# Patient Record
Sex: MALE | Race: BLACK OR AFRICAN AMERICAN | NOT HISPANIC OR LATINO | Employment: OTHER | URBAN - METROPOLITAN AREA
[De-identification: names, ages, dates, MRNs, and addresses within clinical notes are randomized per-mention and may not be internally consistent; named-entity substitution may affect disease eponyms.]

---

## 2018-07-19 ENCOUNTER — OFFICE VISIT (OUTPATIENT)
Dept: PAIN MEDICINE | Facility: CLINIC | Age: 48
End: 2018-07-19
Payer: OTHER GOVERNMENT

## 2018-07-19 VITALS
BODY MASS INDEX: 26.66 KG/M2 | DIASTOLIC BLOOD PRESSURE: 83 MMHG | SYSTOLIC BLOOD PRESSURE: 120 MMHG | HEART RATE: 66 BPM | WEIGHT: 180 LBS | HEIGHT: 69 IN

## 2018-07-19 DIAGNOSIS — M50.30 DDD (DEGENERATIVE DISC DISEASE), CERVICAL: ICD-10-CM

## 2018-07-19 DIAGNOSIS — M54.16 LUMBAR RADICULITIS: ICD-10-CM

## 2018-07-19 DIAGNOSIS — M96.1 POSTLAMINECTOMY SYNDROME OF LUMBAR REGION: ICD-10-CM

## 2018-07-19 DIAGNOSIS — M51.36 DDD (DEGENERATIVE DISC DISEASE), LUMBAR: Primary | ICD-10-CM

## 2018-07-19 PROCEDURE — 99203 OFFICE O/P NEW LOW 30 MIN: CPT | Mod: PBBFAC,PN | Performed by: ANESTHESIOLOGY

## 2018-07-19 PROCEDURE — 99999 PR PBB SHADOW E&M-NEW PATIENT-LVL III: CPT | Mod: PBBFAC,,, | Performed by: ANESTHESIOLOGY

## 2018-07-19 PROCEDURE — 99204 OFFICE O/P NEW MOD 45 MIN: CPT | Mod: S$PBB,,, | Performed by: ANESTHESIOLOGY

## 2018-07-19 RX ORDER — MELOXICAM 15 MG/1
TABLET ORAL
COMMUNITY
Start: 2018-06-15

## 2018-07-19 RX ORDER — METHOCARBAMOL 500 MG/1
TABLET, FILM COATED ORAL
COMMUNITY
Start: 2018-05-17

## 2018-07-19 RX ORDER — TRAMADOL HYDROCHLORIDE 50 MG/1
50 TABLET ORAL EVERY 12 HOURS PRN
Qty: 30 TABLET | Refills: 2 | Status: SHIPPED | OUTPATIENT
Start: 2018-07-19 | End: 2019-01-08 | Stop reason: SDUPTHER

## 2018-07-19 RX ORDER — ACYCLOVIR 800 MG/1
TABLET ORAL
COMMUNITY
Start: 2018-06-15

## 2018-07-19 RX ORDER — METHOCARBAMOL 750 MG/1
TABLET, FILM COATED ORAL
COMMUNITY
Start: 2018-06-15 | End: 2018-07-19 | Stop reason: SDUPTHER

## 2018-07-19 RX ORDER — TOPIRAMATE 50 MG/1
TABLET, FILM COATED ORAL
COMMUNITY
Start: 2018-06-15

## 2018-07-19 RX ORDER — TRAMADOL HYDROCHLORIDE 50 MG/1
TABLET ORAL
COMMUNITY
Start: 2018-04-13 | End: 2018-07-19

## 2018-07-19 RX ORDER — AMLODIPINE BESYLATE 10 MG/1
TABLET ORAL
COMMUNITY
Start: 2018-06-15

## 2018-07-19 NOTE — PROGRESS NOTES
This note was completed with dictation software and grammatical errors may exist.    Referring Physician:     PCP: Sara Quan NP      CC:  Neck and lower back pain    HPI:   Marshall Barboza is a 48 y.o. male referred to us for neck and lower back pain.  Patient is a former active  personnel.  He has significant history of cervical and lumbar spine surgery.  He underwent L4-5 laminotomy and diskectomy in February 2015.  Radicular pain improved but he continues to have lower back pain.  He also was found to have cervical DDD.  He underwent a C4-C7 ACDF.  Lower back and right leg pain is more bothersome currently.  He has constant aching, throbbing pain in his lower back.  Pain radiates to down his right buttock into his right calf.  Pain occasionally travels down the left calf.  He has had lumbar MB RFA procedures in the past at the [facility] with short-lived relief.  He has undergone lumbar ESIwith moderate benefit for 3 months.  He currently takes tramadol very sparingly with mild-to-moderate benefit.  He denies any worsening weakness.  No bowel bladder changes.    ROS:  CONSTITUTIONAL: No fevers, chills, night sweats, wt. loss, appetite changes  SKIN: no rashes or itching  ENT: No headaches, head trauma, vision changes, or eye pain  LYMPH NODES: None noticed   CV: No chest pain, palpitations.   RESP: No shortness of breath, dyspnea on exertion, cough, wheezing, or hemoptysis  GI: No nausea, emesis, diarrhea, constipation, melena, hematochezia, pain.    : No dysuria, hematuria, urgency, or frequency   HEME: No easy bruising, bleeding problems  PSYCHIATRIC: No depression, anxiety, psychosis, hallucinations.  NEURO: No seizures, memory loss, dizziness or difficulty sleeping  MSK:  Positive per HPI    Past Medical History:   Diagnosis Date    Allergy     Arthritis     Depression     Hypertension      Past Surgical History:   Procedure Laterality Date    BREAST SURGERY      FRACTURE SURGERY       "HERNIA REPAIR      SPINE SURGERY       No family history on file.  Social History     Social History    Marital status:      Spouse name: N/A    Number of children: N/A    Years of education: N/A     Social History Main Topics    Smoking status: None    Smokeless tobacco: Never Used    Alcohol use 0.6 oz/week     1 Cans of beer per week    Drug use: No    Sexual activity: Yes     Other Topics Concern    None     Social History Narrative    None         Medications/Allergies: See med card    Vitals:    07/19/18 0925   BP: 120/83   Pulse: 66   Weight: 81.6 kg (180 lb)   Height: 5' 9" (1.753 m)   PainSc:   6   PainLoc: Back         Physical exam:    GENERAL: A and O x3, the patient appears well groomed and is in no acute distress.  Skin: No rashes or obvious lesions  HEENT: normocephalic, atraumatic  CARDIOVASCULAR:  Palpable peripheral pulses  LUNGS: easy work of breathing  ABDOMEN: soft, nontender   UPPER EXTREMITIES: Normal alignment, normal range of motion, no atrophy, no skin changes,  hair growth and nail growth normal and equal bilaterally. No swelling, no tenderness.    LOWER EXTREMITIES:  Normal alignment, normal range of motion, no atrophy, no skin changes,  hair growth and nail growth normal and equal bilaterally. No swelling, no tenderness.  CERVICAL SPINE:  Cervical spine: ROM is limited in flexion, extension and lateral rotation with moderate increased pain.  Spurling's maneuver causes no neck pain to either side.  Myofascial exam: No Tenderness to palpation across cervical paraspinous region bilaterally.    LUMBAR SPINE  Lumbar spine: ROM is full with flexion extension and oblique extension with moderate increased pain.    Darrel's test causes no increased pain on either side.    Supine straight leg raise is positive on the right at 45°  Internal and external rotation of the hip causes no increased pain on either side.  Myofascial exam: No tenderness to palpation across lumbar " paraspinous muscles.      MENTAL STATUS: normal orientation, speech, language, and fund of knowledge for social situation.  Emotional state appropriate.    CRANIAL NERVES:  II:  PERRL bilaterally,   III,IV,VI: EOMI.    V:  Facial sensation equal bilaterally  VII:  Facial motor function normal.  VIII:  Hearing equal to finger rub bilaterally  IX/X: Gag normal, palate symmetric  XI:  Shoulder shrug equal, head turn equal  XII:  Tongue midline without fasciculations      MOTOR: Tone and bulk: normal bilateral upper and lower Strength: normal   Delt Bi Tri WE WF     R 5 5 5 5 5 5   L 5 5 5 5 5 5     IP ADD ABD Quad TA Gas HAM  R 5 5 5 5 5 5 5  L 5 5 5 5 5 5 5    SENSATION: Light touch and pinprick intact bilaterally  REFLEXES: normal, symmetric, nonbrisk.  Toes down, no clonus. No hoffmans.  GAIT:  Uses cane for assistance       Imaging:  MRI lumbar spine 01/2016 (outside records)  Lumbar spondylosis with small posterior disc protrusion L4-5 and L5-S1 with moderate bilateral neuroforaminal narrowing at L4-5.  Has postoperative changes of a laminotomy at L4-5 noted.    MRI cervical spine 05/2015  Postoperative changes of total disc arthroplasty at C4-5 anterior fusion with interbody spaces at C5-6 and C6-7.    Assessment:  Patient referred for neck and lower back pain  1. DDD (degenerative disc disease), lumbar    2. Postlaminectomy syndrome of lumbar region    3. DDD (degenerative disc disease), cervical    4. Lumbar radiculitis          Plan:  1. I have stressed the importance of physical activity and exercise to improve overall health  2. I think that the patient's back pain and radicular leg symptoms are due to degenerative disc disease and have recommended a lumbar epidural steroid injection to the Bilateral L4-5 level(s).  3.  May consider updated lumbar imaging if above is not helpful  4.  May consider cervical DEMETRIA if neck pain does not improve as well  5.  He continue tramadol 50 mg q.day as needed.  He takes  this very sparingly.  6.  Follow-up at the procedure      Thank you for referring this interesting patient, and I look forward to continuing to collaborate in his care.

## 2018-07-20 DIAGNOSIS — M51.36 DDD (DEGENERATIVE DISC DISEASE), LUMBAR: Primary | ICD-10-CM

## 2018-08-02 ENCOUNTER — SURGERY (OUTPATIENT)
Age: 48
End: 2018-08-02

## 2018-08-02 ENCOUNTER — HOSPITAL ENCOUNTER (OUTPATIENT)
Facility: AMBULARY SURGERY CENTER | Age: 48
Discharge: HOME | End: 2018-08-02
Attending: ANESTHESIOLOGY | Admitting: ANESTHESIOLOGY
Payer: OTHER GOVERNMENT

## 2018-08-02 DIAGNOSIS — M54.16 LUMBAR RADICULITIS: Primary | ICD-10-CM

## 2018-08-02 PROCEDURE — 64483 NJX AA&/STRD TFRM EPI L/S 1: CPT | Mod: 50,,, | Performed by: ANESTHESIOLOGY

## 2018-08-02 PROCEDURE — 64483 NJX AA&/STRD TFRM EPI L/S 1: CPT | Mod: LT | Performed by: ANESTHESIOLOGY

## 2018-08-02 RX ORDER — DEXAMETHASONE SODIUM PHOSPHATE 10 MG/ML
INJECTION INTRAMUSCULAR; INTRAVENOUS
Status: DISCONTINUED
Start: 2018-08-02 | End: 2018-08-02 | Stop reason: HOSPADM

## 2018-08-02 RX ORDER — BUPIVACAINE HYDROCHLORIDE 2.5 MG/ML
INJECTION, SOLUTION EPIDURAL; INFILTRATION; INTRACAUDAL
Status: DISCONTINUED | OUTPATIENT
Start: 2018-08-02 | End: 2018-08-02 | Stop reason: HOSPADM

## 2018-08-02 RX ORDER — DEXAMETHASONE SODIUM PHOSPHATE 10 MG/ML
INJECTION INTRAMUSCULAR; INTRAVENOUS
Status: DISCONTINUED | OUTPATIENT
Start: 2018-08-02 | End: 2018-08-02 | Stop reason: HOSPADM

## 2018-08-02 RX ORDER — HYDROCHLOROTHIAZIDE 25 MG/1
25 TABLET ORAL DAILY
COMMUNITY

## 2018-08-02 RX ORDER — SODIUM CHLORIDE, SODIUM LACTATE, POTASSIUM CHLORIDE, CALCIUM CHLORIDE 600; 310; 30; 20 MG/100ML; MG/100ML; MG/100ML; MG/100ML
INJECTION, SOLUTION INTRAVENOUS ONCE AS NEEDED
Status: DISCONTINUED | OUTPATIENT
Start: 2018-08-02 | End: 2018-08-02 | Stop reason: HOSPADM

## 2018-08-02 RX ORDER — LIDOCAINE HYDROCHLORIDE 10 MG/ML
INJECTION, SOLUTION EPIDURAL; INFILTRATION; INTRACAUDAL; PERINEURAL
Status: DISCONTINUED | OUTPATIENT
Start: 2018-08-02 | End: 2018-08-02 | Stop reason: HOSPADM

## 2018-08-02 RX ADMIN — BUPIVACAINE HYDROCHLORIDE 3 ML: 2.5 INJECTION, SOLUTION EPIDURAL; INFILTRATION; INTRACAUDAL at 12:08

## 2018-08-02 RX ADMIN — LIDOCAINE HYDROCHLORIDE 5 ML: 10 INJECTION, SOLUTION EPIDURAL; INFILTRATION; INTRACAUDAL; PERINEURAL at 12:08

## 2018-08-02 RX ADMIN — DEXAMETHASONE SODIUM PHOSPHATE 10 MG: 10 INJECTION INTRAMUSCULAR; INTRAVENOUS at 12:08

## 2018-08-02 NOTE — H&P (VIEW-ONLY)
This note was completed with dictation software and grammatical errors may exist.    Referring Physician:     PCP: Sara Quan NP      CC:  Neck and lower back pain    HPI:   Marshall Barboza is a 48 y.o. male referred to us for neck and lower back pain.  Patient is a former active  personnel.  He has significant history of cervical and lumbar spine surgery.  He underwent L4-5 laminotomy and diskectomy in February 2015.  Radicular pain improved but he continues to have lower back pain.  He also was found to have cervical DDD.  He underwent a C4-C7 ACDF.  Lower back and right leg pain is more bothersome currently.  He has constant aching, throbbing pain in his lower back.  Pain radiates to down his right buttock into his right calf.  Pain occasionally travels down the left calf.  He has had lumbar MB RFA procedures in the past at the [facility] with short-lived relief.  He has undergone lumbar ESIwith moderate benefit for 3 months.  He currently takes tramadol very sparingly with mild-to-moderate benefit.  He denies any worsening weakness.  No bowel bladder changes.    ROS:  CONSTITUTIONAL: No fevers, chills, night sweats, wt. loss, appetite changes  SKIN: no rashes or itching  ENT: No headaches, head trauma, vision changes, or eye pain  LYMPH NODES: None noticed   CV: No chest pain, palpitations.   RESP: No shortness of breath, dyspnea on exertion, cough, wheezing, or hemoptysis  GI: No nausea, emesis, diarrhea, constipation, melena, hematochezia, pain.    : No dysuria, hematuria, urgency, or frequency   HEME: No easy bruising, bleeding problems  PSYCHIATRIC: No depression, anxiety, psychosis, hallucinations.  NEURO: No seizures, memory loss, dizziness or difficulty sleeping  MSK:  Positive per HPI    Past Medical History:   Diagnosis Date    Allergy     Arthritis     Depression     Hypertension      Past Surgical History:   Procedure Laterality Date    BREAST SURGERY      FRACTURE SURGERY       "HERNIA REPAIR      SPINE SURGERY       No family history on file.  Social History     Social History    Marital status:      Spouse name: N/A    Number of children: N/A    Years of education: N/A     Social History Main Topics    Smoking status: None    Smokeless tobacco: Never Used    Alcohol use 0.6 oz/week     1 Cans of beer per week    Drug use: No    Sexual activity: Yes     Other Topics Concern    None     Social History Narrative    None         Medications/Allergies: See med card    Vitals:    07/19/18 0925   BP: 120/83   Pulse: 66   Weight: 81.6 kg (180 lb)   Height: 5' 9" (1.753 m)   PainSc:   6   PainLoc: Back         Physical exam:    GENERAL: A and O x3, the patient appears well groomed and is in no acute distress.  Skin: No rashes or obvious lesions  HEENT: normocephalic, atraumatic  CARDIOVASCULAR:  Palpable peripheral pulses  LUNGS: easy work of breathing  ABDOMEN: soft, nontender   UPPER EXTREMITIES: Normal alignment, normal range of motion, no atrophy, no skin changes,  hair growth and nail growth normal and equal bilaterally. No swelling, no tenderness.    LOWER EXTREMITIES:  Normal alignment, normal range of motion, no atrophy, no skin changes,  hair growth and nail growth normal and equal bilaterally. No swelling, no tenderness.  CERVICAL SPINE:  Cervical spine: ROM is limited in flexion, extension and lateral rotation with moderate increased pain.  Spurling's maneuver causes no neck pain to either side.  Myofascial exam: No Tenderness to palpation across cervical paraspinous region bilaterally.    LUMBAR SPINE  Lumbar spine: ROM is full with flexion extension and oblique extension with moderate increased pain.    Darrel's test causes no increased pain on either side.    Supine straight leg raise is positive on the right at 45°  Internal and external rotation of the hip causes no increased pain on either side.  Myofascial exam: No tenderness to palpation across lumbar " paraspinous muscles.      MENTAL STATUS: normal orientation, speech, language, and fund of knowledge for social situation.  Emotional state appropriate.    CRANIAL NERVES:  II:  PERRL bilaterally,   III,IV,VI: EOMI.    V:  Facial sensation equal bilaterally  VII:  Facial motor function normal.  VIII:  Hearing equal to finger rub bilaterally  IX/X: Gag normal, palate symmetric  XI:  Shoulder shrug equal, head turn equal  XII:  Tongue midline without fasciculations      MOTOR: Tone and bulk: normal bilateral upper and lower Strength: normal   Delt Bi Tri WE WF     R 5 5 5 5 5 5   L 5 5 5 5 5 5     IP ADD ABD Quad TA Gas HAM  R 5 5 5 5 5 5 5  L 5 5 5 5 5 5 5    SENSATION: Light touch and pinprick intact bilaterally  REFLEXES: normal, symmetric, nonbrisk.  Toes down, no clonus. No hoffmans.  GAIT:  Uses cane for assistance       Imaging:  MRI lumbar spine 01/2016 (outside records)  Lumbar spondylosis with small posterior disc protrusion L4-5 and L5-S1 with moderate bilateral neuroforaminal narrowing at L4-5.  Has postoperative changes of a laminotomy at L4-5 noted.    MRI cervical spine 05/2015  Postoperative changes of total disc arthroplasty at C4-5 anterior fusion with interbody spaces at C5-6 and C6-7.    Assessment:  Patient referred for neck and lower back pain  1. DDD (degenerative disc disease), lumbar    2. Postlaminectomy syndrome of lumbar region    3. DDD (degenerative disc disease), cervical    4. Lumbar radiculitis          Plan:  1. I have stressed the importance of physical activity and exercise to improve overall health  2. I think that the patient's back pain and radicular leg symptoms are due to degenerative disc disease and have recommended a lumbar epidural steroid injection to the Bilateral L4-5 level(s).  3.  May consider updated lumbar imaging if above is not helpful  4.  May consider cervical DEMETRIA if neck pain does not improve as well  5.  He continue tramadol 50 mg q.day as needed.  He takes  this very sparingly.  6.  Follow-up at the procedure      Thank you for referring this interesting patient, and I look forward to continuing to collaborate in his care.

## 2018-08-02 NOTE — DISCHARGE INSTRUCTIONS
Recovery After Procedural Sedation (Adult)  You have been given medicine by vein to make you sleep during your surgery. This may have included both a pain medicine and sleeping medicine. Most of the effects have worn off. But you may still have some drowsiness for the next 6 to 8 hours.  Home care  Follow these guidelines when you get home:  · For the next 8 hours, you should be watched by a responsible adult. This person should make sure your condition is not getting worse.  · Don't drink any alcohol for the next 24 hours.  · Don't drive, operate dangerous machinery, or make important business or personal decisions during the next 24 hours.  Note: Your healthcare provider may tell you not to take any medicine by mouth for pain or sleep in the next 4 hours. These medicines may react with the medicines you were given in the hospital. This could cause a much stronger response than usual.  Follow-up care  Follow up with your healthcare provider if you are not alert and back to your usual level of activity within 12 hours.  When to seek medical advice  Call your healthcare provider right away if any of these occur:  · Drowsiness gets worse  · Weakness or dizziness gets worse  · Repeated vomiting  · You can't be awakened   Date Last Reviewed: 10/18/2016  © 9902-3344 [employer]. [address] [place] [address], [place] [address]. All rights reserved. This information is not intended as a substitute for professional medical care. Always follow your healthcare professional's instructions.      Pain injection instructions:     Steroids take about a 2 weeks to relieve pain.  Initially you may get pain relief from the local anesthetic but this may wear off before the steroid works.    No driving for 24 hrs.   Activity as tolerated- gradually increase activities.  Dont lift over 10 lbs for 24 hrs   No heat at injection sites x 2 days. No heating pads, hot tubs, saunas, or swimming in any body of water or pool for 2  days.  Use ice pack for mild swelling and for comfort , apply for 20 minutes, remove for 20 minute intervals. No direct contact of ice itself  to skin.  May shower today. No tub baths for two days.      Resume Aspirin, Plavix, or Coumadin the day after the procedure unless otherwise instructed.   If diabetic,monitor your glucose carefully as steroids can increase your glucose level    Seek immediate medical help for:   Severe increase in your usual pain or appearance of new pain.  Prolonged (mor than 8 hours) or increasing weakness or numbness in the legs or arms.    - Numbing medicine was injected that affects nerves that carry information from       muscles to the  brain and the brain to the muscles.  This numbness can last 6-8 hrs so be very careful and get assistance when standing or walking.    Fever above 101 ,Drainage,redness,active bleeding, or increased swelling at the injection site.  Headache, shortness of breath, chest pain, or breathing problems.

## 2018-08-02 NOTE — OP NOTE
PROCEDURE DATE: 8/2/2018    PROCEDURE: Bilateral L4-5 transforaminal epidural steroid injection under fluoroscopy    DIAGNOSIS: Lumbar disc displacement without myelopathy  Post op diagnosis: Same    PHYSICIAN: Fidencio Vazquez MD    MEDICATIONS INJECTED:  Dexamethasone 5mg (0.5ml) and 1.5ml 0.25% bupivicaine at each nerve root.     LOCAL ANESTHETIC INJECTED:  Lidocaine 1%. 2 ml per site.    SEDATION MEDICATIONS: None    ESTIMATED BLOOD LOSS:  None    COMPLICATIONS:  None    TECHNIQUE:   A time-out was taken to identify patient and procedure side prior to starting the procedure. The patient was placed in a prone position, prepped and draped in the usual sterile fashion using ChloraPrep and sterile towels.  The area to be injected was determined under fluoroscopic guidance in AP and oblique view.  Local anesthetic was given by raising a wheal and going down to the hub of a 25-gauge 1.5 inch needle.  In oblique view, a 3.5 inch 22-gauge bent-tip spinal needle was introduced towards 6 oclock position of the pedicle of each above named nerve root level.  The needle was walked medially then hinged into the neural foramen and position was confirmed in AP and lateral views.  1ml contrast dye was injected to confirm appropriate placement and that there was no vascular uptake.  After negative aspiration for blood or CSF, the medication was then injected. This was performed at the bilateral L4-5 level(s). The patient tolerated the procedure well.    The patient was monitored after the procedure.  Patient was given post procedure and discharge instructions to follow at home. The patient was discharged in a stable condition.

## 2018-08-02 NOTE — DISCHARGE SUMMARY
[facility]  Discharge Note  Short Stay    Admit Date: 8/2/2018    Discharge Date and Time: 8/2/2018    Attending Physician: Fidencio Vazquez MD     Discharge Provider: Fidencio Vazquez    Diagnoses:  Active Hospital Problems    Diagnosis  POA    *Lumbar radiculitis [M54.16]  Yes      Resolved Hospital Problems    Diagnosis Date Resolved POA   No resolved problems to display.       Hospital Course: Lumbar DEMETRIA  Discharged Condition: Good    Final Diagnoses:   Active Hospital Problems    Diagnosis  POA    *Lumbar radiculitis [M54.16]  Yes      Resolved Hospital Problems    Diagnosis Date Resolved POA   No resolved problems to display.       Disposition: Home or Self Care    Follow up/Patient Instructions:    Medications:  Reconciled Home Medications:      Medication List      CONTINUE taking these medications    acyclovir 800 MG Tab  Commonly known as:  ZOVIRAX     amLODIPine 10 MG tablet  Commonly known as:  NORVASC     hydroCHLOROthiazide 25 MG tablet  Commonly known as:  HYDRODIURIL  Take 25 mg by mouth once daily.     meloxicam 15 MG tablet  Commonly known as:  MOBIC     methocarbamol 500 MG Tab  Commonly known as:  ROBAXIN     topiramate 50 MG tablet  Commonly known as:  TOPAMAX     traMADol 50 mg tablet  Commonly known as:  ULTRAM  Take 1 tablet (50 mg total) by mouth every 12 (twelve) hours as needed for Pain.            Discharge Procedure Orders  Call MD for:  temperature >100.4     Call MD for:  persistent nausea and vomiting or diarrhea     Call MD for:  severe uncontrolled pain     Call MD for:  redness, tenderness, or signs of infection (pain, swelling, redness, odor or green/yellow discharge around incision site)     Call MD for:  difficulty breathing or increased cough     Call MD for:  severe persistent headache          Follow up with MD in 2-3 weeks    Discharge Procedure Orders (must include Diet, Follow-up, Activity):    Discharge Procedure Orders (must include Diet, Follow-up, Activity)  Call MD  for:  temperature >100.4     Call MD for:  persistent nausea and vomiting or diarrhea     Call MD for:  severe uncontrolled pain     Call MD for:  redness, tenderness, or signs of infection (pain, swelling, redness, odor or green/yellow discharge around incision site)     Call MD for:  difficulty breathing or increased cough     Call MD for:  severe persistent headache

## 2018-08-02 NOTE — PLAN OF CARE
Pt states ready to go home , stable, tolerating fluids. Denies pain.Able to lift legs off bed wo/ difficulty. Injection site HEMANTH no drainage noted. Ambulated to car w/ cane accompanied by nurse. Home w/ spouse.

## 2018-08-08 VITALS
HEIGHT: 69 IN | OXYGEN SATURATION: 97 % | RESPIRATION RATE: 18 BRPM | HEART RATE: 65 BPM | BODY MASS INDEX: 26.66 KG/M2 | TEMPERATURE: 98 F | WEIGHT: 180 LBS | DIASTOLIC BLOOD PRESSURE: 102 MMHG | SYSTOLIC BLOOD PRESSURE: 140 MMHG

## 2018-08-30 ENCOUNTER — OFFICE VISIT (OUTPATIENT)
Dept: PAIN MEDICINE | Facility: CLINIC | Age: 48
End: 2018-08-30
Payer: OTHER GOVERNMENT

## 2018-08-30 VITALS
DIASTOLIC BLOOD PRESSURE: 93 MMHG | HEIGHT: 69 IN | HEART RATE: 68 BPM | WEIGHT: 180 LBS | BODY MASS INDEX: 26.66 KG/M2 | SYSTOLIC BLOOD PRESSURE: 141 MMHG

## 2018-08-30 DIAGNOSIS — M96.1 POSTLAMINECTOMY SYNDROME OF LUMBAR REGION: ICD-10-CM

## 2018-08-30 DIAGNOSIS — M51.36 DDD (DEGENERATIVE DISC DISEASE), LUMBAR: Primary | ICD-10-CM

## 2018-08-30 DIAGNOSIS — M50.30 DDD (DEGENERATIVE DISC DISEASE), CERVICAL: ICD-10-CM

## 2018-08-30 PROCEDURE — 99999 PR PBB SHADOW E&M-EST. PATIENT-LVL III: CPT | Mod: PBBFAC,,, | Performed by: PHYSICIAN ASSISTANT

## 2018-08-30 PROCEDURE — 99214 OFFICE O/P EST MOD 30 MIN: CPT | Mod: S$PBB,,, | Performed by: PHYSICIAN ASSISTANT

## 2018-08-30 PROCEDURE — 99213 OFFICE O/P EST LOW 20 MIN: CPT | Mod: PBBFAC,PN | Performed by: PHYSICIAN ASSISTANT

## 2018-08-30 NOTE — PROGRESS NOTES
Referring Physician: No ref. provider found    PCP: Sara Quan NP      CC:  Neck and lower back pain    Interval History:  Marshall Barboza is a 48 y.o. male with chronic neck and low back pain who presents today for f/u s/p lumbar TFESI bilaterally at L4-5. Reports 60% relief for 1 week. Pain returned to baseline. Pain is unchanged in quality or location. He is interested in further pain relief. Denies worsening LE weakness. Denies b/b changes. He take Tramadol sparingly. He has 1 refill remaining. Pain today is rated 6/10.  Pt has been seen in the clinic before, however pt is new to me.     History below per Dr. Vazquez    HPI:   Marshall Barboza is a 48 y.o. male referred to us for neck and lower back pain.  Patient is a former active  personnel.  He has significant history of cervical and lumbar spine surgery.  He underwent L4-5 laminotomy and diskectomy in February 2015.  Radicular pain improved but he continues to have lower back pain.  He also was found to have cervical DDD.  He underwent a C4-C7 ACDF.  Lower back and right leg pain is more bothersome currently.  He has constant aching, throbbing pain in his lower back.  Pain radiates to down his right buttock into his right calf.  Pain occasionally travels down the left calf.  He has had lumbar MB RFA procedures in the past at the  with short-lived relief.  He has undergone lumbar ESIwith moderate benefit for 3 months.  He currently takes tramadol very sparingly with mild-to-moderate benefit.  He denies any worsening weakness.  No bowel bladder changes.    ROS:  CONSTITUTIONAL: No fevers, chills, night sweats, wt. loss, appetite changes  SKIN: no rashes or itching  ENT: No headaches, head trauma, vision changes, or eye pain  LYMPH NODES: None noticed   CV: No chest pain, palpitations.   RESP: No shortness of breath, dyspnea on exertion, cough, wheezing, or hemoptysis  GI: No nausea, emesis, diarrhea, constipation, melena, hematochezia, pain.    :  "No dysuria, hematuria, urgency, or frequency   HEME: No easy bruising, bleeding problems  PSYCHIATRIC: No depression, anxiety, psychosis, hallucinations.  NEURO: No seizures, memory loss, dizziness or difficulty sleeping  MSK:  Positive per HPI    Past Medical History:   Diagnosis Date    Allergy     Arthritis     Depression     Hypertension     Lumbar radiculitis 8/2/2018     Past Surgical History:   Procedure Laterality Date    BREAST SURGERY      FRACTURE SURGERY      HERNIA REPAIR      SPINE SURGERY       History reviewed. No pertinent family history.  Social History     Socioeconomic History    Marital status:      Spouse name: None    Number of children: None    Years of education: None    Highest education level: None   Social Needs    Financial resource strain: None    Food insecurity - worry: None    Food insecurity - inability: None    Transportation needs - medical: None    Transportation needs - non-medical: None   Occupational History    None   Tobacco Use    Smoking status: None    Smokeless tobacco: Never Used   Substance and Sexual Activity    Alcohol use: Yes     Alcohol/week: 0.6 oz     Types: 1 Cans of beer per week    Drug use: No    Sexual activity: Yes   Other Topics Concern    None   Social History Narrative    None         Medications/Allergies: See med card    Vitals:    08/30/18 0904   BP: (!) 141/93   Pulse: 68   Weight: 81.6 kg (180 lb)   Height: 5' 9" (1.753 m)   PainSc:   6   PainLoc: Back         Physical exam:    GENERAL: A and O x3, the patient appears well groomed and is in no acute distress.  Skin: No rashes or obvious lesions  HEENT: normocephalic, atraumatic  CARDIOVASCULAR:  RRR  LUNGS: non labored breathing  ABDOMEN: soft, nontender   UPPER EXTREMITIES: Normal alignment, normal range of motion, no atrophy, no skin changes,  hair growth and nail growth normal and equal bilaterally. No swelling, no tenderness.    LOWER EXTREMITIES:  Normal " alignment, normal range of motion, no atrophy, no skin changes,  hair growth and nail growth normal and equal bilaterally. No swelling, no tenderness.  CERVICAL SPINE:  Cervical spine: ROM is limited in flexion, extension and lateral rotation with moderate increased pain.  Spurling's maneuver causes no neck pain to either side.  Myofascial exam: No Tenderness to palpation across cervical paraspinous region bilaterally.    LUMBAR SPINE  Lumbar spine: ROM is full with flexion extension and oblique extension with moderate increased pain.    Darrel's test causes no increased pain on either side.    Supine straight leg raise is positive on the right at 45°  Internal and external rotation of the hip causes no increased pain on either side.  Myofascial exam: No tenderness to palpation across lumbar paraspinous muscles.      MENTAL STATUS: normal orientation, speech, language, and fund of knowledge for social situation.  Emotional state appropriate.    CRANIAL NERVES:  II:  PERRL bilaterally,   III,IV,VI: EOMI.    V:  Facial sensation equal bilaterally  VII:  Facial motor function normal.  VIII:  Hearing equal to finger rub bilaterally  IX/X: Gag normal, palate symmetric  XI:  Shoulder shrug equal, head turn equal  XII:  Tongue midline without fasciculations      MOTOR: Tone and bulk: normal bilateral upper and lower Strength: normal   Delt Bi Tri WE WF     R 5 5 5 5 5 5   L 5 5 5 5 5 5     IP ADD ABD Quad TA Gas HAM  R 5 5 5 5 5 5 5  L 5 5 5 5 5 5 5    SENSATION: Light touch and pinprick intact bilaterally  REFLEXES: normal, symmetric, nonbrisk.  Toes down, no clonus. No hoffmans.  GAIT:  Uses cane for assistance       Imaging:  MRI lumbar spine 01/2016 (outside records)  Lumbar spondylosis with small posterior disc protrusion L4-5 and L5-S1 with moderate bilateral neuroforaminal narrowing at L4-5.  Has postoperative changes of a laminotomy at L4-5 noted.    MRI cervical spine 05/2015  Postoperative changes of total  disc arthroplasty at C4-5 anterior fusion with interbody spaces at C5-6 and C6-7.    Assessment:  Marshall Barboza is a 48 y.o. male with neck and lower back pain  1. DDD (degenerative disc disease), lumbar    2. Postlaminectomy syndrome of lumbar region    3. DDD (degenerative disc disease), cervical        Plan:  1. I have stressed the importance of physical activity and exercise to improve overall health  2. Schedule repeat lumbar epidural steroid injection to the Bilateral L4-5 level(s). I have explained the risks, benefits, and alternatives of the procedure in detail. The patient voices understanding and all questions have been answered. The patient agrees to proceed as planned. Written Consent obtained.   3.  May consider updated lumbar imaging if above is not helpful  4.  May consider cervical DEMETRIA if neck pain does not improve as well  5.  He continue tramadol 50 mg q.day as needed.  He takes this very sparingly.  6.  Follow-up at the procedure      Thank you for referring this interesting patient, and I look forward to continuing to collaborate in his care.

## 2018-08-31 DIAGNOSIS — M54.16 LUMBAR RADICULOPATHY: Primary | ICD-10-CM

## 2018-09-14 ENCOUNTER — HOSPITAL ENCOUNTER (OUTPATIENT)
Facility: AMBULARY SURGERY CENTER | Age: 48
Discharge: HOME | End: 2018-09-14
Attending: ANESTHESIOLOGY | Admitting: ANESTHESIOLOGY
Payer: OTHER GOVERNMENT

## 2018-09-14 DIAGNOSIS — M54.16 LUMBAR RADICULITIS: Primary | ICD-10-CM

## 2018-09-14 PROCEDURE — 99152 MOD SED SAME PHYS/QHP 5/>YRS: CPT | Mod: ,,, | Performed by: ANESTHESIOLOGY

## 2018-09-14 PROCEDURE — 64483 NJX AA&/STRD TFRM EPI L/S 1: CPT | Mod: 50,,, | Performed by: ANESTHESIOLOGY

## 2018-09-14 PROCEDURE — 64483 NJX AA&/STRD TFRM EPI L/S 1: CPT | Mod: LT | Performed by: ANESTHESIOLOGY

## 2018-09-14 RX ORDER — LIDOCAINE HYDROCHLORIDE 10 MG/ML
INJECTION, SOLUTION EPIDURAL; INFILTRATION; INTRACAUDAL; PERINEURAL
Status: DISCONTINUED | OUTPATIENT
Start: 2018-09-14 | End: 2018-09-14 | Stop reason: HOSPADM

## 2018-09-14 RX ORDER — DEXAMETHASONE SODIUM PHOSPHATE 10 MG/ML
INJECTION INTRAMUSCULAR; INTRAVENOUS
Status: DISCONTINUED | OUTPATIENT
Start: 2018-09-14 | End: 2018-09-14 | Stop reason: HOSPADM

## 2018-09-14 RX ORDER — DEXAMETHASONE SODIUM PHOSPHATE 10 MG/ML
INJECTION INTRAMUSCULAR; INTRAVENOUS
Status: DISCONTINUED
Start: 2018-09-14 | End: 2018-09-14 | Stop reason: HOSPADM

## 2018-09-14 RX ORDER — SODIUM CHLORIDE, SODIUM LACTATE, POTASSIUM CHLORIDE, CALCIUM CHLORIDE 600; 310; 30; 20 MG/100ML; MG/100ML; MG/100ML; MG/100ML
INJECTION, SOLUTION INTRAVENOUS ONCE AS NEEDED
Status: DISCONTINUED | OUTPATIENT
Start: 2018-09-14 | End: 2018-09-14 | Stop reason: HOSPADM

## 2018-09-14 RX ORDER — LIDOCAINE HYDROCHLORIDE 10 MG/ML
INJECTION, SOLUTION EPIDURAL; INFILTRATION; INTRACAUDAL; PERINEURAL
Status: DISCONTINUED
Start: 2018-09-14 | End: 2018-09-14 | Stop reason: HOSPADM

## 2018-09-14 NOTE — OP NOTE
PROCEDURE DATE: 9/14/2018    PROCEDURE: Bilateral L4-5 transforaminal epidural steroid injection under fluoroscopy    DIAGNOSIS: Lumbar disc displacement without myelopathy  Post op diagnosis: Same    PHYSICIAN: Fidencio Vazquez MD    MEDICATIONS INJECTED:  Dexamethasone 5mg (0.5ml) and 1.5ml 0.25% bupivicaine at each nerve root.     LOCAL ANESTHETIC INJECTED:  Lidocaine 1%. 2 ml per site.    SEDATION MEDICATIONS: RN IV sedation    ESTIMATED BLOOD LOSS:  None    COMPLICATIONS:  None    TECHNIQUE:   A time-out was taken to identify patient and procedure side prior to starting the procedure. The patient was placed in a prone position, prepped and draped in the usual sterile fashion using ChloraPrep and sterile towels.  The area to be injected was determined under fluoroscopic guidance in AP and oblique view.  Local anesthetic was given by raising a wheal and going down to the hub of a 25-gauge 1.5 inch needle.  In oblique view, a 3.5 inch 22-gauge bent-tip spinal needle was introduced towards 6 oclock position of the pedicle of each above named nerve root level.  The needle was walked medially then hinged into the neural foramen and position was confirmed in AP and lateral views.  1ml contrast dye was injected to confirm appropriate placement and that there was no vascular uptake.  After negative aspiration for blood or CSF, the medication was then injected. This was performed at the bilateral L4-5 level(s). The patient tolerated the procedure well.    The patient was monitored after the procedure.  Patient was given post procedure and discharge instructions to follow at home. The patient was discharged in a stable condition.

## 2018-09-14 NOTE — DISCHARGE SUMMARY
[facility]  Discharge Note  Short Stay    Admit Date: 9/14/2018    Discharge Date and Time: 9/14/2018    Attending Physician: Fidencio Vazquez MD     Discharge Provider: Fidencio Vazquez    Diagnoses:  Active Hospital Problems    Diagnosis  POA    *Lumbar radiculitis [M54.16]  Yes      Resolved Hospital Problems   No resolved problems to display.       Hospital Course: Lumbar DEMETRIA  Discharged Condition: Good    Final Diagnoses:   Active Hospital Problems    Diagnosis  POA    *Lumbar radiculitis [M54.16]  Yes      Resolved Hospital Problems   No resolved problems to display.       Disposition: Home or Self Care    Follow up/Patient Instructions:    Medications:  Reconciled Home Medications:      Medication List      CONTINUE taking these medications    acyclovir 800 MG Tab  Commonly known as:  ZOVIRAX     amLODIPine 10 MG tablet  Commonly known as:  NORVASC     hydroCHLOROthiazide 25 MG tablet  Commonly known as:  HYDRODIURIL  Take 25 mg by mouth once daily.     meloxicam 15 MG tablet  Commonly known as:  MOBIC     methocarbamol 500 MG Tab  Commonly known as:  ROBAXIN     topiramate 50 MG tablet  Commonly known as:  TOPAMAX     traMADol 50 mg tablet  Commonly known as:  ULTRAM  Take 1 tablet (50 mg total) by mouth every 12 (twelve) hours as needed for Pain.          Discharge Procedure Orders   Call MD for:  temperature >100.4     Call MD for:  persistent nausea and vomiting or diarrhea     Call MD for:  severe uncontrolled pain     Call MD for:  redness, tenderness, or signs of infection (pain, swelling, redness, odor or green/yellow discharge around incision site)     Call MD for:  difficulty breathing or increased cough     Call MD for:  severe persistent headache        Follow up with MD in 2-3 weeks    Discharge Procedure Orders (must include Diet, Follow-up, Activity):   Discharge Procedure Orders (must include Diet, Follow-up, Activity)   Call MD for:  temperature >100.4     Call MD for:  persistent nausea and  vomiting or diarrhea     Call MD for:  severe uncontrolled pain     Call MD for:  redness, tenderness, or signs of infection (pain, swelling, redness, odor or green/yellow discharge around incision site)     Call MD for:  difficulty breathing or increased cough     Call MD for:  severe persistent headache

## 2018-09-14 NOTE — PLAN OF CARE
Patient sitting in chair and states ready to go home; denies pain nausea or any weakness. Spouse present at chairside and statess he is ready to take patient home and that she is driving the patient home.

## 2018-09-18 VITALS
DIASTOLIC BLOOD PRESSURE: 95 MMHG | OXYGEN SATURATION: 95 % | BODY MASS INDEX: 26.66 KG/M2 | HEART RATE: 59 BPM | SYSTOLIC BLOOD PRESSURE: 147 MMHG | RESPIRATION RATE: 18 BRPM | TEMPERATURE: 98 F | HEIGHT: 69 IN | WEIGHT: 180 LBS

## 2018-10-10 ENCOUNTER — OFFICE VISIT (OUTPATIENT)
Dept: PAIN MEDICINE | Facility: CLINIC | Age: 48
End: 2018-10-10
Payer: OTHER GOVERNMENT

## 2018-10-10 VITALS
DIASTOLIC BLOOD PRESSURE: 93 MMHG | WEIGHT: 180 LBS | BODY MASS INDEX: 26.66 KG/M2 | SYSTOLIC BLOOD PRESSURE: 144 MMHG | HEIGHT: 69 IN | HEART RATE: 64 BPM

## 2018-10-10 DIAGNOSIS — M51.36 DDD (DEGENERATIVE DISC DISEASE), LUMBAR: ICD-10-CM

## 2018-10-10 DIAGNOSIS — M96.1 POSTLAMINECTOMY SYNDROME OF LUMBAR REGION: ICD-10-CM

## 2018-10-10 DIAGNOSIS — M54.16 LUMBAR RADICULOPATHY: Primary | ICD-10-CM

## 2018-10-10 PROCEDURE — 99214 OFFICE O/P EST MOD 30 MIN: CPT | Mod: PBBFAC,PN | Performed by: PHYSICIAN ASSISTANT

## 2018-10-10 PROCEDURE — 99999 PR PBB SHADOW E&M-EST. PATIENT-LVL IV: CPT | Mod: PBBFAC,,, | Performed by: PHYSICIAN ASSISTANT

## 2018-10-10 PROCEDURE — 99214 OFFICE O/P EST MOD 30 MIN: CPT | Mod: S$PBB,,, | Performed by: PHYSICIAN ASSISTANT

## 2018-10-10 RX ORDER — TRAMADOL HYDROCHLORIDE 50 MG/1
50 TABLET ORAL
Qty: 30 TABLET | Refills: 2 | Status: SHIPPED | OUTPATIENT
Start: 2018-10-10 | End: 2018-11-09

## 2018-10-10 NOTE — PROGRESS NOTES
Referring Physician: No ref. provider found    PCP: Sara Quan NP      CC:  Neck and lower back pain    Interval History:  Marshall Barboza is a 48 y.o. male with chronic neck and low back pain who presents today for f/u s/p repeat lumbar TFESI bilaterally at L4-5. Reports Good relief. Pain is unchanged in quality or location. Denies worsening LE weakness. Denies b/b changes. He take Tramadol sparingly.  Pain today is rated 4/10.    HPI:   Marshall Barboza is a 48 y.o. male referred to us for neck and lower back pain.  Patient is a former active  personnel.  He has significant history of cervical and lumbar spine surgery.  He underwent L4-5 laminotomy and diskectomy in February 2015.  Radicular pain improved but he continues to have lower back pain.  He also was found to have cervical DDD.  He underwent a C4-C7 ACDF.  Lower back and right leg pain is more bothersome currently.  He has constant aching, throbbing pain in his lower back.  Pain radiates to down his right buttock into his right calf.  Pain occasionally travels down the left calf.  He has had lumbar MB RFA procedures in the past at the [facility] with short-lived relief.  He has undergone lumbar ESIwith moderate benefit for 3 months.  He currently takes tramadol very sparingly with mild-to-moderate benefit.  He denies any worsening weakness.  No bowel bladder changes.    ROS:  CONSTITUTIONAL: No fevers, chills, night sweats, wt. loss, appetite changes  SKIN: no rashes or itching  ENT: No headaches, head trauma, vision changes, or eye pain  LYMPH NODES: None noticed   CV: No chest pain, palpitations.   RESP: No shortness of breath, dyspnea on exertion, cough, wheezing, or hemoptysis  GI: No nausea, emesis, diarrhea, constipation, melena, hematochezia, pain.    : No dysuria, hematuria, urgency, or frequency   HEME: No easy bruising, bleeding problems  PSYCHIATRIC: No depression, anxiety, psychosis, hallucinations.  NEURO: No seizures, memory loss,  "dizziness or difficulty sleeping  MSK:  Positive per HPI    Past Medical History:   Diagnosis Date    Allergy     Arthritis     Depression     Hypertension     Lumbar radiculitis 8/2/2018     Past Surgical History:   Procedure Laterality Date    BREAST SURGERY      FRACTURE SURGERY      HERNIA REPAIR      Injection,steroid,epidural,transforaminal approach Bilateral 9/14/2018    Performed by Fidencio Vazquez MD at [facility]    Injection,steroid,epidural,transforaminal approach Bilateral 8/2/2018    Performed by [facility]io V[facility]quez MD at [facility]    SPINE SURGERY       History reviewed. No pertinent family history.  Social History     Socioeconomic History    Marital status:      Spouse name: None    Number of children: None    Years of education: None    Highest education level: None   Social Needs    Financial resource strain: None    Food insecurity - worry: None    Food insecurity - inability: None    Transportation needs - medical: None    Transportation needs - non-medical: None   Occupational History    None   Tobacco Use    Smoking status: None    Smokeless tobacco: Never Used   Substance and Sexual Activity    Alcohol use: Yes     Alcohol/week: 0.6 oz     Types: 1 Cans of beer per week    Drug use: No    Sexual activity: Yes   Other Topics Concern    None   Social History Narrative    None         Medications/Allergies: See med card    Vitals:    10/10/18 0847   BP: (!) 144/93   Pulse: 64   Weight: 81.6 kg (180 lb)   Height: 5' 9" (1.753 m)   PainSc:   4   PainLoc: Back         Physical exam:    GENERAL: A and O x3, the patient appears well groomed and is in no acute distress.  Skin: No rashes or obvious lesions  HEENT: normocephalic, atraumatic  CARDIOVASCULAR:  RRR  LUNGS: non labored breathing  ABDOMEN: soft, nontender   UPPER EXTREMITIES: Normal alignment, normal range of motion, no atrophy, no skin changes,  hair growth and nail growth normal and equal bilaterally. No swelling, no " tenderness.    LOWER EXTREMITIES:  Normal alignment, normal range of motion, no atrophy, no skin changes,  hair growth and nail growth normal and equal bilaterally. No swelling, no tenderness.  CERVICAL SPINE:  Cervical spine: ROM is limited in flexion, extension and lateral rotation with moderate increased pain.  Spurling's maneuver causes no neck pain to either side.  Myofascial exam: No Tenderness to palpation across cervical paraspinous region bilaterally.    LUMBAR SPINE  Lumbar spine: ROM is full with flexion extension and oblique extension with moderate increased pain.    Darrel's test causes no increased pain on either side.    Supine straight leg raise is positive on the right at 45°  Internal and external rotation of the hip causes no increased pain on either side.  Myofascial exam: No tenderness to palpation across lumbar paraspinous muscles.      MENTAL STATUS: normal orientation, speech, language, and fund of knowledge for social situation.  Emotional state appropriate.    CRANIAL NERVES:  II:  PERRL bilaterally,   III,IV,VI: EOMI.    V:  Facial sensation equal bilaterally  VII:  Facial motor function normal.  VIII:  Hearing equal to finger rub bilaterally  IX/X: Gag normal, palate symmetric  XI:  Shoulder shrug equal, head turn equal  XII:  Tongue midline without fasciculations      MOTOR: Tone and bulk: normal bilateral upper and lower Strength: normal   Delt Bi Tri WE WF     R 5 5 5 5 5 5   L 5 5 5 5 5 5     IP ADD ABD Quad TA Gas HAM  R 5 5 5 5 5 5 5  L 5 5 5 5 5 5 5    SENSATION: Light touch and pinprick intact bilaterally  REFLEXES: normal, symmetric, nonbrisk.  Toes down, no clonus. No hoffmans.  GAIT:  Uses cane for assistance       Imaging:  MRI lumbar spine 01/2016 (outside records)  Lumbar spondylosis with small posterior disc protrusion L4-5 and L5-S1 with moderate bilateral neuroforaminal narrowing at L4-5.  Has postoperative changes of a laminotomy at L4-5 noted.    MRI cervical spine  05/2015  Postoperative changes of total disc arthroplasty at C4-5 anterior fusion with interbody spaces at C5-6 and C6-7.    Assessment:  Marshall Barboza is a 48 y.o. male with neck and lower back pain  1. Lumbar radiculopathy    2. DDD (degenerative disc disease), lumbar    3. Postlaminectomy syndrome of lumbar region      Plan:  1. I have stressed the importance of physical activity and exercise to improve overall health  2. Monitor progress and consider repeat lumbar epidural steroid injection to the Bilateral L4-5 level(s).    3. May consider cervical DEMETRIA if neck pain does not improve as well  4. He continue tramadol 50 mg q.day as needed.  He takes this very sparingly.  5. Follow-up 3 months or sooner

## 2019-01-08 ENCOUNTER — OFFICE VISIT (OUTPATIENT)
Dept: PAIN MEDICINE | Facility: CLINIC | Age: 49
End: 2019-01-08
Payer: OTHER GOVERNMENT

## 2019-01-08 VITALS
WEIGHT: 180 LBS | DIASTOLIC BLOOD PRESSURE: 92 MMHG | HEIGHT: 69 IN | HEART RATE: 76 BPM | BODY MASS INDEX: 26.66 KG/M2 | SYSTOLIC BLOOD PRESSURE: 148 MMHG

## 2019-01-08 DIAGNOSIS — M54.16 LUMBAR RADICULOPATHY: Primary | ICD-10-CM

## 2019-01-08 DIAGNOSIS — M96.1 POSTLAMINECTOMY SYNDROME OF LUMBAR REGION: ICD-10-CM

## 2019-01-08 DIAGNOSIS — M51.36 DDD (DEGENERATIVE DISC DISEASE), LUMBAR: ICD-10-CM

## 2019-01-08 PROCEDURE — 99213 OFFICE O/P EST LOW 20 MIN: CPT | Mod: PBBFAC,PN | Performed by: PHYSICIAN ASSISTANT

## 2019-01-08 PROCEDURE — 99214 PR OFFICE/OUTPT VISIT, EST, LEVL IV, 30-39 MIN: ICD-10-PCS | Mod: S$PBB,,, | Performed by: PHYSICIAN ASSISTANT

## 2019-01-08 PROCEDURE — 99214 OFFICE O/P EST MOD 30 MIN: CPT | Mod: S$PBB,,, | Performed by: PHYSICIAN ASSISTANT

## 2019-01-08 PROCEDURE — 99999 PR PBB SHADOW E&M-EST. PATIENT-LVL III: ICD-10-PCS | Mod: PBBFAC,,, | Performed by: PHYSICIAN ASSISTANT

## 2019-01-08 PROCEDURE — 99999 PR PBB SHADOW E&M-EST. PATIENT-LVL III: CPT | Mod: PBBFAC,,, | Performed by: PHYSICIAN ASSISTANT

## 2019-01-08 RX ORDER — TRAMADOL HYDROCHLORIDE 50 MG/1
50 TABLET ORAL EVERY 12 HOURS PRN
Qty: 30 TABLET | Refills: 2 | Status: SHIPPED | OUTPATIENT
Start: 2019-02-05 | End: 2019-01-08 | Stop reason: SDUPTHER

## 2019-01-08 RX ORDER — TRAMADOL HYDROCHLORIDE 50 MG/1
50 TABLET ORAL EVERY 12 HOURS PRN
Qty: 30 TABLET | Refills: 2 | Status: SHIPPED | OUTPATIENT
Start: 2019-02-05 | End: 2019-04-30 | Stop reason: SDUPTHER

## 2019-01-08 RX ORDER — ENALAPRIL MALEATE 10 MG/1
10 TABLET ORAL
Status: ON HOLD | COMMUNITY
End: 2019-01-25

## 2019-01-08 NOTE — H&P (VIEW-ONLY)
Referring Physician: No ref. provider found    PCP: Sara Quan NP      CC:  Neck and lower back pain    Interval History:  Marshall Barboza is a 48 y.o. male with chronic neck and low back pain who presents today for f/u and medication refills. He reports pain has returned. He had a lumbar TFESI bilaterally at L4-5 in August and September. He obtained good relief for several months. Pain is the same quality and location.  Denies worsening LE weakness. Denies b/b changes. He take Tramadol sparingly. He is undergoing aquatic therapy as well as completing a home renovation. Both are exacerbating pain.  Pain today is rated 7/10.    HPI:   Marshall Barboza is a 48 y.o. male referred to us for neck and lower back pain.  Patient is a former active  personnel.  He has significant history of cervical and lumbar spine surgery.  He underwent L4-5 laminotomy and diskectomy in February 2015.  Radicular pain improved but he continues to have lower back pain.  He also was found to have cervical DDD.  He underwent a C4-C7 ACDF.  Lower back and right leg pain is more bothersome currently.  He has constant aching, throbbing pain in his lower back.  Pain radiates to down his right buttock into his right calf.  Pain occasionally travels down the left calf.  He has had lumbar MB RFA procedures in the past at the  with short-lived relief.  He has undergone lumbar ESIwith moderate benefit for 3 months.  He currently takes tramadol very sparingly with mild-to-moderate benefit.  He denies any worsening weakness.  No bowel bladder changes.    ROS:  CONSTITUTIONAL: No fevers, chills, night sweats, wt. loss, appetite changes  SKIN: no rashes or itching  ENT: No headaches, head trauma, vision changes, or eye pain  LYMPH NODES: None noticed   CV: No chest pain, palpitations.   RESP: No shortness of breath, dyspnea on exertion, cough, wheezing, or hemoptysis  GI: No nausea, emesis, diarrhea, constipation, melena, hematochezia,  "pain.    : No dysuria, hematuria, urgency, or frequency   HEME: No easy bruising, bleeding problems  PSYCHIATRIC: No depression, anxiety, psychosis, hallucinations.  NEURO: No seizures, memory loss, dizziness or difficulty sleeping  MSK:  Positive per HPI    Past Medical History:   Diagnosis Date    Allergy     Arthritis     Depression     Hypertension     Lumbar radiculitis 8/2/2018     Past Surgical History:   Procedure Laterality Date    BREAST SURGERY      FRACTURE SURGERY      HERNIA REPAIR      Injection,steroid,epidural,transforaminal approach Bilateral 9/14/2018    Per[facility]    Injection,steroid,epidural,transforaminal approach Bilateral 8/2/2018    [facility]    SPINE SURGERY       History reviewed. No pertinent family history.  Social History     Socioeconomic History    Marital status:      Spouse name: None    Number of children: None    Years of education: None    Highest education level: None   Social Needs    Financial resource strain: None    Food insecurity - worry: None    Food insecurity - inability: None    Transportation needs - medical: None    Transportation needs - non-medical: None   Occupational History    None   Tobacco Use    Smoking status: None    Smokeless tobacco: Never Used   Substance and Sexual Activity    Alcohol use: Yes     Alcohol/week: 0.6 oz     Types: 1 Cans of beer per week    Drug use: No    Sexual activity: Yes   Other Topics Concern    None   Social History Narrative    None         Medications/Allergies: See med card    Vitals:    01/08/19 0912   BP: (!) 148/92   Pulse: 76   Weight: 81.6 kg (180 lb)   Height: 5' 9" (1.753 m)   PainSc:   7   PainLoc: Back         Physical exam:    GENERAL: A and O x3, the patient appears well groomed and is in no acute distress.  Skin: No rashes or obvious lesions  HEENT: normocephalic, atraumatic  CARDIOVASCULAR:  RRR  LUNGS: non labored " breathing  ABDOMEN: soft, nontender   UPPER EXTREMITIES: Normal alignment, normal range of motion, no atrophy, no skin changes,  hair growth and nail growth normal and equal bilaterally. No swelling, no tenderness.    LOWER EXTREMITIES:  Normal alignment, normal range of motion, no atrophy, no skin changes,  hair growth and nail growth normal and equal bilaterally. No swelling, no tenderness.  CERVICAL SPINE:  Cervical spine: ROM is limited in flexion, extension and lateral rotation with moderate increased pain.  Spurling's maneuver causes no neck pain to either side.  Myofascial exam: No Tenderness to palpation across cervical paraspinous region bilaterally.    LUMBAR SPINE  Lumbar spine: ROM is full with flexion extension and oblique extension with moderate increased pain.    Darrel's test causes no increased pain on either side.    Supine straight leg raise is positive on the right at 45°  Internal and external rotation of the hip causes no increased pain on either side.  Myofascial exam: No tenderness to palpation across lumbar paraspinous muscles.      MENTAL STATUS: normal orientation, speech, language, and fund of knowledge for social situation.  Emotional state appropriate.    CRANIAL NERVES:  II:  PERRL bilaterally,   III,IV,VI: EOMI.    V:  Facial sensation equal bilaterally  VII:  Facial motor function normal.  VIII:  Hearing equal to finger rub bilaterally  IX/X: Gag normal, palate symmetric  XI:  Shoulder shrug equal, head turn equal  XII:  Tongue midline without fasciculations      MOTOR: Tone and bulk: normal bilateral upper and lower Strength: normal   Delt Bi Tri WE WF     R 5 5 5 5 5 5   L 5 5 5 5 5 5     IP ADD ABD Quad TA Gas HAM  R 5 5 5 5 5 5 5  L 5 5 5 5 5 5 5    SENSATION: Light touch and pinprick intact bilaterally  REFLEXES: normal, symmetric, nonbrisk.  Toes down, no clonus. No hoffmans.  GAIT:  Uses cane for assistance       Imaging:  MRI lumbar spine 01/2016 (outside  records)  Lumbar spondylosis with small posterior disc protrusion L4-5 and L5-S1 with moderate bilateral neuroforaminal narrowing at L4-5.  Has postoperative changes of a laminotomy at L4-5 noted.    MRI cervical spine 05/2015  Postoperative changes of total disc arthroplasty at C4-5 anterior fusion with interbody spaces at C5-6 and C6-7.    Assessment:  Marshall Barboza is a 48 y.o. male with neck and lower back pain  1. Lumbar radiculopathy    2. DDD (degenerative disc disease), lumbar    3. Postlaminectomy syndrome of lumbar region      Plan:  1. I have stressed the importance of physical activity and exercise to improve overall health  2. Schedule repeat lumbar epidural steroid injection to the Bilateral L4-5 level(s).  I have explained the risks, benefits, and alternatives of the procedure in detail. The patient voices understanding and all questions have been answered. The patient agrees to proceed as planned. Written Consent obtained.   3. May consider cervical DEMETRIA if neck pain does not improve as well  4. He continue tramadol 50 mg q.day as needed.  He takes this very sparingly.  5. Continue Aquatic therapy  6. Follow-up 3 months or sooner

## 2019-01-08 NOTE — PROGRESS NOTES
Referring Physician: No ref. provider found    PCP: Sara Quan NP      CC:  Neck and lower back pain    Interval History:  Marshall Barboza is a 48 y.o. male with chronic neck and low back pain who presents today for f/u and medication refills. He reports pain has returned. He had a lumbar TFESI bilaterally at L4-5 in August and September. He obtained good relief for several months. Pain is the same quality and location.  Denies worsening LE weakness. Denies b/b changes. He take Tramadol sparingly. He is undergoing aquatic therapy as well as completing a home renovation. Both are exacerbating pain.  Pain today is rated 7/10.    HPI:   Marshall Barboza is a 48 y.o. male referred to us for neck and lower back pain.  Patient is a former active  personnel.  He has significant history of cervical and lumbar spine surgery.  He underwent L4-5 laminotomy and diskectomy in February 2015.  Radicular pain improved but he continues to have lower back pain.  He also was found to have cervical DDD.  He underwent a C4-C7 ACDF.  Lower back and right leg pain is more bothersome currently.  He has constant aching, throbbing pain in his lower back.  Pain radiates to down his right buttock into his right calf.  Pain occasionally travels down the left calf.  He has had lumbar MB RFA procedures in the past at the  with short-lived relief.  He has undergone lumbar ESIwith moderate benefit for 3 months.  He currently takes tramadol very sparingly with mild-to-moderate benefit.  He denies any worsening weakness.  No bowel bladder changes.    ROS:  CONSTITUTIONAL: No fevers, chills, night sweats, wt. loss, appetite changes  SKIN: no rashes or itching  ENT: No headaches, head trauma, vision changes, or eye pain  LYMPH NODES: None noticed   CV: No chest pain, palpitations.   RESP: No shortness of breath, dyspnea on exertion, cough, wheezing, or hemoptysis  GI: No nausea, emesis, diarrhea, constipation, melena, hematochezia,  "pain.    : No dysuria, hematuria, urgency, or frequency   HEME: No easy bruising, bleeding problems  PSYCHIATRIC: No depression, anxiety, psychosis, hallucinations.  NEURO: No seizures, memory loss, dizziness or difficulty sleeping  MSK:  Positive per HPI    Past Medical History:   Diagnosis Date    Allergy     Arthritis     Depression     Hypertension     Lumbar radiculitis 8/2/2018     Past Surgical History:   Procedure Laterality Date    BREAST SURGERY      FRACTURE SURGERY      HERNIA REPAIR      Injection,steroid,epidural,transforaminal approach Bilateral 9/14/2018    Performed [facility] at [facility]    Injection,steroid,epidural,transforaminal approach Bilateral 8/2/2018    Performed by [facility]io Vaqzuez [facility] at [facility]    SPINE SURGERY       History reviewed. No pertinent family history.  Social History     Socioeconomic History    Marital status:      Spouse name: None    Number of children: None    Years of education: None    Highest education level: None   Social Needs    Financial resource strain: None    Food insecurity - worry: None    Food insecurity - inability: None    Transportation needs - medical: None    Transportation needs - non-medical: None   Occupational History    None   Tobacco Use    Smoking status: None    Smokeless tobacco: Never Used   Substance and Sexual Activity    Alcohol use: Yes     Alcohol/week: 0.6 oz     Types: 1 Cans of beer per week    Drug use: No    Sexual activity: Yes   Other Topics Concern    None   Social History Narrative    None         Medications/Allergies: See med card    Vitals:    01/08/19 0912   BP: (!) 148/92   Pulse: 76   Weight: 81.6 kg (180 lb)   Height: 5' 9" (1.753 m)   PainSc:   7   PainLoc: Back         Physical exam:    GENERAL: A and O x3, the patient appears well groomed and is in no acute distress.  Skin: No rashes or obvious lesions  HEENT: normocephalic, atraumatic  CARDIOVASCULAR:  RRR  LUNGS: non labored " breathing  ABDOMEN: soft, nontender   UPPER EXTREMITIES: Normal alignment, normal range of motion, no atrophy, no skin changes,  hair growth and nail growth normal and equal bilaterally. No swelling, no tenderness.    LOWER EXTREMITIES:  Normal alignment, normal range of motion, no atrophy, no skin changes,  hair growth and nail growth normal and equal bilaterally. No swelling, no tenderness.  CERVICAL SPINE:  Cervical spine: ROM is limited in flexion, extension and lateral rotation with moderate increased pain.  Spurling's maneuver causes no neck pain to either side.  Myofascial exam: No Tenderness to palpation across cervical paraspinous region bilaterally.    LUMBAR SPINE  Lumbar spine: ROM is full with flexion extension and oblique extension with moderate increased pain.    Darrel's test causes no increased pain on either side.    Supine straight leg raise is positive on the right at 45°  Internal and external rotation of the hip causes no increased pain on either side.  Myofascial exam: No tenderness to palpation across lumbar paraspinous muscles.      MENTAL STATUS: normal orientation, speech, language, and fund of knowledge for social situation.  Emotional state appropriate.    CRANIAL NERVES:  II:  PERRL bilaterally,   III,IV,VI: EOMI.    V:  Facial sensation equal bilaterally  VII:  Facial motor function normal.  VIII:  Hearing equal to finger rub bilaterally  IX/X: Gag normal, palate symmetric  XI:  Shoulder shrug equal, head turn equal  XII:  Tongue midline without fasciculations      MOTOR: Tone and bulk: normal bilateral upper and lower Strength: normal   Delt Bi Tri WE WF     R 5 5 5 5 5 5   L 5 5 5 5 5 5     IP ADD ABD Quad TA Gas HAM  R 5 5 5 5 5 5 5  L 5 5 5 5 5 5 5    SENSATION: Light touch and pinprick intact bilaterally  REFLEXES: normal, symmetric, nonbrisk.  Toes down, no clonus. No hoffmans.  GAIT:  Uses cane for assistance       Imaging:  MRI lumbar spine 01/2016 (outside  records)  Lumbar spondylosis with small posterior disc protrusion L4-5 and L5-S1 with moderate bilateral neuroforaminal narrowing at L4-5.  Has postoperative changes of a laminotomy at L4-5 noted.    MRI cervical spine 05/2015  Postoperative changes of total disc arthroplasty at C4-5 anterior fusion with interbody spaces at C5-6 and C6-7.    Assessment:  Marshall Barboza is a 48 y.o. male with neck and lower back pain  1. Lumbar radiculopathy    2. DDD (degenerative disc disease), lumbar    3. Postlaminectomy syndrome of lumbar region      Plan:  1. I have stressed the importance of physical activity and exercise to improve overall health  2. Schedule repeat lumbar epidural steroid injection to the Bilateral L4-5 level(s).  I have explained the risks, benefits, and alternatives of the procedure in detail. The patient voices understanding and all questions have been answered. The patient agrees to proceed as planned. Written Consent obtained.   3. May consider cervical DEMETRIA if neck pain does not improve as well  4. He continue tramadol 50 mg q.day as needed.  He takes this very sparingly.  5. Continue Aquatic therapy  6. Follow-up 3 months or sooner

## 2019-01-24 RX ORDER — IBUPROFEN 600 MG/1
800 TABLET ORAL 3 TIMES DAILY
Status: ON HOLD | COMMUNITY
End: 2019-01-25 | Stop reason: CLARIF

## 2019-01-25 ENCOUNTER — HOSPITAL ENCOUNTER (OUTPATIENT)
Facility: AMBULARY SURGERY CENTER | Age: 49
Discharge: HOME | End: 2019-01-25
Attending: ANESTHESIOLOGY | Admitting: ANESTHESIOLOGY
Payer: OTHER GOVERNMENT

## 2019-01-25 DIAGNOSIS — M54.16 LUMBAR RADICULITIS: Primary | ICD-10-CM

## 2019-01-25 PROCEDURE — 64483 NJX AA&/STRD TFRM EPI L/S 1: CPT | Mod: 50,,, | Performed by: ANESTHESIOLOGY

## 2019-01-25 PROCEDURE — 64483 NJX AA&/STRD TFRM EPI L/S 1: CPT | Mod: LT | Performed by: ANESTHESIOLOGY

## 2019-01-25 PROCEDURE — 64483 PR EPIDURAL INJ, ANES/STEROID, TRANSFORAMINAL, LUMB/SACR, SNGL LEVL: ICD-10-PCS | Mod: 50,,, | Performed by: ANESTHESIOLOGY

## 2019-01-25 RX ORDER — BUPIVACAINE HYDROCHLORIDE 2.5 MG/ML
INJECTION, SOLUTION EPIDURAL; INFILTRATION; INTRACAUDAL
Status: DISCONTINUED | OUTPATIENT
Start: 2019-01-25 | End: 2019-01-25 | Stop reason: HOSPADM

## 2019-01-25 RX ORDER — DEXAMETHASONE SODIUM PHOSPHATE 10 MG/ML
INJECTION INTRAMUSCULAR; INTRAVENOUS
Status: DISCONTINUED
Start: 2019-01-25 | End: 2019-01-25 | Stop reason: HOSPADM

## 2019-01-25 RX ORDER — FENTANYL CITRATE 50 UG/ML
INJECTION, SOLUTION INTRAMUSCULAR; INTRAVENOUS
Status: DISCONTINUED
Start: 2019-01-25 | End: 2019-01-25 | Stop reason: WASHOUT

## 2019-01-25 RX ORDER — LIDOCAINE HYDROCHLORIDE 10 MG/ML
INJECTION, SOLUTION EPIDURAL; INFILTRATION; INTRACAUDAL; PERINEURAL
Status: DISCONTINUED | OUTPATIENT
Start: 2019-01-25 | End: 2019-01-25 | Stop reason: HOSPADM

## 2019-01-25 RX ORDER — SODIUM CHLORIDE, SODIUM LACTATE, POTASSIUM CHLORIDE, CALCIUM CHLORIDE 600; 310; 30; 20 MG/100ML; MG/100ML; MG/100ML; MG/100ML
INJECTION, SOLUTION INTRAVENOUS ONCE AS NEEDED
Status: DISCONTINUED | OUTPATIENT
Start: 2019-01-25 | End: 2019-01-25 | Stop reason: HOSPADM

## 2019-01-25 RX ORDER — LIDOCAINE HYDROCHLORIDE 10 MG/ML
INJECTION, SOLUTION EPIDURAL; INFILTRATION; INTRACAUDAL; PERINEURAL
Status: DISCONTINUED
Start: 2019-01-25 | End: 2019-01-25 | Stop reason: HOSPADM

## 2019-01-25 RX ORDER — IBUPROFEN 800 MG/1
800 TABLET ORAL 3 TIMES DAILY
COMMUNITY

## 2019-01-25 RX ORDER — DEXAMETHASONE SODIUM PHOSPHATE 10 MG/ML
INJECTION INTRAMUSCULAR; INTRAVENOUS
Status: DISCONTINUED | OUTPATIENT
Start: 2019-01-25 | End: 2019-01-25 | Stop reason: HOSPADM

## 2019-01-25 RX ORDER — MIDAZOLAM HYDROCHLORIDE 1 MG/ML
INJECTION INTRAMUSCULAR; INTRAVENOUS
Status: DISCONTINUED
Start: 2019-01-25 | End: 2019-01-25 | Stop reason: WASHOUT

## 2019-01-25 NOTE — PLAN OF CARE
Patient able to left each leg prior to standing and walk to the waiting room. His wife is driving him home.

## 2019-01-25 NOTE — DISCHARGE SUMMARY
[facility]  Discharge Note  Short Stay    Admit Date: 1/25/2019    Discharge Date and Time: 1/25/2019    Attending Physician: Fidencio Vazquez MD     Discharge Provider: Fidencio Vazquez    Diagnoses:  Active Hospital Problems    Diagnosis  POA    *Lumbar radiculitis [M54.16]  Yes      Resolved Hospital Problems   No resolved problems to display.       Hospital Course: Lumbar DEMETRIA  Discharged Condition: Good    Final Diagnoses:   Active Hospital Problems    Diagnosis  POA    *Lumbar radiculitis [M54.16]  Yes      Resolved Hospital Problems   No resolved problems to display.       Disposition: Home or Self Care    Follow up/Patient Instructions:    Medications:  Reconciled Home Medications:      Medication List      CONTINUE taking these medications    acyclovir 800 MG Tab  Commonly known as:  ZOVIRAX     amLODIPine 10 MG tablet  Commonly known as:  NORVASC     hydroCHLOROthiazide 25 MG tablet  Commonly known as:  HYDRODIURIL  Take 25 mg by mouth once daily.     ibuprofen 800 MG tablet  Commonly known as:  ADVIL,MOTRIN  Take 800 mg by mouth 3 (three) times daily.     meloxicam 15 MG tablet  Commonly known as:  MOBIC     methocarbamol 500 MG Tab  Commonly known as:  ROBAXIN     topiramate 50 MG tablet  Commonly known as:  TOPAMAX     traMADol 50 mg tablet  Commonly known as:  ULTRAM  Take 1 tablet (50 mg total) by mouth every 12 (twelve) hours as needed for Pain.  Start taking on:  2/5/2019          Discharge Procedure Orders   Call MD for:  temperature >100.4     Call MD for:  persistent nausea and vomiting or diarrhea     Call MD for:  severe uncontrolled pain     Call MD for:  redness, tenderness, or signs of infection (pain, swelling, redness, odor or green/yellow discharge around incision site)     Call MD for:  difficulty breathing or increased cough     Call MD for:  severe persistent headache        Follow up with MD in 2-3 weeks    Discharge Procedure Orders (must include Diet, Follow-up, Activity):    Discharge Procedure Orders (must include Diet, Follow-up, Activity)   Call MD for:  temperature >100.4     Call MD for:  persistent nausea and vomiting or diarrhea     Call MD for:  severe uncontrolled pain     Call MD for:  redness, tenderness, or signs of infection (pain, swelling, redness, odor or green/yellow discharge around incision site)     Call MD for:  difficulty breathing or increased cough     Call MD for:  severe persistent headache

## 2019-01-25 NOTE — OP NOTE
PROCEDURE DATE: 1/25/2019    PROCEDURE: Bilateral L4-5 transforaminal epidural steroid injection under fluoroscopy    DIAGNOSIS: Lumbar disc displacement without myelopathy  Post op diagnosis: Same    PHYSICIAN: Fidencio Vazquez MD    MEDICATIONS INJECTED:  Dexamethasone 5mg (0.5ml) and 1.5ml 0.25% bupivicaine at each nerve root.     LOCAL ANESTHETIC INJECTED:  Lidocaine 1%. 2 ml per site.    SEDATION MEDICATIONS: None    ESTIMATED BLOOD LOSS:  None    COMPLICATIONS:  None    TECHNIQUE:   A time-out was taken to identify patient and procedure side prior to starting the procedure. The patient was placed in a prone position, prepped and draped in the usual sterile fashion using ChloraPrep and sterile towels.  The area to be injected was determined under fluoroscopic guidance in AP and oblique view.  Local anesthetic was given by raising a wheal and going down to the hub of a 25-gauge 1.5 inch needle.  In oblique view, a 3.5 inch 22-gauge bent-tip spinal needle was introduced towards 6 oclock position of the pedicle of each above named nerve root level.  The needle was walked medially then hinged into the neural foramen and position was confirmed in AP and lateral views.  1ml contrast dye was injected to confirm appropriate placement and that there was no vascular uptake.  After negative aspiration for blood or CSF, the medication was then injected. This was performed at the bilateral L4-5 level(s). The patient tolerated the procedure well.    The patient was monitored after the procedure.  Patient was given post procedure and discharge instructions to follow at home. The patient was discharged in a stable condition.

## 2019-01-25 NOTE — DISCHARGE INSTRUCTIONS
Pain injection instructions:     This procedure may take a couple weeks to relieve pain  You may get some pain relief from the local anesthetic initally.    No driving for 24 hrs.   Activity as tolerated- gradually increase activities.  Dont lift over 10 lbs for 24 hrs   No heat at injection sites x 2 days. No heating pads, hot tubs, saunas, or swimming in any body of water or pool for 2 days.  Use ice pack for mild swelling and for comfort , apply for 20 minutes, remove for 20 minute intervals. No direct contact of ice itself  to skin.  May shower today. Do not allow shower water to hit injection site for 2 days.No tub baths for two days.      Resume Aspirin, Plavix, or Coumadin the day after the procedure unless otherwise instructed.   If diabetic,monitor your glucose carefully as steroids can increase your glucose level    Seek immediate medical help for:   Severe increase in your usual pain or appearance of new pain.  Prolonged (more than 8 hours) or increasing weakness or numbness in the legs or arms. Numbing medicine was injected and can affect the messages to and from the brain and legs or arms.  .    Fever above 100.4F ,Drainage,redness,active bleeding, or increased swelling at the injection site.  Headache, shortness of breath, chest pain, or breathing problems.

## 2019-01-28 VITALS
TEMPERATURE: 98 F | OXYGEN SATURATION: 99 % | SYSTOLIC BLOOD PRESSURE: 121 MMHG | DIASTOLIC BLOOD PRESSURE: 85 MMHG | HEIGHT: 69 IN | HEART RATE: 72 BPM | BODY MASS INDEX: 26.66 KG/M2 | WEIGHT: 180 LBS | RESPIRATION RATE: 18 BRPM

## 2019-02-25 ENCOUNTER — OFFICE VISIT (OUTPATIENT)
Dept: PAIN MEDICINE | Facility: CLINIC | Age: 49
End: 2019-02-25
Payer: OTHER GOVERNMENT

## 2019-02-25 VITALS
BODY MASS INDEX: 26.66 KG/M2 | HEIGHT: 69 IN | DIASTOLIC BLOOD PRESSURE: 83 MMHG | WEIGHT: 180 LBS | HEART RATE: 68 BPM | SYSTOLIC BLOOD PRESSURE: 135 MMHG

## 2019-02-25 DIAGNOSIS — M54.16 LUMBAR RADICULITIS: ICD-10-CM

## 2019-02-25 DIAGNOSIS — M96.1 POSTLAMINECTOMY SYNDROME OF LUMBAR REGION: ICD-10-CM

## 2019-02-25 DIAGNOSIS — M51.36 DDD (DEGENERATIVE DISC DISEASE), LUMBAR: Primary | ICD-10-CM

## 2019-02-25 PROCEDURE — 99999 PR PBB SHADOW E&M-EST. PATIENT-LVL II: CPT | Mod: PBBFAC,,, | Performed by: ANESTHESIOLOGY

## 2019-02-25 PROCEDURE — 99212 OFFICE O/P EST SF 10 MIN: CPT | Mod: PBBFAC,PN | Performed by: ANESTHESIOLOGY

## 2019-02-25 PROCEDURE — 99999 PR PBB SHADOW E&M-EST. PATIENT-LVL II: ICD-10-PCS | Mod: PBBFAC,,, | Performed by: ANESTHESIOLOGY

## 2019-02-25 PROCEDURE — 99214 PR OFFICE/OUTPT VISIT, EST, LEVL IV, 30-39 MIN: ICD-10-PCS | Mod: S$PBB,,, | Performed by: ANESTHESIOLOGY

## 2019-02-25 PROCEDURE — 99214 OFFICE O/P EST MOD 30 MIN: CPT | Mod: S$PBB,,, | Performed by: ANESTHESIOLOGY

## 2019-02-25 NOTE — PROGRESS NOTES
Referring Physician: No ref. provider found    PCP: Sraa Quan NP      CC:  Neck and lower back pain    Interval History:  Marshall Barboza is a 48 y.o. male with chronic neck and low back pain who returns for visit.  He underwent a repeat lumbar DEMETRIA on January 25, 2019 with moderate benefit of his low back and leg pain. He desires a 2nd procedure for further compounding benefit.  He underwent a series of 2 injections back in August and September 2018 which provided moderate benefit for several months.  Denies worsening LE weakness. Denies b/b changes. He take Tramadol sparingly.   Pain today is rated 7/10.    Prior HPI:   Marshall Barboza is a 48 y.o. male referred to us for neck and lower back pain.  Patient is a former active  personnel.  He has significant history of cervical and lumbar spine surgery.  He underwent L4-5 laminotomy and diskectomy in February 2015.  Radicular pain improved but he continues to have lower back pain.  He also was found to have cervical DDD.  He underwent a C4-C7 ACDF.  Lower back and right leg pain is more bothersome currently.  He has constant aching, throbbing pain in his lower back.  Pain radiates to down his right buttock into his right calf.  Pain occasionally travels down the left calf.  He has had lumbar MB RFA procedures in the past at the  with short-lived relief.  He has undergone lumbar ESIwith moderate benefit for 3 months.  He currently takes tramadol very sparingly with mild-to-moderate benefit.  He denies any worsening weakness.  No bowel bladder changes.    ROS:  CONSTITUTIONAL: No fevers, chills, night sweats, wt. loss, appetite changes  SKIN: no rashes or itching  ENT: No headaches, head trauma, vision changes, or eye pain  LYMPH NODES: None noticed   CV: No chest pain, palpitations.   RESP: No shortness of breath, dyspnea on exertion, cough, wheezing, or hemoptysis  GI: No nausea, emesis, diarrhea, constipation, melena, hematochezia, pain.    :  "No dysuria, hematuria, urgency, or frequency   HEME: No easy bruising, bleeding problems  PSYCHIATRIC: No depression, anxiety, psychosis, hallucinations.  NEURO: No seizures, memory loss, dizziness or difficulty sleeping  MSK:  Positive per HPI    Past Medical History:   Diagnosis Date    Allergy     Arthritis     Depression     Hypertension     Lumbar radiculitis 8/2/2018     Past Surgical History:   Procedure Laterality Date    BREAST SURGERY      FRACTURE SURGERY      HERNIA REPAIR      Injection,steroid,epidural,transforaminal approach Bilateral 9/14/2018    [facility]    Injection,steroid,epidural,transforaminal approach Bilateral 8/2/2018    [facility]    Injection,steroid,epidural,transforaminal approach L4-5 Bilateral 1/25/2019    [facility]    SPINE SURGERY       History reviewed. No pertinent family history.  Social History     Socioeconomic History    Marital status:      Spouse name: None    Number of children: None    Years of education: None    Highest education level: None   Social Needs    Financial resource strain: None    Food insecurity - worry: None    Food insecurity - inability: None    Transportation needs - medical: None    Transportation needs - non-medical: None   Occupational History    None   Tobacco Use    Smoking status: None    Smokeless tobacco: Never Used   Substance and Sexual Activity    Alcohol use: Yes     Alcohol/week: 0.6 oz     Types: 1 Cans of beer per week    Drug use: No    Sexual activity: Yes   Other Topics Concern    None   Social History Narrative    None         Medications/Allergies: See med card    Vitals:    02/25/19 1046   BP: 135/83   Pulse: 68   Weight: 81.6 kg (180 lb)   Height: 5' 9" (1.753 m)   PainSc:   7   PainLoc: Back         Physical exam:    GENERAL: A and O x3, the patient appears well groomed and is in no acute distress.  Skin: No " rashes or obvious lesions  HEENT: normocephalic, atraumatic  CARDIOVASCULAR:  RRR  LUNGS: non labored breathing  ABDOMEN: soft, nontender   UPPER EXTREMITIES: Normal alignment, normal range of motion, no atrophy, no skin changes,  hair growth and nail growth normal and equal bilaterally. No swelling, no tenderness.    LOWER EXTREMITIES:  Normal alignment, normal range of motion, no atrophy, no skin changes,  hair growth and nail growth normal and equal bilaterally. No swelling, no tenderness.  CERVICAL SPINE:  Cervical spine: ROM is limited in flexion, extension and lateral rotation with moderate increased pain.  Spurling's maneuver causes no neck pain to either side.  Myofascial exam: No Tenderness to palpation across cervical paraspinous region bilaterally.    LUMBAR SPINE  Lumbar spine: ROM is full with flexion extension and oblique extension with moderate increased pain.    Darrel's test causes no increased pain on either side.    Supine straight leg raise is positive on the right at 45°  Internal and external rotation of the hip causes no increased pain on either side.  Myofascial exam: No tenderness to palpation across lumbar paraspinous muscles.      MENTAL STATUS: normal orientation, speech, language, and fund of knowledge for social situation.  Emotional state appropriate.    CRANIAL NERVES:  II:  PERRL bilaterally,   III,IV,VI: EOMI.    V:  Facial sensation equal bilaterally  VII:  Facial motor function normal.  VIII:  Hearing equal to finger rub bilaterally  IX/X: Gag normal, palate symmetric  XI:  Shoulder shrug equal, head turn equal  XII:  Tongue midline without fasciculations      MOTOR: Tone and bulk: normal bilateral upper and lower Strength: normal   Delt Bi Tri WE WF     R 5 5 5 5 5 5   L 5 5 5 5 5 5     IP ADD ABD Quad TA Gas HAM  R 5 5 5 5 5 5 5  L 5 5 5 5 5 5 5    SENSATION: Light touch and pinprick intact bilaterally  REFLEXES: normal, symmetric, nonbrisk.  Toes down, no clonus. No  radha.  GAIT:  Uses cane for assistance       Imaging:  MRI lumbar spine 01/2016 (outside records)  Lumbar spondylosis with small posterior disc protrusion L4-5 and L5-S1 with moderate bilateral neuroforaminal narrowing at L4-5.  Has postoperative changes of a laminotomy at L4-5 noted.    MRI cervical spine 05/2015  Postoperative changes of total disc arthroplasty at C4-5 anterior fusion with interbody spaces at C5-6 and C6-7.    Assessment:  Marshall Barboza is a 48 y.o. male with neck and lower back pain  1. DDD (degenerative disc disease), lumbar    2. Postlaminectomy syndrome of lumbar region    3. Lumbar radiculitis      Plan:  1. I have stressed the importance of physical activity and exercise to improve overall health  2. Schedule repeat lumbar epidural steroid injection to the left L4-5 and L5-S1 level(s).  I have explained the risks, benefits, and alternatives of the procedure in detail. The patient voices understanding and all questions have been answered. The patient agrees to proceed as planned. Written Consent obtained.   3. May consider cervical DEMETRIA if neck pain does not improve as well  4. He continue tramadol 50 mg q.day as needed.  He takes this very sparingly.  5. Continue Aquatic therapy  6. Follow-up 3 months or sooner

## 2019-02-25 NOTE — H&P (VIEW-ONLY)
Referring Physician: No ref. provider found    PCP: Sara Quan NP      CC:  Neck and lower back pain    Interval History:  Marshall Barboza is a 48 y.o. male with chronic neck and low back pain who returns for visit.  He underwent a repeat lumbar DEMETRIA on January 25, 2019 with moderate benefit of his low back and leg pain. He desires a 2nd procedure for further compounding benefit.  He underwent a series of 2 injections back in August and September 2018 which provided moderate benefit for several months.  Denies worsening LE weakness. Denies b/b changes. He take Tramadol sparingly.   Pain today is rated 7/10.    Prior HPI:   Marshall Barboza is a 48 y.o. male referred to us for neck and lower back pain.  Patient is a former active  personnel.  He has significant history of cervical and lumbar spine surgery.  He underwent L4-5 laminotomy and diskectomy in February 2015.  Radicular pain improved but he continues to have lower back pain.  He also was found to have cervical DDD.  He underwent a C4-C7 ACDF.  Lower back and right leg pain is more bothersome currently.  He has constant aching, throbbing pain in his lower back.  Pain radiates to down his right buttock into his right calf.  Pain occasionally travels down the left calf.  He has had lumbar MB RFA procedures in the past at the  with short-lived relief.  He has undergone lumbar ESIwith moderate benefit for 3 months.  He currently takes tramadol very sparingly with mild-to-moderate benefit.  He denies any worsening weakness.  No bowel bladder changes.    ROS:  CONSTITUTIONAL: No fevers, chills, night sweats, wt. loss, appetite changes  SKIN: no rashes or itching  ENT: No headaches, head trauma, vision changes, or eye pain  LYMPH NODES: None noticed   CV: No chest pain, palpitations.   RESP: No shortness of breath, dyspnea on exertion, cough, wheezing, or hemoptysis  GI: No nausea, emesis, diarrhea, constipation, melena, hematochezia, pain.    :  "No dysuria, hematuria, urgency, or frequency   HEME: No easy bruising, bleeding problems  PSYCHIATRIC: No depression, anxiety, psychosis, hallucinations.  NEURO: No seizures, memory loss, dizziness or difficulty sleeping  MSK:  Positive per HPI    Past Medical History:   Diagnosis Date    Allergy     Arthritis     Depression     Hypertension     Lumbar radiculitis 8/2/2018     Past Surgical History:   Procedure Laterality Date    BREAST SURGERY      FRACTURE SURGERY      HERNIA REPAIR      Injection,steroid,epidural,transforaminal approach Bilateral 9/14/2018    [facility]    Injection,steroid,epidural,transforaminal approach Bilateral 8/2/2018    [facility]    Injection,steroid,epidural,transforaminal approach L4-5 Bilateral 1/25/2019    Per[facility] at [facility]    SPINE SURGERY       History reviewed. No pertinent family history.  Social History     Socioeconomic History    Marital status:      Spouse name: None    Number of children: None    Years of education: None    Highest education level: None   Social Needs    Financial resource strain: None    Food insecurity - worry: None    Food insecurity - inability: None    Transportation needs - medical: None    Transportation needs - non-medical: None   Occupational History    None   Tobacco Use    Smoking status: None    Smokeless tobacco: Never Used   Substance and Sexual Activity    Alcohol use: Yes     Alcohol/week: 0.6 oz     Types: 1 Cans of beer per week    Drug use: No    Sexual activity: Yes   Other Topics Concern    None   Social History Narrative    None         Medications/Allergies: See med card    Vitals:    02/25/19 1046   BP: 135/83   Pulse: 68   Weight: 81.6 kg (180 lb)   Height: 5' 9" (1.753 m)   PainSc:   7   PainLoc: Back         Physical exam:    GENERAL: A and O x3, the patient appears well groomed and is in no acute distress.  Skin: No " rashes or obvious lesions  HEENT: normocephalic, atraumatic  CARDIOVASCULAR:  RRR  LUNGS: non labored breathing  ABDOMEN: soft, nontender   UPPER EXTREMITIES: Normal alignment, normal range of motion, no atrophy, no skin changes,  hair growth and nail growth normal and equal bilaterally. No swelling, no tenderness.    LOWER EXTREMITIES:  Normal alignment, normal range of motion, no atrophy, no skin changes,  hair growth and nail growth normal and equal bilaterally. No swelling, no tenderness.  CERVICAL SPINE:  Cervical spine: ROM is limited in flexion, extension and lateral rotation with moderate increased pain.  Spurling's maneuver causes no neck pain to either side.  Myofascial exam: No Tenderness to palpation across cervical paraspinous region bilaterally.    LUMBAR SPINE  Lumbar spine: ROM is full with flexion extension and oblique extension with moderate increased pain.    Darrel's test causes no increased pain on either side.    Supine straight leg raise is positive on the right at 45°  Internal and external rotation of the hip causes no increased pain on either side.  Myofascial exam: No tenderness to palpation across lumbar paraspinous muscles.      MENTAL STATUS: normal orientation, speech, language, and fund of knowledge for social situation.  Emotional state appropriate.    CRANIAL NERVES:  II:  PERRL bilaterally,   III,IV,VI: EOMI.    V:  Facial sensation equal bilaterally  VII:  Facial motor function normal.  VIII:  Hearing equal to finger rub bilaterally  IX/X: Gag normal, palate symmetric  XI:  Shoulder shrug equal, head turn equal  XII:  Tongue midline without fasciculations      MOTOR: Tone and bulk: normal bilateral upper and lower Strength: normal   Delt Bi Tri WE WF     R 5 5 5 5 5 5   L 5 5 5 5 5 5     IP ADD ABD Quad TA Gas HAM  R 5 5 5 5 5 5 5  L 5 5 5 5 5 5 5    SENSATION: Light touch and pinprick intact bilaterally  REFLEXES: normal, symmetric, nonbrisk.  Toes down, no clonus. No  radha.  GAIT:  Uses cane for assistance       Imaging:  MRI lumbar spine 01/2016 (outside records)  Lumbar spondylosis with small posterior disc protrusion L4-5 and L5-S1 with moderate bilateral neuroforaminal narrowing at L4-5.  Has postoperative changes of a laminotomy at L4-5 noted.    MRI cervical spine 05/2015  Postoperative changes of total disc arthroplasty at C4-5 anterior fusion with interbody spaces at C5-6 and C6-7.    Assessment:  Marshall Barboza is a 48 y.o. male with neck and lower back pain  1. DDD (degenerative disc disease), lumbar    2. Postlaminectomy syndrome of lumbar region    3. Lumbar radiculitis      Plan:  1. I have stressed the importance of physical activity and exercise to improve overall health  2. Schedule repeat lumbar epidural steroid injection to the left L4-5 and L5-S1 level(s).  I have explained the risks, benefits, and alternatives of the procedure in detail. The patient voices understanding and all questions have been answered. The patient agrees to proceed as planned. Written Consent obtained.   3. May consider cervical DEMETRIA if neck pain does not improve as well  4. He continue tramadol 50 mg q.day as needed.  He takes this very sparingly.  5. Continue Aquatic therapy  6. Follow-up 3 months or sooner

## 2019-02-26 DIAGNOSIS — M54.16 LUMBAR RADICULOPATHY: Primary | ICD-10-CM

## 2019-03-12 ENCOUNTER — HOSPITAL ENCOUNTER (OUTPATIENT)
Facility: AMBULARY SURGERY CENTER | Age: 49
Discharge: HOME | End: 2019-03-12
Attending: ANESTHESIOLOGY | Admitting: ANESTHESIOLOGY
Payer: OTHER GOVERNMENT

## 2019-03-12 DIAGNOSIS — M54.16 LUMBAR RADICULITIS: Primary | ICD-10-CM

## 2019-03-12 PROCEDURE — 64483 PR EPIDURAL INJ, ANES/STEROID, TRANSFORAMINAL, LUMB/SACR, SNGL LEVL: ICD-10-PCS | Mod: 50,,, | Performed by: ANESTHESIOLOGY

## 2019-03-12 PROCEDURE — 64483 NJX AA&/STRD TFRM EPI L/S 1: CPT | Mod: 50,,, | Performed by: ANESTHESIOLOGY

## 2019-03-12 PROCEDURE — 64483 NJX AA&/STRD TFRM EPI L/S 1: CPT | Mod: RT | Performed by: ANESTHESIOLOGY

## 2019-03-12 RX ORDER — DEXAMETHASONE SODIUM PHOSPHATE 10 MG/ML
INJECTION INTRAMUSCULAR; INTRAVENOUS
Status: DISCONTINUED
Start: 2019-03-12 | End: 2019-03-12 | Stop reason: HOSPADM

## 2019-03-12 RX ORDER — LIDOCAINE HYDROCHLORIDE 20 MG/ML
JELLY TOPICAL
Status: DISCONTINUED
Start: 2019-03-12 | End: 2019-03-12 | Stop reason: HOSPADM

## 2019-03-12 RX ORDER — BUPIVACAINE HYDROCHLORIDE 2.5 MG/ML
INJECTION, SOLUTION EPIDURAL; INFILTRATION; INTRACAUDAL
Status: DISCONTINUED | OUTPATIENT
Start: 2019-03-12 | End: 2019-03-12 | Stop reason: HOSPADM

## 2019-03-12 RX ORDER — SODIUM CHLORIDE, SODIUM LACTATE, POTASSIUM CHLORIDE, CALCIUM CHLORIDE 600; 310; 30; 20 MG/100ML; MG/100ML; MG/100ML; MG/100ML
INJECTION, SOLUTION INTRAVENOUS ONCE AS NEEDED
Status: ACTIVE | OUTPATIENT
Start: 2019-03-12 | End: 2030-08-08

## 2019-03-12 RX ORDER — DEXAMETHASONE SODIUM PHOSPHATE 10 MG/ML
INJECTION INTRAMUSCULAR; INTRAVENOUS
Status: DISCONTINUED | OUTPATIENT
Start: 2019-03-12 | End: 2019-03-12 | Stop reason: HOSPADM

## 2019-03-12 RX ORDER — LIDOCAINE HYDROCHLORIDE 10 MG/ML
INJECTION, SOLUTION EPIDURAL; INFILTRATION; INTRACAUDAL; PERINEURAL
Status: DISCONTINUED | OUTPATIENT
Start: 2019-03-12 | End: 2019-03-12 | Stop reason: HOSPADM

## 2019-03-12 NOTE — DISCHARGE INSTRUCTIONS
Recovery After Procedural Sedation (Adult)  You have been given medicine by vein to make you sleep during your surgery. This may have included both a pain medicine and sleeping medicine. Most of the effects have worn off. But you may still have some drowsiness for the next 6 to 8 hours.  Home care  Follow these guidelines when you get home:  · For the next 8 hours, you should be watched by a responsible adult. This person should make sure your condition is not getting worse.  · Don't drink any alcohol for the next 24 hours.  · Don't drive, operate dangerous machinery, or make important business or personal decisions during the next 24 hours.  Note: Your healthcare provider may tell you not to take any medicine by mouth for pain or sleep in the next 4 hours. These medicines may react with the medicines you were given in the hospital. This could cause a much stronger response than usual.  Follow-up care  Follow up with your healthcare provider if you are not alert and back to your usual level of activity within 12 hours.  When to seek medical advice  Call your healthcare provider right away if any of these occur:  · Drowsiness gets worse  · Weakness or dizziness gets worse  · Repeated vomiting  · You can't be awakened   Date Last Reviewed: 10/18/2016  © 5338-5762 [employer]. [address] [place] [address], [place] [address]. All rights reserved. This information is not intended as a substitute for professional medical care. Always follow your healthcare professional's instructions.      Pain injection instructions:     This procedure may take a couple weeks to relieve pain  You may get some pain relief from the local anesthetic initally.    No driving for 24 hrs.   Activity as tolerated- gradually increase activities.  Dont lift over 10 lbs for 24 hrs   No heat at injection sites x 2 days. No heating pads, hot tubs, saunas, or swimming in any body of water or pool for 2 days.  Use ice pack for mild swelling  and for comfort , apply for 20 minutes, remove for 20 minute intervals. No direct contact of ice itself  to skin.  May shower today. No tub baths for two days.      Resume Aspirin, Plavix, or Coumadin the day after the procedure unless otherwise instructed.   If diabetic,monitor your glucose carefully as steroids can increase your glucose level    Seek immediate medical help for:   Severe increase in your usual pain or appearance of new pain.  Prolonged (mor than 8 hours) or increasing weakness or numbness in the legs or arms. Numbing medicine was injected and can affect the messages to and from the brain and legs or arms.  .    Fever above 101 ,Drainage,redness,active bleeding, or increased swelling at the injection site.  Headache, shortness of breath, chest pain, or breathing problems.

## 2019-03-12 NOTE — DISCHARGE SUMMARY
[facility]  Discharge Note  Short Stay    Admit Date: 3/12/2019    Discharge Date and Time: 3/12/2019    Attending Physician: Fidencio Vazquez MD     Discharge Provider: Fidencio Vazquez    Diagnoses:  Active Hospital Problems    Diagnosis  POA    *Lumbar radiculitis [M54.16]  Yes      Resolved Hospital Problems   No resolved problems to display.       Hospital Course: Lumbar DEMETRIA  Discharged Condition: Good    Final Diagnoses:   Active Hospital Problems    Diagnosis  POA    *Lumbar radiculitis [M54.16]  Yes      Resolved Hospital Problems   No resolved problems to display.       Disposition: Home or Self Care    Follow up/Patient Instructions:    Medications:  Reconciled Home Medications:      Medication List      CONTINUE taking these medications    acyclovir 800 MG Tab  Commonly known as:  ZOVIRAX     amLODIPine 10 MG tablet  Commonly known as:  NORVASC     hydroCHLOROthiazide 25 MG tablet  Commonly known as:  HYDRODIURIL  Take 25 mg by mouth once daily.     ibuprofen 800 MG tablet  Commonly known as:  ADVIL,MOTRIN  Take 800 mg by mouth 3 (three) times daily.     meloxicam 15 MG tablet  Commonly known as:  MOBIC     methocarbamol 500 MG Tab  Commonly known as:  ROBAXIN     topiramate 50 MG tablet  Commonly known as:  TOPAMAX          Discharge Procedure Orders   Call MD for:  temperature >100.4     Call MD for:  persistent nausea and vomiting or diarrhea     Call MD for:  severe uncontrolled pain     Call MD for:  redness, tenderness, or signs of infection (pain, swelling, redness, odor or green/yellow discharge around incision site)     Call MD for:  difficulty breathing or increased cough     Call MD for:  severe persistent headache        Follow up with MD in 2-3 weeks    Discharge Procedure Orders (must include Diet, Follow-up, Activity):   Discharge Procedure Orders (must include Diet, Follow-up, Activity)   Call MD for:  temperature >100.4     Call MD for:  persistent nausea and vomiting or diarrhea      Call MD for:  severe uncontrolled pain     Call MD for:  redness, tenderness, or signs of infection (pain, swelling, redness, odor or green/yellow discharge around incision site)     Call MD for:  difficulty breathing or increased cough     Call MD for:  severe persistent headache

## 2019-03-12 NOTE — OP NOTE
PROCEDURE DATE: 3/12/2019    PROCEDURE: Bilateral L4-5 transforaminal epidural steroid injection under fluoroscopy    DIAGNOSIS: Lumbar disc displacement without myelopathy  Post op diagnosis: Same    PHYSICIAN: Fidencio Vazquez MD    MEDICATIONS INJECTED:  Dexamethasone 5mg (0.5ml) and 1.5ml 0.25% bupivicaine at each nerve root.     LOCAL ANESTHETIC INJECTED:  Lidocaine 1%. 2 ml per site.    SEDATION MEDICATIONS: None    ESTIMATED BLOOD LOSS:  None    COMPLICATIONS:  None    TECHNIQUE:   A time-out was taken to identify patient and procedure side prior to starting the procedure. The patient was placed in a prone position, prepped and draped in the usual sterile fashion using ChloraPrep and sterile towels.  The area to be injected was determined under fluoroscopic guidance in AP and oblique view.  Local anesthetic was given by raising a wheal and going down to the hub of a 25-gauge 1.5 inch needle.  In oblique view, a 3.5 inch 22-gauge bent-tip spinal needle was introduced towards 6 oclock position of the pedicle of each above named nerve root level.  The needle was walked medially then hinged into the neural foramen and position was confirmed in AP and lateral views.  1ml contrast dye was injected to confirm appropriate placement and that there was no vascular uptake.  After negative aspiration for blood or CSF, the medication was then injected. This was performed at the bilateral L4-5 level(s). The patient tolerated the procedure well.    The patient was monitored after the procedure.  Patient was given post procedure and discharge instructions to follow at home. The patient was discharged in a stable condition.

## 2019-03-15 VITALS
HEIGHT: 69 IN | TEMPERATURE: 99 F | RESPIRATION RATE: 18 BRPM | BODY MASS INDEX: 26.66 KG/M2 | WEIGHT: 180 LBS | OXYGEN SATURATION: 98 % | DIASTOLIC BLOOD PRESSURE: 90 MMHG | HEART RATE: 63 BPM | SYSTOLIC BLOOD PRESSURE: 139 MMHG

## 2019-04-30 ENCOUNTER — OFFICE VISIT (OUTPATIENT)
Dept: PAIN MEDICINE | Facility: CLINIC | Age: 49
End: 2019-04-30
Payer: OTHER GOVERNMENT

## 2019-04-30 VITALS
DIASTOLIC BLOOD PRESSURE: 88 MMHG | HEART RATE: 72 BPM | SYSTOLIC BLOOD PRESSURE: 143 MMHG | HEIGHT: 69 IN | BODY MASS INDEX: 26.66 KG/M2 | WEIGHT: 180 LBS

## 2019-04-30 DIAGNOSIS — M51.36 DDD (DEGENERATIVE DISC DISEASE), LUMBAR: ICD-10-CM

## 2019-04-30 DIAGNOSIS — M54.16 LUMBAR RADICULOPATHY: Primary | ICD-10-CM

## 2019-04-30 DIAGNOSIS — M96.1 POSTLAMINECTOMY SYNDROME OF LUMBAR REGION: ICD-10-CM

## 2019-04-30 PROCEDURE — 99214 PR OFFICE/OUTPT VISIT, EST, LEVL IV, 30-39 MIN: ICD-10-PCS | Mod: S$PBB,,, | Performed by: PHYSICIAN ASSISTANT

## 2019-04-30 PROCEDURE — 99999 PR PBB SHADOW E&M-EST. PATIENT-LVL III: CPT | Mod: PBBFAC,,, | Performed by: PHYSICIAN ASSISTANT

## 2019-04-30 PROCEDURE — 99213 OFFICE O/P EST LOW 20 MIN: CPT | Mod: PBBFAC,PN | Performed by: PHYSICIAN ASSISTANT

## 2019-04-30 PROCEDURE — 99214 OFFICE O/P EST MOD 30 MIN: CPT | Mod: S$PBB,,, | Performed by: PHYSICIAN ASSISTANT

## 2019-04-30 PROCEDURE — 99999 PR PBB SHADOW E&M-EST. PATIENT-LVL III: ICD-10-PCS | Mod: PBBFAC,,, | Performed by: PHYSICIAN ASSISTANT

## 2019-04-30 RX ORDER — TRAMADOL HYDROCHLORIDE 50 MG/1
50 TABLET ORAL EVERY 12 HOURS PRN
Qty: 30 TABLET | Refills: 2 | Status: SHIPPED | OUTPATIENT
Start: 2019-04-30 | End: 2019-05-30

## 2019-04-30 NOTE — PROGRESS NOTES
Referring Physician: No ref. provider found    PCP: Sara Quan NP      CC:  Neck and lower back pain    Interval History:  Marshall Barboza is a 49 y.o. male with chronic neck and low back pain who presents today for f/u s/p repeat L4-5 TF DEMETRIA. Reports 40-50% relief.   He underwent a repeat lumbar DEMETRIA on January 25, 2019 with moderate benefit of his low back and leg pain.   He underwent a series of 2 injections back in August and September 2018 which provided moderate benefit for several months.  Denies worsening LE weakness. Denies b/b changes. He take Tramadol sparingly.   Pain today is rated 5/10.    Prior HPI:   Marshall Barboza is a 49 y.o. male referred to us for neck and lower back pain.  Patient is a former active  personnel.  He has significant history of cervical and lumbar spine surgery.  He underwent L4-5 laminotomy and diskectomy in February 2015.  Radicular pain improved but he continues to have lower back pain.  He also was found to have cervical DDD.  He underwent a C4-C7 ACDF.  Lower back and right leg pain is more bothersome currently.  He has constant aching, throbbing pain in his lower back.  Pain radiates to down his right buttock into his right calf.  Pain occasionally travels down the left calf.  He has had lumbar MB RFA procedures in the past at the  with short-lived relief.  He has undergone lumbar ESIwith moderate benefit for 3 months.  He currently takes tramadol very sparingly with mild-to-moderate benefit.  He denies any worsening weakness.  No bowel bladder changes.    ROS:  CONSTITUTIONAL: No fevers, chills, night sweats, wt. loss, appetite changes  SKIN: no rashes or itching  ENT: No headaches, head trauma, vision changes, or eye pain  LYMPH NODES: None noticed   CV: No chest pain, palpitations.   RESP: No shortness of breath, dyspnea on exertion, cough, wheezing, or hemoptysis  GI: No nausea, emesis, diarrhea, constipation, melena, hematochezia, pain.    : No  dysuria, hematuria, urgency, or frequency   HEME: No easy bruising, bleeding problems  PSYCHIATRIC: No depression, anxiety, psychosis, hallucinations.  NEURO: No seizures, memory loss, dizziness or difficulty sleeping  MSK:  Positive per HPI    Past Medical History:   Diagnosis Date    Allergy     Arthritis     Depression     Hypertension     Lumbar radiculitis 8/2/2018     Past Surgical History:   Procedure Laterality Date    BREAST SURGERY      FRACTURE SURGERY      HERNIA REPAIR      Injection,steroid,epidural,transforaminal approach Bilateral 9/14/2018    Performed by Fidencio Vazquez [facility] at [facility]    Injection,steroid,epidural,transforaminal approach Bilateral 8/2/2018    Performed by [facility] at [facility]    Injection,steroid,epidural,transforaminal approach L4-5 Bilateral 3/12/2019    Performed by [facility] at [facility]    Injection,steroid,epidural,transforaminal approach L4-5 Bilateral 1/25/2019    Performed by [facility]quez [facility] at [facility]    SPINE SURGERY       History reviewed. No pertinent family history.  Social History     Socioeconomic History    Marital status:      Spouse name: Not on file    Number of children: Not on file    Years of education: Not on file    Highest education level: Not on file   Occupational History    Not on file   Social Needs    Financial resource strain: Not on file    Food insecurity:     Worry: Not on file     Inability: Not on file    Transportation needs:     Medical: Not on file     Non-medical: Not on file   Tobacco Use    Smoking status: Not on file    Smokeless tobacco: Never Used   Substance and Sexual Activity    Alcohol use: Yes     Alcohol/week: 0.6 oz     Types: 1 Cans of beer per week    Drug use: No    Sexual activity: Yes   Lifestyle    Physical activity:     Days per week: Not on file     Minutes per session: Not on file    Stress: Not on file   Relationships    Social connections:     Talks on phone: Not on file     Gets  "together: Not on file     Attends Yarsanism service: Not on file     Active member of club or organization: Not on file     Attends meetings of clubs or organizations: Not on file     Relationship status: Not on file   Other Topics Concern    Not on file   Social History Narrative    Not on file       Medications/Allergies: See med card    Vitals:    04/30/19 0913   BP: (!) 143/88   Pulse: 72   Weight: 81.6 kg (180 lb)   Height: 5' 9" (1.753 m)   PainSc:   5   PainLoc: Back       Physical exam:    GENERAL: A and O x3, the patient appears well groomed and is in no acute distress.  Skin: No rashes or obvious lesions  HEENT: normocephalic, atraumatic  CARDIOVASCULAR:  RRR  LUNGS: non labored breathing  ABDOMEN: soft, nontender   UPPER EXTREMITIES: Normal alignment, normal range of motion, no atrophy, no skin changes,  hair growth and nail growth normal and equal bilaterally. No swelling, no tenderness.    LOWER EXTREMITIES:  Normal alignment, normal range of motion, no atrophy, no skin changes,  hair growth and nail growth normal and equal bilaterally. No swelling, no tenderness.  CERVICAL SPINE:  Cervical spine: ROM is limited in flexion, extension and lateral rotation with moderate increased pain.  Spurling's maneuver causes no neck pain to either side.  Myofascial exam: No Tenderness to palpation across cervical paraspinous region bilaterally.    LUMBAR SPINE  Lumbar spine: ROM is full with flexion extension and oblique extension with moderate increased pain.    Darrel's test causes no increased pain on either side.    Supine straight leg raise is positive on the right at 45°  Internal and external rotation of the hip causes no increased pain on either side.  Myofascial exam: No tenderness to palpation across lumbar paraspinous muscles.      MENTAL STATUS: normal orientation, speech, language, and fund of knowledge for social situation.  Emotional state appropriate.    CRANIAL NERVES:  II:  PERRL bilaterally, "   III,IV,VI: EOMI.    V:  Facial sensation equal bilaterally  VII:  Facial motor function normal.  VIII:  Hearing equal to finger rub bilaterally  IX/X: Gag normal, palate symmetric  XI:  Shoulder shrug equal, head turn equal  XII:  Tongue midline without fasciculations      MOTOR: Tone and bulk: normal bilateral upper and lower Strength: normal   Delt Bi Tri WE WF     R 5 5 5 5 5 5   L 5 5 5 5 5 5     IP ADD ABD Quad TA Gas HAM  R 5 5 5 5 5 5 5  L 5 5 5 5 5 5 5    SENSATION: Light touch and pinprick intact bilaterally  REFLEXES: normal, symmetric, nonbrisk.  Toes down, no clonus. No hoffmans.  GAIT:  Uses cane for assistance     Imaging:  MRI lumbar spine 01/2016 (outside records)  Lumbar spondylosis with small posterior disc protrusion L4-5 and L5-S1 with moderate bilateral neuroforaminal narrowing at L4-5.  Has postoperative changes of a laminotomy at L4-5 noted.    MRI cervical spine 05/2015  Postoperative changes of total disc arthroplasty at C4-5 anterior fusion with interbody spaces at C5-6 and C6-7.    Assessment:  Marshall Barboza is a 49 y.o. male with neck and lower back pain  1. Lumbar radiculopathy    2. DDD (degenerative disc disease), lumbar    3. Postlaminectomy syndrome of lumbar region         Plan:  1. I have stressed the importance of physical activity and exercise to improve overall health  2. Monitor progress and consdier repeat lumbar epidural steroid injection to the left L4-5 and L5-S1 level(s) in August at the soonest  3. May consider cervical DEMETRIA if neck pain does not improve in the future  4. He continue tramadol 50 mg q.day as needed.  He takes this very sparingly.  5. Continue Aquatic therapy  6. Follow-up 3 months or sooner  All medication management was performed by Dr. Fidencio Vazquez

## 2019-07-23 ENCOUNTER — OFFICE VISIT (OUTPATIENT)
Dept: PAIN MEDICINE | Facility: CLINIC | Age: 49
End: 2019-07-23
Payer: OTHER GOVERNMENT

## 2019-07-23 VITALS
WEIGHT: 180 LBS | DIASTOLIC BLOOD PRESSURE: 88 MMHG | SYSTOLIC BLOOD PRESSURE: 147 MMHG | BODY MASS INDEX: 26.58 KG/M2 | HEART RATE: 60 BPM

## 2019-07-23 DIAGNOSIS — M54.16 LUMBAR RADICULOPATHY: ICD-10-CM

## 2019-07-23 DIAGNOSIS — M96.1 POSTLAMINECTOMY SYNDROME OF LUMBAR REGION: Primary | ICD-10-CM

## 2019-07-23 DIAGNOSIS — M51.36 DDD (DEGENERATIVE DISC DISEASE), LUMBAR: ICD-10-CM

## 2019-07-23 PROCEDURE — 99214 PR OFFICE/OUTPT VISIT, EST, LEVL IV, 30-39 MIN: ICD-10-PCS | Mod: S$PBB,,, | Performed by: PHYSICIAN ASSISTANT

## 2019-07-23 PROCEDURE — 99999 PR PBB SHADOW E&M-EST. PATIENT-LVL III: ICD-10-PCS | Mod: PBBFAC,,, | Performed by: PHYSICIAN ASSISTANT

## 2019-07-23 PROCEDURE — 99214 OFFICE O/P EST MOD 30 MIN: CPT | Mod: S$PBB,,, | Performed by: PHYSICIAN ASSISTANT

## 2019-07-23 PROCEDURE — 99999 PR PBB SHADOW E&M-EST. PATIENT-LVL III: CPT | Mod: PBBFAC,,, | Performed by: PHYSICIAN ASSISTANT

## 2019-07-23 PROCEDURE — 99213 OFFICE O/P EST LOW 20 MIN: CPT | Mod: PBBFAC,PN | Performed by: PHYSICIAN ASSISTANT

## 2019-07-23 RX ORDER — FLUTICASONE PROPIONATE 50 MCG
SPRAY, SUSPENSION (ML) NASAL
COMMUNITY
Start: 2019-06-08

## 2019-07-23 RX ORDER — TRAMADOL HYDROCHLORIDE 50 MG/1
50 TABLET ORAL
Qty: 30 TABLET | Refills: 2 | Status: SHIPPED | OUTPATIENT
Start: 2019-07-23 | End: 2019-08-22

## 2019-07-23 RX ORDER — TRAMADOL HYDROCHLORIDE 50 MG/1
TABLET ORAL
Refills: 2 | COMMUNITY
Start: 2019-07-18 | End: 2019-10-21

## 2019-07-23 NOTE — PROGRESS NOTES
Referring Physician: No ref. provider found    PCP: Sara Quan NP      CC:  Neck and lower back pain    Interval History:  Marshall Barboza is a 49 y.o. male with chronic neck and low back pain who presents today for f/u and medication refill.  Repeat L4-5 TF DEMETRIA in March  provided 40-50% relief.  Pain has returned to baseline.  He underwent a repeat lumbar DEMETRIA on January 25, 2019 with moderate benefit of his low back and leg pain.   He underwent a series of 2 injections back in August and September 2018 which provided moderate benefit for several months.  Denies worsening LE weakness. Denies b/b changes. He take Tramadol sparingly.   Pain today is rated 5/10.    Prior HPI:   Marshall Barboza is a 49 y.o. male referred to us for neck and lower back pain.  Patient is a former active  personnel.  He has significant history of cervical and lumbar spine surgery.  He underwent L4-5 laminotomy and diskectomy in February 2015.  Radicular pain improved but he continues to have lower back pain.  He also was found to have cervical DDD.  He underwent a C4-C7 ACDF.  Lower back and right leg pain is more bothersome currently.  He has constant aching, throbbing pain in his lower back.  Pain radiates to down his right buttock into his right calf.  Pain occasionally travels down the left calf.  He has had lumbar MB RFA procedures in the past at the  with short-lived relief.  He has undergone lumbar ESIwith moderate benefit for 3 months.  He currently takes tramadol very sparingly with mild-to-moderate benefit.  He denies any worsening weakness.  No bowel bladder changes.    ROS:  CONSTITUTIONAL: No fevers, chills, night sweats, wt. loss, appetite changes  SKIN: no rashes or itching  ENT: No headaches, head trauma, vision changes, or eye pain  LYMPH NODES: None noticed   CV: No chest pain, palpitations.   RESP: No shortness of breath, dyspnea on exertion, cough, wheezing, or hemoptysis  GI: No nausea, emesis,  diarrhea, constipation, melena, hematochezia, pain.    : No dysuria, hematuria, urgency, or frequency   HEME: No easy bruising, bleeding problems  PSYCHIATRIC: No depression, anxiety, psychosis, hallucinations.  NEURO: No seizures, memory loss, dizziness or difficulty sleeping  MSK:  Positive per HPI    Past Medical History:   Diagnosis Date    Allergy     Arthritis     Depression     Hypertension     Lumbar radiculitis 8/2/2018     Past Surgical History:   Procedure Laterality Date    BREAST SURGERY      FRACTURE SURGERY      HERNIA REPAIR      Injection,steroid,epidural,transforaminal approach Bilateral 9/14/2018    [facility]    Injection,steroid,epidural,transforaminal approach Bilateral 8/2/2018    [facility]    Injection,steroid,epidural,transforaminal approach L4-5 Bilateral 3/12/2019    [facility]    Injection,steroid,epidural,transforaminal approach L4-5 Bilateral 1/25/2019    [facility]    SPINE SURGERY       History reviewed. No pertinent family history.  Social History     Socioeconomic History    Marital status:      Spouse name: Not on file    Number of children: Not on file    Years of education: Not on file    Highest education level: Not on file   Occupational History    Not on file   Social Needs    Financial resource strain: Not on file    Food insecurity:     Worry: Not on file     Inability: Not on file    Transportation needs:     Medical: Not on file     Non-medical: Not on file   Tobacco Use    Smoking status: Not on file    Smokeless tobacco: Never Used   Substance and Sexual Activity    Alcohol use: Yes     Alcohol/week: 0.6 oz     Types: 1 Cans of beer per week    Drug use: No    Sexual activity: Yes   Lifestyle    Physical activity:     Days per week: Not on file     Minutes per session: Not on file    Stress: Not on file   Relationships     Social connections:     Talks on phone: Not on file     Gets together: Not on file     Attends Voodoo service: Not on file     Active member of club or organization: Not on file     Attends meetings of clubs or organizations: Not on file     Relationship status: Not on file   Other Topics Concern    Not on file   Social History Narrative    Not on file       Medications/Allergies: See med card    Vitals:    07/23/19 0851   BP: (!) 147/88   Pulse: 60   Weight: 81.6 kg (180 lb)   PainSc:   5   PainLoc: Back       Physical exam:    GENERAL: A and O x3, the patient appears well groomed and is in no acute distress.  Skin: No rashes or obvious lesions  HEENT: normocephalic, atraumatic  CARDIOVASCULAR:  RRR  LUNGS: non labored breathing  ABDOMEN: soft, nontender   UPPER EXTREMITIES: Normal alignment, normal range of motion, no atrophy, no skin changes,  hair growth and nail growth normal and equal bilaterally. No swelling, no tenderness.    LOWER EXTREMITIES:  Normal alignment, normal range of motion, no atrophy, no skin changes,  hair growth and nail growth normal and equal bilaterally. No swelling, no tenderness.  CERVICAL SPINE:  Cervical spine: ROM is limited in flexion, extension and lateral rotation with moderate increased pain.  Spurling's maneuver causes no neck pain to either side.  Myofascial exam: No Tenderness to palpation across cervical paraspinous region bilaterally.    LUMBAR SPINE  Lumbar spine: ROM is full with flexion extension and oblique extension with moderate increased pain.    Darrel's test causes no increased pain on either side.    Supine straight leg raise is positive on the right at 45°  Internal and external rotation of the hip causes no increased pain on either side.  Myofascial exam: No tenderness to palpation across lumbar paraspinous muscles.      MENTAL STATUS: normal orientation, speech, language, and fund of knowledge for social situation.  Emotional state appropriate.    CRANIAL  NERVES:  II:  PERRL bilaterally,   III,IV,VI: EOMI.    V:  Facial sensation equal bilaterally  VII:  Facial motor function normal.  VIII:  Hearing equal to finger rub bilaterally  IX/X: Gag normal, palate symmetric  XI:  Shoulder shrug equal, head turn equal  XII:  Tongue midline without fasciculations      MOTOR: Tone and bulk: normal bilateral upper and lower Strength: normal   Delt Bi Tri WE WF     R 5 5 5 5 5 5   L 5 5 5 5 5 5     IP ADD ABD Quad TA Gas HAM  R 5 5 5 5 5 5 5  L 5 5 5 5 5 5 5    SENSATION: Light touch and pinprick intact bilaterally  REFLEXES: normal, symmetric, nonbrisk.  Toes down, no clonus. No hoffmans.  GAIT:  Uses cane for assistance     Imaging:  MRI lumbar spine 01/2016 (outside records)  Lumbar spondylosis with small posterior disc protrusion L4-5 and L5-S1 with moderate bilateral neuroforaminal narrowing at L4-5.  Has postoperative changes of a laminotomy at L4-5 noted.    MRI cervical spine 05/2015  Postoperative changes of total disc arthroplasty at C4-5 anterior fusion with interbody spaces at C5-6 and C6-7.    Assessment:  Marshall Barboza is a 49 y.o. male with neck and lower back pain  1. Postlaminectomy syndrome of lumbar region    2. Lumbar radiculopathy    3. DDD (degenerative disc disease), lumbar         Plan:  1. I have stressed the importance of physical activity and exercise to improve overall health  2. Schedule repeat lumbar epidural steroid injection bilaterally at the L4-5 and L5-S1 levels. I have explained the risks, benefits, and alternatives of the procedure in detail. The patient voices understanding and all questions have been answered. The patient agrees to proceed as planned. Written Consent obtained.   3. May consider cervical DEMETRIA if neck pain does not improve in the future  4. He continue tramadol 50 mg q day as needed.  He takes this very sparingly.  5. Follow-up 3 months or sooner  All medication management was performed by Dr. Fidencio Vazquez

## 2019-07-23 NOTE — H&P (VIEW-ONLY)
Referring Physician: No ref. provider found    PCP: Sara Quan NP      CC:  Neck and lower back pain    Interval History:  Marshall Barboza is a 49 y.o. male with chronic neck and low back pain who presents today for f/u and medication refill.  Repeat L4-5 TF DEMETRIA in March  provided 40-50% relief.  Pain has returned to baseline.  He underwent a repeat lumbar DEMETRIA on January 25, 2019 with moderate benefit of his low back and leg pain.   He underwent a series of 2 injections back in August and September 2018 which provided moderate benefit for several months.  Denies worsening LE weakness. Denies b/b changes. He take Tramadol sparingly.   Pain today is rated 5/10.    Prior HPI:   Marshall Barboza is a 49 y.o. male referred to us for neck and lower back pain.  Patient is a former active  personnel.  He has significant history of cervical and lumbar spine surgery.  He underwent L4-5 laminotomy and diskectomy in February 2015.  Radicular pain improved but he continues to have lower back pain.  He also was found to have cervical DDD.  He underwent a C4-C7 ACDF.  Lower back and right leg pain is more bothersome currently.  He has constant aching, throbbing pain in his lower back.  Pain radiates to down his right buttock into his right calf.  Pain occasionally travels down the left calf.  He has had lumbar MB RFA procedures in the past at the  with short-lived relief.  He has undergone lumbar ESIwith moderate benefit for 3 months.  He currently takes tramadol very sparingly with mild-to-moderate benefit.  He denies any worsening weakness.  No bowel bladder changes.    ROS:  CONSTITUTIONAL: No fevers, chills, night sweats, wt. loss, appetite changes  SKIN: no rashes or itching  ENT: No headaches, head trauma, vision changes, or eye pain  LYMPH NODES: None noticed   CV: No chest pain, palpitations.   RESP: No shortness of breath, dyspnea on exertion, cough, wheezing, or hemoptysis  GI: No nausea, emesis,  diarrhea, constipation, melena, hematochezia, pain.    : No dysuria, hematuria, urgency, or frequency   HEME: No easy bruising, bleeding problems  PSYCHIATRIC: No depression, anxiety, psychosis, hallucinations.  NEURO: No seizures, memory loss, dizziness or difficulty sleeping  MSK:  Positive per HPI    Past Medical History:   Diagnosis Date    Allergy     Arthritis     Depression     Hypertension     Lumbar radiculitis 8/2/2018     Past Surgical History:   Procedure Laterality Date    BREAST SURGERY      FRACTURE SURGERY      HERNIA REPAIR      Injection,steroid,epidural,transforaminal approach Bilateral 9/14/2018    Performed [facility]    Injection,steroid,epidural,transforaminal approach Bilateral 8/2/2018    [facility]    Injection,steroid,epidural,transforaminal approach L4-5 Bilateral 3/12/2019    Performed by [facility]qez [facility] at [facility]    Injection,steroid,epidural,transforaminal approach L4-5 Bilateral 1/25/2019    Performed by [facility] at [facility]    SPINE SURGERY       History reviewed. No pertinent family history.  Social History     Socioeconomic History    Marital status:      Spouse name: Not on file    Number of children: Not on file    Years of education: Not on file    Highest education level: Not on file   Occupational History    Not on file   Social Needs    Financial resource strain: Not on file    Food insecurity:     Worry: Not on file     Inability: Not on file    Transportation needs:     Medical: Not on file     Non-medical: Not on file   Tobacco Use    Smoking status: Not on file    Smokeless tobacco: Never Used   Substance and Sexual Activity    Alcohol use: Yes     Alcohol/week: 0.6 oz     Types: 1 Cans of beer per week    Drug use: No    Sexual activity: Yes   Lifestyle    Physical activity:     Days per week: Not on file     Minutes per session: Not on file    Stress: Not on file   Relationships     Social connections:     Talks on phone: Not on file     Gets together: Not on file     Attends Jehovah's witness service: Not on file     Active member of club or organization: Not on file     Attends meetings of clubs or organizations: Not on file     Relationship status: Not on file   Other Topics Concern    Not on file   Social History Narrative    Not on file       Medications/Allergies: See med card    Vitals:    07/23/19 0851   BP: (!) 147/88   Pulse: 60   Weight: 81.6 kg (180 lb)   PainSc:   5   PainLoc: Back       Physical exam:    GENERAL: A and O x3, the patient appears well groomed and is in no acute distress.  Skin: No rashes or obvious lesions  HEENT: normocephalic, atraumatic  CARDIOVASCULAR:  RRR  LUNGS: non labored breathing  ABDOMEN: soft, nontender   UPPER EXTREMITIES: Normal alignment, normal range of motion, no atrophy, no skin changes,  hair growth and nail growth normal and equal bilaterally. No swelling, no tenderness.    LOWER EXTREMITIES:  Normal alignment, normal range of motion, no atrophy, no skin changes,  hair growth and nail growth normal and equal bilaterally. No swelling, no tenderness.  CERVICAL SPINE:  Cervical spine: ROM is limited in flexion, extension and lateral rotation with moderate increased pain.  Spurling's maneuver causes no neck pain to either side.  Myofascial exam: No Tenderness to palpation across cervical paraspinous region bilaterally.    LUMBAR SPINE  Lumbar spine: ROM is full with flexion extension and oblique extension with moderate increased pain.    Darrel's test causes no increased pain on either side.    Supine straight leg raise is positive on the right at 45°  Internal and external rotation of the hip causes no increased pain on either side.  Myofascial exam: No tenderness to palpation across lumbar paraspinous muscles.      MENTAL STATUS: normal orientation, speech, language, and fund of knowledge for social situation.  Emotional state appropriate.    CRANIAL  NERVES:  II:  PERRL bilaterally,   III,IV,VI: EOMI.    V:  Facial sensation equal bilaterally  VII:  Facial motor function normal.  VIII:  Hearing equal to finger rub bilaterally  IX/X: Gag normal, palate symmetric  XI:  Shoulder shrug equal, head turn equal  XII:  Tongue midline without fasciculations      MOTOR: Tone and bulk: normal bilateral upper and lower Strength: normal   Delt Bi Tri WE WF     R 5 5 5 5 5 5   L 5 5 5 5 5 5     IP ADD ABD Quad TA Gas HAM  R 5 5 5 5 5 5 5  L 5 5 5 5 5 5 5    SENSATION: Light touch and pinprick intact bilaterally  REFLEXES: normal, symmetric, nonbrisk.  Toes down, no clonus. No hoffmans.  GAIT:  Uses cane for assistance     Imaging:  MRI lumbar spine 01/2016 (outside records)  Lumbar spondylosis with small posterior disc protrusion L4-5 and L5-S1 with moderate bilateral neuroforaminal narrowing at L4-5.  Has postoperative changes of a laminotomy at L4-5 noted.    MRI cervical spine 05/2015  Postoperative changes of total disc arthroplasty at C4-5 anterior fusion with interbody spaces at C5-6 and C6-7.    Assessment:  Marshall Barboza is a 49 y.o. male with neck and lower back pain  1. Postlaminectomy syndrome of lumbar region    2. Lumbar radiculopathy    3. DDD (degenerative disc disease), lumbar         Plan:  1. I have stressed the importance of physical activity and exercise to improve overall health  2. Schedule repeat lumbar epidural steroid injection bilaterally at the L4-5 and L5-S1 levels. I have explained the risks, benefits, and alternatives of the procedure in detail. The patient voices understanding and all questions have been answered. The patient agrees to proceed as planned. Written Consent obtained.   3. May consider cervical DEMETRIA if neck pain does not improve in the future  4. He continue tramadol 50 mg q day as needed.  He takes this very sparingly.  5. Follow-up 3 months or sooner  All medication management was performed by Dr. Fidencio Vazquez

## 2019-07-26 DIAGNOSIS — M54.16 LUMBAR RADICULOPATHY: Primary | ICD-10-CM

## 2019-08-09 ENCOUNTER — HOSPITAL ENCOUNTER (OUTPATIENT)
Facility: AMBULARY SURGERY CENTER | Age: 49
Discharge: HOME | End: 2019-08-09
Attending: ANESTHESIOLOGY | Admitting: ANESTHESIOLOGY
Payer: OTHER GOVERNMENT

## 2019-08-09 DIAGNOSIS — M54.16 LUMBAR RADICULITIS: Primary | ICD-10-CM

## 2019-08-09 PROCEDURE — 64483 NJX AA&/STRD TFRM EPI L/S 1: CPT | Mod: RT | Performed by: ANESTHESIOLOGY

## 2019-08-09 PROCEDURE — 64483 PR EPIDURAL INJ, ANES/STEROID, TRANSFORAMINAL, LUMB/SACR, SNGL LEVL: ICD-10-PCS | Mod: 50,,, | Performed by: ANESTHESIOLOGY

## 2019-08-09 PROCEDURE — 64483 NJX AA&/STRD TFRM EPI L/S 1: CPT | Mod: 50,,, | Performed by: ANESTHESIOLOGY

## 2019-08-09 RX ORDER — LIDOCAINE HYDROCHLORIDE 10 MG/ML
INJECTION, SOLUTION EPIDURAL; INFILTRATION; INTRACAUDAL; PERINEURAL
Status: DISCONTINUED | OUTPATIENT
Start: 2019-08-09 | End: 2019-08-09 | Stop reason: HOSPADM

## 2019-08-09 RX ORDER — BUPIVACAINE HYDROCHLORIDE 2.5 MG/ML
INJECTION, SOLUTION EPIDURAL; INFILTRATION; INTRACAUDAL
Status: DISCONTINUED | OUTPATIENT
Start: 2019-08-09 | End: 2019-08-09 | Stop reason: HOSPADM

## 2019-08-09 RX ORDER — DEXAMETHASONE SODIUM PHOSPHATE 10 MG/ML
INJECTION INTRAMUSCULAR; INTRAVENOUS
Status: DISCONTINUED | OUTPATIENT
Start: 2019-08-09 | End: 2019-08-09 | Stop reason: HOSPADM

## 2019-08-09 RX ORDER — SODIUM CHLORIDE, SODIUM LACTATE, POTASSIUM CHLORIDE, CALCIUM CHLORIDE 600; 310; 30; 20 MG/100ML; MG/100ML; MG/100ML; MG/100ML
INJECTION, SOLUTION INTRAVENOUS ONCE AS NEEDED
Status: DISCONTINUED | OUTPATIENT
Start: 2019-08-09 | End: 2019-08-09 | Stop reason: HOSPADM

## 2019-08-09 NOTE — DISCHARGE INSTRUCTIONS
Before leaving, please make sure you have all your personal belongings such as glasses, purses, wallets, keys, cell phones, jewelry, jackets etc Pain injection instructions:     This procedure may take a couple weeks to relieve pain  You may get some pain relief from the local anesthetic initally.    No driving for 24 hrs.   Activity as tolerated- gradually increase activities.  Dont lift over 10 lbs for 24 hrs   No heat at injection sites x 2 days. No heating pads, hot tubs, saunas, or swimming in any body of water or pool for 2 days.  Use ice pack for mild swelling and for comfort , apply for 20 minutes, remove for 20 minute intervals. No direct contact of ice itself  to skin.  May shower today. Do not allow shower water to beat on injection sites for 2 days.No tub baths for two days.      Resume Aspirin, Plavix, or Coumadin the day after the procedure unless otherwise instructed.   If diabetic,monitor your glucose carefully as steroids can increase your glucose level    Seek immediate medical help for:   Severe increase in your usual pain or appearance of new pain.  Prolonged (mor bernice 8 hours) or increasing weakness or numbness in the legs or arms. Numbing medicine was injected and can affect the messages to and from the brain and legs or arms.  .    Fever above 100.4F ,Drainage,redness,active bleeding, or increased swelling at the injection site.  Headache, shortness of breath, chest pain, or breathing problems.

## 2019-08-09 NOTE — OP NOTE
PROCEDURE DATE: 8/9/2019    PROCEDURE: Bilateral L4-5 transforaminal epidural steroid injection under fluoroscopy    DIAGNOSIS: Lumbar disc displacement without myelopathy  Post op diagnosis: Same    PHYSICIAN: Fidencio Vazquez MD    MEDICATIONS INJECTED:  Dexamethasone 5mg (0.5ml) and 1.5ml 0.25% bupivicaine at each nerve root.     LOCAL ANESTHETIC INJECTED:  Lidocaine 1%. 2 ml per site.    SEDATION MEDICATIONS: None    ESTIMATED BLOOD LOSS:  None    COMPLICATIONS:  None    TECHNIQUE:   A time-out was taken to identify patient and procedure side prior to starting the procedure. The patient was placed in a prone position, prepped and draped in the usual sterile fashion using ChloraPrep and sterile towels.  The area to be injected was determined under fluoroscopic guidance in AP and oblique view.  Local anesthetic was given by raising a wheal and going down to the hub of a 25-gauge 1.5 inch needle.  In oblique view, a 3.5 inch 22-gauge bent-tip spinal needle was introduced towards 6 oclock position of the pedicle of each above named nerve root level.  The needle was walked medially then hinged into the neural foramen and position was confirmed in AP and lateral views.  1ml contrast dye was injected to confirm appropriate placement and that there was no vascular uptake.  After negative aspiration for blood or CSF, the medication was then injected. This was performed at the bilateral L4-5 level(s). The patient tolerated the procedure well.    The patient was monitored after the procedure.  Patient was given post procedure and discharge instructions to follow at home. The patient was discharged in a stable condition.

## 2019-08-09 NOTE — PLAN OF CARE
Patient sitting in chair and able to stand and ambulate at bedside without dizziness or weakness. Patient is alert and denies pain or nausea. Vital signs stable. She states she is ready to go home. All belongings listed on pre op check list have been returned to patient. Patient's spouse at bedside and states she is ready to to take patient home and that she is driving the patient home.

## 2019-08-09 NOTE — DISCHARGE SUMMARY
[facility]  Discharge Note  Short Stay    Admit Date: 8/9/2019    Discharge Date and Time: 8/9/2019    Attending Physician: Fidencio Vazquez MD     Discharge Provider: Fidencio Vzaquez    Diagnoses:  Active Hospital Problems    Diagnosis  POA    *Lumbar radiculitis [M54.16]  Yes      Resolved Hospital Problems   No resolved problems to display.       Hospital Course: Lumbar DEMETRIA  Discharged Condition: Good    Final Diagnoses:   Active Hospital Problems    Diagnosis  POA    *Lumbar radiculitis [M54.16]  Yes      Resolved Hospital Problems   No resolved problems to display.       Disposition: Home or Self Care    Follow up/Patient Instructions:    Medications:  Reconciled Home Medications:      Medication List      CONTINUE taking these medications    acyclovir 800 MG Tab  Commonly known as:  ZOVIRAX     amLODIPine 10 MG tablet  Commonly known as:  NORVASC     fluticasone propionate 50 mcg/actuation nasal spray  Commonly known as:  FLONASE     hydroCHLOROthiazide 25 MG tablet  Commonly known as:  HYDRODIURIL  Take 25 mg by mouth once daily.     ibuprofen 800 MG tablet  Commonly known as:  ADVIL,MOTRIN  Take 800 mg by mouth 3 (three) times daily.     meloxicam 15 MG tablet  Commonly known as:  MOBIC     methocarbamol 500 MG Tab  Commonly known as:  ROBAXIN     topiramate 50 MG tablet  Commonly known as:  TOPAMAX     * traMADol 50 mg tablet  Commonly known as:  ULTRAM  TAKE 1 TABLET BY MOUTH EVERY 12 HOURS AS NEEDED FOR PAIN     * traMADol 50 mg tablet  Commonly known as:  ULTRAM  Take 1 tablet (50 mg total) by mouth every 24 hours as needed for Pain.         * This list has 2 medication(s) that are the same as other medications prescribed for you. Read the directions carefully, and ask your doctor or other care provider to review them with you.              Discharge Procedure Orders   Call MD for:  temperature >100.4     Call MD for:  persistent nausea and vomiting or diarrhea     Call MD for:  severe uncontrolled  pain     Call MD for:  redness, tenderness, or signs of infection (pain, swelling, redness, odor or green/yellow discharge around incision site)     Call MD for:  difficulty breathing or increased cough     Call MD for:  severe persistent headache        Follow up with MD in 2-3 weeks    Discharge Procedure Orders (must include Diet, Follow-up, Activity):   Discharge Procedure Orders (must include Diet, Follow-up, Activity)   Call MD for:  temperature >100.4     Call MD for:  persistent nausea and vomiting or diarrhea     Call MD for:  severe uncontrolled pain     Call MD for:  redness, tenderness, or signs of infection (pain, swelling, redness, odor or green/yellow discharge around incision site)     Call MD for:  difficulty breathing or increased cough     Call MD for:  severe persistent headache

## 2019-08-16 VITALS
HEIGHT: 69 IN | TEMPERATURE: 98 F | WEIGHT: 180 LBS | RESPIRATION RATE: 18 BRPM | BODY MASS INDEX: 26.66 KG/M2 | SYSTOLIC BLOOD PRESSURE: 155 MMHG | OXYGEN SATURATION: 100 % | HEART RATE: 61 BPM | DIASTOLIC BLOOD PRESSURE: 101 MMHG

## 2019-10-21 ENCOUNTER — OFFICE VISIT (OUTPATIENT)
Dept: PAIN MEDICINE | Facility: CLINIC | Age: 49
End: 2019-10-21
Payer: OTHER GOVERNMENT

## 2019-10-21 VITALS
BODY MASS INDEX: 26.66 KG/M2 | DIASTOLIC BLOOD PRESSURE: 97 MMHG | HEART RATE: 63 BPM | WEIGHT: 180 LBS | HEIGHT: 69 IN | SYSTOLIC BLOOD PRESSURE: 139 MMHG

## 2019-10-21 DIAGNOSIS — M96.1 POSTLAMINECTOMY SYNDROME OF LUMBAR REGION: ICD-10-CM

## 2019-10-21 DIAGNOSIS — M50.30 DDD (DEGENERATIVE DISC DISEASE), CERVICAL: ICD-10-CM

## 2019-10-21 DIAGNOSIS — M54.16 LUMBAR RADICULITIS: Primary | ICD-10-CM

## 2019-10-21 DIAGNOSIS — M51.36 DDD (DEGENERATIVE DISC DISEASE), LUMBAR: ICD-10-CM

## 2019-10-21 PROCEDURE — 99214 OFFICE O/P EST MOD 30 MIN: CPT | Mod: S$PBB,,, | Performed by: ANESTHESIOLOGY

## 2019-10-21 PROCEDURE — 99999 PR PBB SHADOW E&M-EST. PATIENT-LVL III: CPT | Mod: PBBFAC,,, | Performed by: ANESTHESIOLOGY

## 2019-10-21 PROCEDURE — 99214 PR OFFICE/OUTPT VISIT, EST, LEVL IV, 30-39 MIN: ICD-10-PCS | Mod: S$PBB,,, | Performed by: ANESTHESIOLOGY

## 2019-10-21 PROCEDURE — 99999 PR PBB SHADOW E&M-EST. PATIENT-LVL III: ICD-10-PCS | Mod: PBBFAC,,, | Performed by: ANESTHESIOLOGY

## 2019-10-21 PROCEDURE — 99213 OFFICE O/P EST LOW 20 MIN: CPT | Mod: PBBFAC,PN | Performed by: ANESTHESIOLOGY

## 2019-10-21 RX ORDER — TRAMADOL HYDROCHLORIDE 50 MG/1
50 TABLET ORAL
Qty: 30 TABLET | Refills: 2 | Status: SHIPPED | OUTPATIENT
Start: 2019-10-21 | End: 2020-01-14 | Stop reason: SDUPTHER

## 2019-10-21 RX ORDER — DICLOFENAC SODIUM 10 MG/G
2 GEL TOPICAL DAILY
Qty: 1 TUBE | Refills: 2 | Status: SHIPPED | OUTPATIENT
Start: 2019-10-21 | End: 2020-03-17 | Stop reason: SDUPTHER

## 2019-10-21 NOTE — H&P (VIEW-ONLY)
Referring Physician: No ref. provider found    PCP: Sara Quan NP      CC:  Neck and lower back pain    Interval History:  Marshall Barboza is a 49 y.o. male with chronic neck and low back pain who returns to our clinic.  He has history of chronic neck and lower back pain.  He underwent repeat bilateral L4-5 TFESI in August which provided moderate benefit.  Pain has slowly recurred.  He is tired repeat procedure in the near future.  Otherwise, pain is currently tolerable.  He takes tramadol very sparingly with moderate benefit.  He denies any worsening weakness.  No bowel bladder changes.   Prior HPI:   Marshall Barboza is a 49 y.o. male referred to us for neck and lower back pain.  Patient is a former active  personnel.  He has significant history of cervical and lumbar spine surgery.  He underwent L4-5 laminotomy and diskectomy in February 2015.  Radicular pain improved but he continues to have lower back pain.  He also was found to have cervical DDD.  He underwent a C4-C7 ACDF.  Lower back and right leg pain is more bothersome currently.  He has constant aching, throbbing pain in his lower back.  Pain radiates to down his right buttock into his right calf.  Pain occasionally travels down the left calf.  He has had lumbar MB RFA procedures in the past at the  with short-lived relief.  He has undergone lumbar ESIwith moderate benefit for 3 months.  He currently takes tramadol very sparingly with mild-to-moderate benefit.  He denies any worsening weakness.  No bowel bladder changes.    Interventional history:  Bilateral L4-5 TFESI on 08/2018, 01/2019, 03/2019, 08/2019 with 50% relief.    ROS:  CONSTITUTIONAL: No fevers, chills, night sweats, wt. loss, appetite changes  SKIN: no rashes or itching  ENT: No headaches, head trauma, vision changes, or eye pain  LYMPH NODES: None noticed   CV: No chest pain, palpitations.   RESP: No shortness of breath, dyspnea on exertion, cough, wheezing, or  hemoptysis  GI: No nausea, emesis, diarrhea, constipation, melena, hematochezia, pain.    : No dysuria, hematuria, urgency, or frequency   HEME: No easy bruising, bleeding problems  PSYCHIATRIC: No depression, anxiety, psychosis, hallucinations.  NEURO: No seizures, memory loss, dizziness or difficulty sleeping  MSK:  Positive per HPI    Past Medical History:   Diagnosis Date    Allergy     Arthritis     Depression     Hypertension     Lumbar radiculitis 8/2/2018     Past Surgical History:   Procedure Laterality Date    BREAST SURGERY      FRACTURE SURGERY      HERNIA REPAIR      SPINE SURGERY      TRANSFORAMINAL EPIDURAL INJECTION OF STEROID Bilateral 3/12/2019    Procedure: Injection,steroid,epidural,transforaminal approach L4-5;  Surgeon: Fidencio Vazquez MD;  Location: [facility] OR;  Service: Pain Management;  Laterality: Bilateral;    TRANSFORAMINAL EPIDURAL INJECTION OF STEROID Bilateral 8/9/2019    Procedure: Injection,steroid,epidural,transforaminal approach;  Surgeon: Fidencio Vazquez MD;  Location: [facility] OR;  Service: Pain Management;  Laterality: Bilateral;  L4-5     No family history on file.  Social History     Socioeconomic History    Marital status:      Spouse name: Not on file    Number of children: Not on file    Years of education: Not on file    Highest education level: Not on file   Occupational History    Not on file   Social Needs    Financial resource strain: Not on file    Food insecurity:     Worry: Not on file     Inability: Not on file    Transportation needs:     Medical: Not on file     Non-medical: Not on file   Tobacco Use    Smoking status: Not on file    Smokeless tobacco: Never Used   Substance and Sexual Activity    Alcohol use: Yes     Alcohol/week: 1.0 standard drinks     Types: 1 Cans of beer per week    Drug use: No    Sexual activity: Yes   Lifestyle    Physical activity:     Days per week: Not on file     Minutes per session: Not on file    Stress: Not on  "file   Relationships    Social connections:     Talks on phone: Not on file     Gets together: Not on file     Attends Latter day service: Not on file     Active member of club or organization: Not on file     Attends meetings of clubs or organizations: Not on file     Relationship status: Not on file   Other Topics Concern    Not on file   Social History Narrative    Not on file       Medications/Allergies: See med card    Vitals:    10/21/19 0941   BP: (!) 139/97   Pulse: 63   Weight: 81.6 kg (180 lb)   Height: 5' 9" (1.753 m)   PainSc:   6   PainLoc: Back       Physical exam:    GENERAL: A and O x3, the patient appears well groomed and is in no acute distress.  Skin: No rashes or obvious lesions  HEENT: normocephalic, atraumatic  CARDIOVASCULAR:  RRR  LUNGS: non labored breathing  ABDOMEN: soft, nontender   UPPER EXTREMITIES: Normal alignment, normal range of motion, no atrophy, no skin changes,  hair growth and nail growth normal and equal bilaterally. No swelling, no tenderness.    LOWER EXTREMITIES:  Normal alignment, normal range of motion, no atrophy, no skin changes,  hair growth and nail growth normal and equal bilaterally. No swelling, no tenderness.  CERVICAL SPINE:  Cervical spine: ROM is limited in flexion, extension and lateral rotation with moderate increased pain.  Spurling's maneuver causes no neck pain to either side.  Myofascial exam: No Tenderness to palpation across cervical paraspinous region bilaterally.    LUMBAR SPINE  Lumbar spine: ROM is full with flexion extension and oblique extension with moderate increased pain.    Darrel's test causes no increased pain on either side.    Supine straight leg raise is positive on the right at 45°  Internal and external rotation of the hip causes no increased pain on either side.  Myofascial exam: No tenderness to palpation across lumbar paraspinous muscles.      MENTAL STATUS: normal orientation, speech, language, and fund of knowledge for social " situation.  Emotional state appropriate.    CRANIAL NERVES:  II:  PERRL bilaterally,   III,IV,VI: EOMI.    V:  Facial sensation equal bilaterally  VII:  Facial motor function normal.  VIII:  Hearing equal to finger rub bilaterally  IX/X: Gag normal, palate symmetric  XI:  Shoulder shrug equal, head turn equal  XII:  Tongue midline without fasciculations      MOTOR: Tone and bulk: normal bilateral upper and lower Strength: normal   Delt Bi Tri WE WF     R 5 5 5 5 5 5   L 5 5 5 5 5 5     IP ADD ABD Quad TA Gas HAM  R 5 5 5 5 5 5 5  L 5 5 5 5 5 5 5    SENSATION: Light touch and pinprick intact bilaterally  REFLEXES: normal, symmetric, nonbrisk.  Toes down, no clonus. No hoffmans.  GAIT:  Uses cane for assistance     Imaging:  MRI lumbar spine 01/2016 (outside records)  Lumbar spondylosis with small posterior disc protrusion L4-5 and L5-S1 with moderate bilateral neuroforaminal narrowing at L4-5.  Has postoperative changes of a laminotomy at L4-5 noted.    MRI cervical spine 05/2015  Postoperative changes of total disc arthroplasty at C4-5 anterior fusion with interbody spaces at C5-6 and C6-7.    Assessment:  Marshall Barboza is a 49 y.o. male with neck and lower back pain  1. Lumbar radiculitis    2. Postlaminectomy syndrome of lumbar region    3. DDD (degenerative disc disease), lumbar    4. DDD (degenerative disc disease), cervical         Plan:  1. I have stressed the importance of physical activity and exercise to improve overall health  2. Schedule repeat lumbar epidural steroid injection bilaterally at the L4-5 and L5-S1 levels. I have explained the risks, benefits, and alternatives of the procedure in detail. The patient voices understanding and all questions have been answered. The patient agrees to proceed as planned. Written Consent obtained.   3. May consider cervical DEMETRIA if neck pain does not improve in the future  4. He continue tramadol 50 mg q day as needed.  He takes this very sparingly.  Script  provided today  5. Follow-up 3 months or sooner       Agree with above NP note.  cv stable  patient presenting with atypical cp likley GERD  PPI  trop negative  cont home cv meds  care per med

## 2019-10-21 NOTE — PROGRESS NOTES
Referring Physician: No ref. provider found    PCP: Sara Quan NP      CC:  Neck and lower back pain    Interval History:  Marshall Barboza is a 49 y.o. male with chronic neck and low back pain who returns to our clinic.  He has history of chronic neck and lower back pain.  He underwent repeat bilateral L4-5 TFESI in August which provided moderate benefit.  Pain has slowly recurred.  He is tired repeat procedure in the near future.  Otherwise, pain is currently tolerable.  He takes tramadol very sparingly with moderate benefit.  He denies any worsening weakness.  No bowel bladder changes.   Prior HPI:   Marshall Barboza is a 49 y.o. male referred to us for neck and lower back pain.  Patient is a former active  personnel.  He has significant history of cervical and lumbar spine surgery.  He underwent L4-5 laminotomy and diskectomy in February 2015.  Radicular pain improved but he continues to have lower back pain.  He also was found to have cervical DDD.  He underwent a C4-C7 ACDF.  Lower back and right leg pain is more bothersome currently.  He has constant aching, throbbing pain in his lower back.  Pain radiates to down his right buttock into his right calf.  Pain occasionally travels down the left calf.  He has had lumbar MB RFA procedures in the past at the  with short-lived relief.  He has undergone lumbar ESIwith moderate benefit for 3 months.  He currently takes tramadol very sparingly with mild-to-moderate benefit.  He denies any worsening weakness.  No bowel bladder changes.    Interventional history:  Bilateral L4-5 TFESI on 08/2018, 01/2019, 03/2019, 08/2019 with 50% relief.    ROS:  CONSTITUTIONAL: No fevers, chills, night sweats, wt. loss, appetite changes  SKIN: no rashes or itching  ENT: No headaches, head trauma, vision changes, or eye pain  LYMPH NODES: None noticed   CV: No chest pain, palpitations.   RESP: No shortness of breath, dyspnea on exertion, cough, wheezing, or  hemoptysis  GI: No nausea, emesis, diarrhea, constipation, melena, hematochezia, pain.    : No dysuria, hematuria, urgency, or frequency   HEME: No easy bruising, bleeding problems  PSYCHIATRIC: No depression, anxiety, psychosis, hallucinations.  NEURO: No seizures, memory loss, dizziness or difficulty sleeping  MSK:  Positive per HPI    Past Medical History:   Diagnosis Date    Allergy     Arthritis     Depression     Hypertension     Lumbar radiculitis 8/2/2018     Past Surgical History:   Procedure Laterality Date    BREAST SURGERY      FRACTURE SURGERY      HERNIA REPAIR      SPINE SURGERY      TRANSFORAMINAL EPIDURAL INJECTION OF STEROID Bilateral 3/12/2019    Procedure: Injection,steroid,epidural,transforaminal approach L4-5;  Surgeon: Fidencio Vazquez MD;  Location: [facility] OR;  Service: Pain Management;  Laterality: Bilateral;    TRANSFORAMINAL EPIDURAL INJECTION OF STEROID Bilateral 8/9/2019    Procedure: Injection,steroid,epidural,transforaminal approach;  Surgeon: Fidencio Vazquez MD;  Location: [facility] OR;  Service: Pain Management;  Laterality: Bilateral;  L4-5     No family history on file.  Social History     Socioeconomic History    Marital status:      Spouse name: Not on file    Number of children: Not on file    Years of education: Not on file    Highest education level: Not on file   Occupational History    Not on file   Social Needs    Financial resource strain: Not on file    Food insecurity:     Worry: Not on file     Inability: Not on file    Transportation needs:     Medical: Not on file     Non-medical: Not on file   Tobacco Use    Smoking status: Not on file    Smokeless tobacco: Never Used   Substance and Sexual Activity    Alcohol use: Yes     Alcohol/week: 1.0 standard drinks     Types: 1 Cans of beer per week    Drug use: No    Sexual activity: Yes   Lifestyle    Physical activity:     Days per week: Not on file     Minutes per session: Not on file    Stress: Not on  "file   Relationships    Social connections:     Talks on phone: Not on file     Gets together: Not on file     Attends [employer] service: Not on file     Active member of club or organization: Not on file     Attends meetings of clubs or organizations: Not on file     Relationship status: Not on file   Other Topics Concern    Not on file   Social History Narrative    Not on file       Medications/Allergies: See med card    Vitals:    10/21/19 0941   BP: (!) 139/97   Pulse: 63   Weight: 81.6 kg (180 lb)   Height: 5' 9" (1.753 m)   PainSc:   6   PainLoc: Back       Physical exam:    GENERAL: A and O x3, the patient appears well groomed and is in no acute distress.  Skin: No rashes or obvious lesions  HEENT: normocephalic, atraumatic  CARDIOVASCULAR:  RRR  LUNGS: non labored breathing  ABDOMEN: soft, nontender   UPPER EXTREMITIES: Normal alignment, normal range of motion, no atrophy, no skin changes,  hair growth and nail growth normal and equal bilaterally. No swelling, no tenderness.    LOWER EXTREMITIES:  Normal alignment, normal range of motion, no atrophy, no skin changes,  hair growth and nail growth normal and equal bilaterally. No swelling, no tenderness.  CERVICAL SPINE:  Cervical spine: ROM is limited in flexion, extension and lateral rotation with moderate increased pain.  Spurling's maneuver causes no neck pain to either side.  Myofascial exam: No Tenderness to palpation across cervical paraspinous region bilaterally.    LUMBAR SPINE  Lumbar spine: ROM is full with flexion extension and oblique extension with moderate increased pain.    Darrel's test causes no increased pain on either side.    Supine straight leg raise is positive on the right at 45°  Internal and external rotation of the hip causes no increased pain on either side.  Myofascial exam: No tenderness to palpation across lumbar paraspinous muscles.      MENTAL STATUS: normal orientation, speech, language, and fund of knowledge for social " situation.  Emotional state appropriate.    CRANIAL NERVES:  II:  PERRL bilaterally,   III,IV,VI: EOMI.    V:  Facial sensation equal bilaterally  VII:  Facial motor function normal.  VIII:  Hearing equal to finger rub bilaterally  IX/X: Gag normal, palate symmetric  XI:  Shoulder shrug equal, head turn equal  XII:  Tongue midline without fasciculations      MOTOR: Tone and bulk: normal bilateral upper and lower Strength: normal   Delt Bi Tri WE WF     R 5 5 5 5 5 5   L 5 5 5 5 5 5     IP ADD ABD Quad TA Gas HAM  R 5 5 5 5 5 5 5  L 5 5 5 5 5 5 5    SENSATION: Light touch and pinprick intact bilaterally  REFLEXES: normal, symmetric, nonbrisk.  Toes down, no clonus. No hoffmans.  GAIT:  Uses cane for assistance     Imaging:  MRI lumbar spine 01/2016 (outside records)  Lumbar spondylosis with small posterior disc protrusion L4-5 and L5-S1 with moderate bilateral neuroforaminal narrowing at L4-5.  Has postoperative changes of a laminotomy at L4-5 noted.    MRI cervical spine 05/2015  Postoperative changes of total disc arthroplasty at C4-5 anterior fusion with interbody spaces at C5-6 and C6-7.    Assessment:  Marshall Barboza is a 49 y.o. male with neck and lower back pain  1. Lumbar radiculitis    2. Postlaminectomy syndrome of lumbar region    3. DDD (degenerative disc disease), lumbar    4. DDD (degenerative disc disease), cervical         Plan:  1. I have stressed the importance of physical activity and exercise to improve overall health  2. Schedule repeat lumbar epidural steroid injection bilaterally at the L4-5 and L5-S1 levels. I have explained the risks, benefits, and alternatives of the procedure in detail. The patient voices understanding and all questions have been answered. The patient agrees to proceed as planned. Written Consent obtained.   3. May consider cervical DEMETRIA if neck pain does not improve in the future  4. He continue tramadol 50 mg q day as needed.  He takes this very sparingly.  Script  provided today  5. Follow-up 3 months or sooner

## 2019-10-29 DIAGNOSIS — M54.16 LUMBAR RADICULOPATHY: Primary | ICD-10-CM

## 2019-11-15 ENCOUNTER — HOSPITAL ENCOUNTER (OUTPATIENT)
Facility: AMBULARY SURGERY CENTER | Age: 49
Discharge: HOME | End: 2019-11-15
Attending: ANESTHESIOLOGY | Admitting: ANESTHESIOLOGY
Payer: OTHER GOVERNMENT

## 2019-11-15 DIAGNOSIS — M54.16 LUMBAR RADICULITIS: Primary | ICD-10-CM

## 2019-11-15 PROCEDURE — 64483 NJX AA&/STRD TFRM EPI L/S 1: CPT | Mod: 50,,, | Performed by: ANESTHESIOLOGY

## 2019-11-15 PROCEDURE — 64483 PR EPIDURAL INJ, ANES/STEROID, TRANSFORAMINAL, LUMB/SACR, SNGL LEVL: ICD-10-PCS | Mod: 50,,, | Performed by: ANESTHESIOLOGY

## 2019-11-15 PROCEDURE — 64483 NJX AA&/STRD TFRM EPI L/S 1: CPT | Mod: RT | Performed by: ANESTHESIOLOGY

## 2019-11-15 RX ORDER — SODIUM CHLORIDE, SODIUM LACTATE, POTASSIUM CHLORIDE, CALCIUM CHLORIDE 600; 310; 30; 20 MG/100ML; MG/100ML; MG/100ML; MG/100ML
INJECTION, SOLUTION INTRAVENOUS ONCE AS NEEDED
Status: DISCONTINUED | OUTPATIENT
Start: 2019-11-15 | End: 2019-11-15 | Stop reason: HOSPADM

## 2019-11-15 RX ORDER — LIDOCAINE HYDROCHLORIDE 10 MG/ML
INJECTION, SOLUTION EPIDURAL; INFILTRATION; INTRACAUDAL; PERINEURAL
Status: DISCONTINUED | OUTPATIENT
Start: 2019-11-15 | End: 2019-11-15 | Stop reason: HOSPADM

## 2019-11-15 RX ORDER — DEXAMETHASONE SODIUM PHOSPHATE 10 MG/ML
INJECTION INTRAMUSCULAR; INTRAVENOUS
Status: DISCONTINUED | OUTPATIENT
Start: 2019-11-15 | End: 2019-11-15 | Stop reason: HOSPADM

## 2019-11-15 RX ORDER — BUPIVACAINE HYDROCHLORIDE 2.5 MG/ML
INJECTION, SOLUTION EPIDURAL; INFILTRATION; INTRACAUDAL
Status: DISCONTINUED | OUTPATIENT
Start: 2019-11-15 | End: 2019-11-15 | Stop reason: HOSPADM

## 2019-11-15 NOTE — DISCHARGE INSTRUCTIONS
Pain injection instructions:     This procedure may take a couple weeks to relieve pain  You may get some pain relief from the local anesthetic initally.    No driving for 24 hrs.   Activity as tolerated- gradually increase activities.  Dont lift over 10 lbs for 24 hrs   No heat at injection sites x 2 days. No heating pads, hot tubs, saunas, or swimming in any body of water or pool for 2 days.  Use ice pack for mild swelling and for comfort , apply for 20 minutes, remove for 20 minute intervals. No direct contact of ice itself  to skin.  May shower today. No tub baths for two days.      Resume Aspirin, Plavix, or Coumadin the day after the procedure unless otherwise instructed.   If diabetic,monitor your glucose carefully as steroids can increase your glucose level    Seek immediate medical help for:   Severe increase in your usual pain or appearance of new pain.  Prolonged (mor than 8 hours) or increasing weakness or numbness in the legs or arms. Numbing medicine was injected and can affect the messages to and from the brain and legs or arms.  .    Fever above 101 ,Drainage,redness,active bleeding, or increased swelling at the injection site.  Headache, shortness of breath, chest pain, or breathing problems.    After Surgery:  Always be aware that any surgery can cause these symptoms:    Pain- Medication can be prescribed for pain to decrease your pain but may not completely take your pain away. Over the Counter pain medicine my be enough and you can always use Ice and rest to help ease pain.    Bleeding- a little bleeding after a surgery is usually within normal.  If there is a lot of blood you need to notify your MD.  Emergency treatments of bleeding are cold application, elevation of the bleeding site and compression.    Infection- Infection after surgery is NOT a normal occurrence.  Signs of infection are fever, swelling, hot to touch the incision.  If this occurs notify your MD immediately.    Nausea- this can  be common after a surgery especially if you have had anesthesia medicine or are taking pain medicine.  Steroids have a side effect of nausea sometimes. Staying on clear liquids, bland foods, gingerale, or over the counter anti nausea medicines can help.  If you vomit more than once, notify your MD.  Anti Nausea medicines can be prescribed.      Before leaving, please make sure you have all your personal belongings such as glasses, purses, wallets, keys, cell phones, jewelry, jackets etc

## 2019-11-15 NOTE — OP NOTE
PROCEDURE DATE: 11/15/2019    PROCEDURE: Bilateral L4-5 transforaminal epidural steroid injection under fluoroscopy    DIAGNOSIS: Lumbar disc displacement without myelopathy  Post op diagnosis: Same    PHYSICIAN: Fidencio Vazquez MD    MEDICATIONS INJECTED:  Dexamethasone 5mg (0.5ml) and 1.5ml 0.25% bupivicaine at each nerve root.     LOCAL ANESTHETIC INJECTED:  Lidocaine 1%. 2 ml per site.    SEDATION MEDICATIONS: None    ESTIMATED BLOOD LOSS:  None    COMPLICATIONS:  None    TECHNIQUE:   A time-out was taken to identify patient and procedure side prior to starting the procedure. The patient was placed in a prone position, prepped and draped in the usual sterile fashion using ChloraPrep and sterile towels.  The area to be injected was determined under fluoroscopic guidance in AP and oblique view.  Local anesthetic was given by raising a wheal and going down to the hub of a 25-gauge 1.5 inch needle.  In oblique view, a 3.5 inch 22-gauge bent-tip spinal needle was introduced towards 6 oclock position of the pedicle of each above named nerve root level.  The needle was walked medially then hinged into the neural foramen and position was confirmed in AP and lateral views.  1ml contrast dye was injected to confirm appropriate placement and that there was no vascular uptake.  After negative aspiration for blood or CSF, the medication was then injected. This was performed at the bilateral L4-5 level(s). The patient tolerated the procedure well.    The patient was monitored after the procedure.  Patient was given post procedure and discharge instructions to follow at home. The patient was discharged in a stable condition.

## 2019-11-15 NOTE — DISCHARGE SUMMARY
[facility]  Discharge Note  Short Stay    Admit Date: 11/15/2019    Discharge Date and Time: 11/15/2019    Attending Physician: Fidencio Vazquez MD     Discharge Provider: Fidencio Vazquez    Diagnoses:  Active Hospital Problems    Diagnosis  POA    *Lumbar radiculitis [M54.16]  Yes      Resolved Hospital Problems   No resolved problems to display.       Hospital Course: Lumbar DEMETRIA  Discharged Condition: Good    Final Diagnoses:   Active Hospital Problems    Diagnosis  POA    *Lumbar radiculitis [M54.16]  Yes      Resolved Hospital Problems   No resolved problems to display.       Disposition: Home or Self Care    Follow up/Patient Instructions:    Medications:  Reconciled Home Medications:      Medication List      CONTINUE taking these medications    acyclovir 800 MG Tab  Commonly known as:  ZOVIRAX     amLODIPine 10 MG tablet  Commonly known as:  NORVASC     diclofenac sodium 1 % Gel  Commonly known as:  VOLTAREN  Apply 2 g topically once daily.     fluticasone propionate 50 mcg/actuation nasal spray  Commonly known as:  FLONASE     hydroCHLOROthiazide 25 MG tablet  Commonly known as:  HYDRODIURIL  Take 25 mg by mouth once daily.     ibuprofen 800 MG tablet  Commonly known as:  ADVIL,MOTRIN  Take 800 mg by mouth 3 (three) times daily.     meloxicam 15 MG tablet  Commonly known as:  MOBIC     methocarbamol 500 MG Tab  Commonly known as:  ROBAXIN     topiramate 50 MG tablet  Commonly known as:  TOPAMAX     traMADol 50 mg tablet  Commonly known as:  ULTRAM  Take 1 tablet (50 mg total) by mouth every 24 hours as needed for Pain. Medically necessary for greater than 7 days for chronic pain          Discharge Procedure Orders   Call MD for:  temperature >100.4     Call MD for:  persistent nausea and vomiting or diarrhea     Call MD for:  severe uncontrolled pain     Call MD for:  redness, tenderness, or signs of infection (pain, swelling, redness, odor or green/yellow discharge around incision site)     Call MD for:   difficulty breathing or increased cough     Call MD for:  severe persistent headache        Follow up with MD in 2-3 weeks    Discharge Procedure Orders (must include Diet, Follow-up, Activity):   Discharge Procedure Orders (must include Diet, Follow-up, Activity)   Call MD for:  temperature >100.4     Call MD for:  persistent nausea and vomiting or diarrhea     Call MD for:  severe uncontrolled pain     Call MD for:  redness, tenderness, or signs of infection (pain, swelling, redness, odor or green/yellow discharge around incision site)     Call MD for:  difficulty breathing or increased cough     Call MD for:  severe persistent headache

## 2019-11-19 VITALS
OXYGEN SATURATION: 96 % | BODY MASS INDEX: 26.66 KG/M2 | RESPIRATION RATE: 18 BRPM | SYSTOLIC BLOOD PRESSURE: 145 MMHG | WEIGHT: 180 LBS | HEIGHT: 69 IN | DIASTOLIC BLOOD PRESSURE: 94 MMHG | TEMPERATURE: 98 F | HEART RATE: 65 BPM

## 2020-01-14 ENCOUNTER — OFFICE VISIT (OUTPATIENT)
Dept: PAIN MEDICINE | Facility: CLINIC | Age: 50
End: 2020-01-14
Payer: OTHER GOVERNMENT

## 2020-01-14 ENCOUNTER — TELEPHONE (OUTPATIENT)
Dept: PAIN MEDICINE | Facility: CLINIC | Age: 50
End: 2020-01-14

## 2020-01-14 VITALS
HEIGHT: 69 IN | DIASTOLIC BLOOD PRESSURE: 108 MMHG | HEART RATE: 64 BPM | BODY MASS INDEX: 26.66 KG/M2 | WEIGHT: 180 LBS | SYSTOLIC BLOOD PRESSURE: 156 MMHG

## 2020-01-14 DIAGNOSIS — M54.16 LUMBAR RADICULITIS: ICD-10-CM

## 2020-01-14 DIAGNOSIS — M51.36 DDD (DEGENERATIVE DISC DISEASE), LUMBAR: ICD-10-CM

## 2020-01-14 DIAGNOSIS — M54.16 LUMBAR RADICULOPATHY: Primary | ICD-10-CM

## 2020-01-14 DIAGNOSIS — M50.30 DDD (DEGENERATIVE DISC DISEASE), CERVICAL: ICD-10-CM

## 2020-01-14 DIAGNOSIS — M96.1 POSTLAMINECTOMY SYNDROME OF LUMBAR REGION: ICD-10-CM

## 2020-01-14 PROCEDURE — 99214 OFFICE O/P EST MOD 30 MIN: CPT | Mod: S$PBB,,, | Performed by: PHYSICIAN ASSISTANT

## 2020-01-14 PROCEDURE — 99999 PR PBB SHADOW E&M-EST. PATIENT-LVL III: CPT | Mod: PBBFAC,,, | Performed by: PHYSICIAN ASSISTANT

## 2020-01-14 PROCEDURE — 99214 PR OFFICE/OUTPT VISIT, EST, LEVL IV, 30-39 MIN: ICD-10-PCS | Mod: S$PBB,,, | Performed by: PHYSICIAN ASSISTANT

## 2020-01-14 PROCEDURE — 99999 PR PBB SHADOW E&M-EST. PATIENT-LVL III: ICD-10-PCS | Mod: PBBFAC,,, | Performed by: PHYSICIAN ASSISTANT

## 2020-01-14 PROCEDURE — 99213 OFFICE O/P EST LOW 20 MIN: CPT | Mod: PBBFAC,PN | Performed by: PHYSICIAN ASSISTANT

## 2020-01-14 RX ORDER — TRAMADOL HYDROCHLORIDE 50 MG/1
50 TABLET ORAL
Qty: 30 TABLET | Refills: 0 | Status: SHIPPED | OUTPATIENT
Start: 2020-01-14 | End: 2020-01-15 | Stop reason: SDUPTHER

## 2020-01-14 NOTE — PROGRESS NOTES
Referring Physician: No ref. provider found    PCP: Sara Quan NP      CC:  Neck and lower back pain    Interval History:  Marshall Barboza is a 49 y.o. male with chronic neck and low back pain who presents today for f/u and medication refill.  Repeat L4-5 TF DEMETRIA  In November  provided 40-50% relief.   He underwent a repeat lumbar DEMETRIA on January 25, 2019 with moderate benefit of his low back and leg pain.   He underwent a series of 2 injections back in August and September 2018 which provided moderate benefit for several months.  Denies worsening LE weakness. Denies b/b changes. He take Tramadol sparingly. Complains of neck pain with numbness in left hand. He has a history of C4-C7 ACDF.  Pain today is rated 6/10.    Prior HPI:   Marshall Barboza is a 49 y.o. male referred to us for neck and lower back pain.  Patient is a former active  personnel.  He has significant history of cervical and lumbar spine surgery.  He underwent L4-5 laminotomy and diskectomy in February 2015.  Radicular pain improved but he continues to have lower back pain.  He also was found to have cervical DDD.  He underwent a C4-C7 ACDF.  Lower back and right leg pain is more bothersome currently.  He has constant aching, throbbing pain in his lower back.  Pain radiates to down his right buttock into his right calf.  Pain occasionally travels down the left calf.  He has had lumbar MB RFA procedures in the past at the  with short-lived relief.  He has undergone lumbar ESIwith moderate benefit for 3 months.  He currently takes tramadol very sparingly with mild-to-moderate benefit.  He denies any worsening weakness.  No bowel bladder changes.    ROS:  CONSTITUTIONAL: No fevers, chills, night sweats, wt. loss, appetite changes  SKIN: no rashes or itching  ENT: No headaches, head trauma, vision changes, or eye pain  LYMPH NODES: None noticed   CV: No chest pain, palpitations.   RESP: No shortness of breath, dyspnea on  exertion, cough, wheezing, or hemoptysis  GI: No nausea, emesis, diarrhea, constipation, melena, hematochezia, pain.    : No dysuria, hematuria, urgency, or frequency   HEME: No easy bruising, bleeding problems  PSYCHIATRIC: No depression, anxiety, psychosis, hallucinations.  NEURO: No seizures, memory loss, dizziness or difficulty sleeping  MSK:  Positive per HPI    Past Medical History:   Diagnosis Date    Allergy     Arthritis     Depression     Hypertension     Lumbar radiculitis 8/2/2018     Past Surgical History:   Procedure Laterality Date    BREAST SURGERY      FRACTURE SURGERY      HERNIA REPAIR      SPINE SURGERY      TRANSFORAMINAL EPIDURAL INJECTION OF STEROID Bilateral 3/12/2019    Procedure: Injection,steroid,epidural,transforaminal approach L4-5;  Surgeon: Fidencio Vazquez MD;  Location: [facility] OR;  Service: Pain Management;  Laterality: Bilateral;    TRANSFORAMINAL EPIDURAL INJECTION OF STEROID Bilateral 8/9/2019    Procedure: Injection,steroid,epidural,transforaminal approach;  Surgeon: Fidencio Vazquez MD;  Location: [facility] OR;  Service: Pain Management;  Laterality: Bilateral;  L4-5    TRANSFORAMINAL EPIDURAL INJECTION OF STEROID Bilateral 11/15/2019    Procedure: Injection,steroid,epidural,transforaminal approach;  Surgeon: Fidecnio Vazquez MD;  Location: [facility] OR;  Service: Pain Management;  Laterality: Bilateral;  L4-L5     History reviewed. No pertinent family history.  Social History     Socioeconomic History    Marital status:      Spouse name: Not on file    Number of children: Not on file    Years of education: Not on file    Highest education level: Not on file   Occupational History    Not on file   Social Needs    Financial resource strain: Not on file    Food insecurity:     Worry: Not on file     Inability: Not on file    Transportation needs:     Medical: Not on file     Non-medical: Not on file   Tobacco Use    Smoking status: Not on file    Smokeless tobacco: Never Used  "  Substance and Sexual Activity    Alcohol use: Yes     Alcohol/week: 1.0 standard drinks     Types: 1 Cans of beer per week    Drug use: No    Sexual activity: Yes   Lifestyle    Physical activity:     Days per week: Not on file     Minutes per session: Not on file    Stress: Not on file   Relationships    Social connections:     Talks on phone: Not on file     Gets together: Not on file     Attends Denominational service: Not on file     Active member of club or organization: Not on file     Attends meetings of clubs or organizations: Not on file     Relationship status: Not on file   Other Topics Concern    Not on file   Social History Narrative    Not on file       Medications/Allergies: See med card    Vitals:    01/14/20 0940   BP: (!) 156/108   Pulse: 64   Weight: 81.6 kg (180 lb)   Height: 5' 9" (1.753 m)   PainSc:   6   PainLoc: Back       Physical exam:    GENERAL: A and O x3, the patient appears well groomed and is in no acute distress.  Skin: No rashes or obvious lesions  HEENT: normocephalic, atraumatic  CARDIOVASCULAR:  RRR  LUNGS: non labored breathing  ABDOMEN: soft, nontender   UPPER EXTREMITIES: Normal alignment, normal range of motion, no atrophy, no skin changes,  hair growth and nail growth normal and equal bilaterally. No swelling, no tenderness.    LOWER EXTREMITIES:  Normal alignment, normal range of motion, no atrophy, no skin changes,  hair growth and nail growth normal and equal bilaterally. No swelling, no tenderness.  CERVICAL SPINE:  Cervical spine: ROM is limited in flexion, extension and lateral rotation with moderate increased pain.  Spurling's maneuver causes no neck pain to either side.  Myofascial exam: No Tenderness to palpation across cervical paraspinous region bilaterally.    LUMBAR SPINE  Lumbar spine: ROM is full with flexion extension and oblique extension with moderate increased pain.    Darrel's test causes no increased pain on either side.    Supine straight leg " raise is positive on the right at 45°  Internal and external rotation of the hip causes no increased pain on either side.  Myofascial exam: No tenderness to palpation across lumbar paraspinous muscles.      MENTAL STATUS: normal orientation, speech, language, and fund of knowledge for social situation.  Emotional state appropriate.    CRANIAL NERVES:  II:  PERRL bilaterally,   III,IV,VI: EOMI.    V:  Facial sensation equal bilaterally  VII:  Facial motor function normal.  VIII:  Hearing equal to finger rub bilaterally  IX/X: Gag normal, palate symmetric  XI:  Shoulder shrug equal, head turn equal  XII:  Tongue midline without fasciculations      MOTOR: Tone and bulk: normal bilateral upper and lower Strength: normal   Delt Bi Tri WE WF     R 5 5 5 5 5 5   L 5 5 5 5 5 5     IP ADD ABD Quad TA Gas HAM  R 5 5 5 5 5 5 5  L 5 5 5 5 5 5 5    SENSATION: Light touch and pinprick intact bilaterally  REFLEXES: normal, symmetric, nonbrisk.  Toes down, no clonus. No hoffmans.  GAIT:  Uses cane for assistance     Imaging:  MRI lumbar spine 01/2016 (outside records)  Lumbar spondylosis with small posterior disc protrusion L4-5 and L5-S1 with moderate bilateral neuroforaminal narrowing at L4-5.  Has postoperative changes of a laminotomy at L4-5 noted.    MRI cervical spine 05/2015  Postoperative changes of total disc arthroplasty at C4-5 anterior fusion with interbody spaces at C5-6 and C6-7.    Assessment:  Marshall Barboza is a 49 y.o. male with neck and lower back pain  1. Lumbar radiculopathy    2. Postlaminectomy syndrome of lumbar region    3. DDD (degenerative disc disease), lumbar    4. DDD (degenerative disc disease), cervical       Plan:  1. I have stressed the importance of physical activity and exercise to improve overall health  2. Monitor progress and consider repeat lumbar epidural steroid injection bilaterally at the L4-5 and L5-S1 levels.  3. May consider cervical DEMETRIA if neck pain does not improve in the  future  4. He continue tramadol 50 mg q day as needed.  He takes this very sparingly.  5.  Follow up with PCP regarding elevated BP  6. Follow-up 3 months or sooner  All medication management was performed by Dr. Fidencio Vazquez

## 2020-01-14 NOTE — TELEPHONE ENCOUNTER
----- Message from Anushka Tamara sent at 1/14/2020 12:01 PM CST -----  Contact: pt   Type: Needs Medical Advice    Who Called:      Best Call Back Number:     Additional Information: Requesting a call back from Nurse, regarding RX   traMADol (ULTRAM) 50 mg tablet 30 tablet was not suppose to go to [facility] and pt stated that at visit today with nurse ,please send over [facility] [address] ASAP per pt request ,please call when Rx is sent over

## 2020-01-15 ENCOUNTER — TELEPHONE (OUTPATIENT)
Dept: PAIN MEDICINE | Facility: CLINIC | Age: 50
End: 2020-01-15

## 2020-01-15 DIAGNOSIS — M54.16 LUMBAR RADICULITIS: ICD-10-CM

## 2020-01-15 RX ORDER — TRAMADOL HYDROCHLORIDE 50 MG/1
50 TABLET ORAL
Qty: 30 TABLET | Refills: 0 | Status: SHIPPED | OUTPATIENT
Start: 2020-01-15 | End: 2020-01-16 | Stop reason: SDUPTHER

## 2020-01-15 NOTE — TELEPHONE ENCOUNTER
----- Message from Karin Sherman sent at 1/15/2020  2:27 PM CST -----  Contact: pt  Pt calling states his traMADol (ULTRAM) 50 mg tablet is still not at the pharmacy. In the system looks like it was sent today by there was a problem...[phone] (home)             .  [id number] - [place], [place] - [address] AT [facility] & [facility] [address]  [address]  [place] [address]  Phone: [phone] Fax: [phone]

## 2020-01-15 NOTE — TELEPHONE ENCOUNTER
----- Message from Donnie Barboza sent at 1/15/2020  8:44 AM CST -----  Contact: Patient  Type: Needs Medical Advice    Who Called:  Patient  Pharmacy name and phone #:    [id number] - [place], [place] - [address] AT [facility] [address]  [address]  [place] [address]  Phone: [phone] Fax: [phone]  Best Call Back Number: [phone]  Additional Information: Patient states traMADol (ULTRAM) 50 mg tablet was sent to the wrong pharmacy. Please send to above. Thanks!

## 2020-01-15 NOTE — TELEPHONE ENCOUNTER
Pt informed script failed to go through, advised he will have to come pick it up, pt agreed. Please print

## 2020-01-16 RX ORDER — TRAMADOL HYDROCHLORIDE 50 MG/1
50 TABLET ORAL
Qty: 30 TABLET | Refills: 2 | Status: SHIPPED | OUTPATIENT
Start: 2020-01-16 | End: 2020-05-19 | Stop reason: SDUPTHER

## 2020-01-21 ENCOUNTER — TELEPHONE (OUTPATIENT)
Dept: PAIN MEDICINE | Facility: CLINIC | Age: 50
End: 2020-01-21

## 2020-01-21 NOTE — TELEPHONE ENCOUNTER
----- Message from Fina Grubbs sent at 1/21/2020 12:46 PM CST -----  Contact: self [phone]  Patient is requesting a call back from the nurse requesting to schedule back injections.    Please call the patient upon request at phone number [phone].

## 2020-01-22 DIAGNOSIS — M54.16 LUMBAR RADICULOPATHY: Primary | ICD-10-CM

## 2020-02-18 ENCOUNTER — HOSPITAL ENCOUNTER (OUTPATIENT)
Facility: AMBULARY SURGERY CENTER | Age: 50
Discharge: HOME | End: 2020-02-18
Attending: ANESTHESIOLOGY | Admitting: ANESTHESIOLOGY
Payer: OTHER GOVERNMENT

## 2020-02-18 DIAGNOSIS — M54.16 LUMBAR RADICULITIS: Primary | ICD-10-CM

## 2020-02-18 PROCEDURE — 64483 NJX AA&/STRD TFRM EPI L/S 1: CPT | Mod: RT | Performed by: ANESTHESIOLOGY

## 2020-02-18 PROCEDURE — 64483 NJX AA&/STRD TFRM EPI L/S 1: CPT | Mod: 50,,, | Performed by: ANESTHESIOLOGY

## 2020-02-18 PROCEDURE — 64483 PR EPIDURAL INJ, ANES/STEROID, TRANSFORAMINAL, LUMB/SACR, SNGL LEVL: ICD-10-PCS | Mod: 50,,, | Performed by: ANESTHESIOLOGY

## 2020-02-18 RX ORDER — DEXAMETHASONE SODIUM PHOSPHATE 10 MG/ML
INJECTION INTRAMUSCULAR; INTRAVENOUS
Status: DISCONTINUED | OUTPATIENT
Start: 2020-02-18 | End: 2020-02-18 | Stop reason: HOSPADM

## 2020-02-18 RX ORDER — BUPIVACAINE HYDROCHLORIDE 2.5 MG/ML
INJECTION, SOLUTION EPIDURAL; INFILTRATION; INTRACAUDAL
Status: DISCONTINUED | OUTPATIENT
Start: 2020-02-18 | End: 2020-02-18 | Stop reason: HOSPADM

## 2020-02-18 RX ORDER — LIDOCAINE HYDROCHLORIDE 10 MG/ML
INJECTION, SOLUTION EPIDURAL; INFILTRATION; INTRACAUDAL; PERINEURAL
Status: DISCONTINUED | OUTPATIENT
Start: 2020-02-18 | End: 2020-02-18 | Stop reason: HOSPADM

## 2020-02-18 RX ORDER — SODIUM CHLORIDE, SODIUM LACTATE, POTASSIUM CHLORIDE, CALCIUM CHLORIDE 600; 310; 30; 20 MG/100ML; MG/100ML; MG/100ML; MG/100ML
INJECTION, SOLUTION INTRAVENOUS ONCE AS NEEDED
Status: DISCONTINUED | OUTPATIENT
Start: 2020-02-18 | End: 2020-02-18 | Stop reason: HOSPADM

## 2020-02-18 NOTE — DISCHARGE SUMMARY
[facility]  Discharge Note  Short Stay    Admit Date: 2/18/2020    Discharge Date and Time: 2/18/2020    Attending Physician: Fidencio Vazquez MD     Discharge Provider: Fidencio Vazquez    Diagnoses:  Active Hospital Problems    Diagnosis  POA    *Lumbar radiculitis [M54.16]  Yes      Resolved Hospital Problems   No resolved problems to display.       Hospital Course: Lumbar DEMETRIA  Discharged Condition: Good    Final Diagnoses:   Active Hospital Problems    Diagnosis  POA    *Lumbar radiculitis [M54.16]  Yes      Resolved Hospital Problems   No resolved problems to display.       Disposition: Home or Self Care    Follow up/Patient Instructions:    Medications:  Reconciled Home Medications:      Medication List      CONTINUE taking these medications    acyclovir 800 MG Tab  Commonly known as:  ZOVIRAX     amLODIPine 10 MG tablet  Commonly known as:  NORVASC     diclofenac sodium 1 % Gel  Commonly known as:  VOLTAREN  Apply 2 g topically once daily.     fluticasone propionate 50 mcg/actuation nasal spray  Commonly known as:  FLONASE     hydroCHLOROthiazide 25 MG tablet  Commonly known as:  HYDRODIURIL  Take 25 mg by mouth once daily.     ibuprofen 800 MG tablet  Commonly known as:  ADVIL,MOTRIN  Take 800 mg by mouth 3 (three) times daily.     meloxicam 15 MG tablet  Commonly known as:  MOBIC     methocarbamol 500 MG Tab  Commonly known as:  ROBAXIN     topiramate 50 MG tablet  Commonly known as:  TOPAMAX     traMADol 50 mg tablet  Commonly known as:  ULTRAM  Take 1 tablet (50 mg total) by mouth every 24 hours as needed for Pain. Medically necessary for greater than 7 days for chronic pain          Discharge Procedure Orders   Call MD for:  temperature >100.4     Call MD for:  persistent nausea and vomiting or diarrhea     Call MD for:  severe uncontrolled pain     Call MD for:  redness, tenderness, or signs of infection (pain, swelling, redness, odor or green/yellow discharge around incision site)     Call MD for:   difficulty breathing or increased cough     Call MD for:  severe persistent headache        Follow up with MD in 2-3 weeks    Discharge Procedure Orders (must include Diet, Follow-up, Activity):   Discharge Procedure Orders (must include Diet, Follow-up, Activity)   Call MD for:  temperature >100.4     Call MD for:  persistent nausea and vomiting or diarrhea     Call MD for:  severe uncontrolled pain     Call MD for:  redness, tenderness, or signs of infection (pain, swelling, redness, odor or green/yellow discharge around incision site)     Call MD for:  difficulty breathing or increased cough     Call MD for:  severe persistent headache

## 2020-02-18 NOTE — DISCHARGE INSTRUCTIONS
Before leaving, please make sure you have all your personal belongings such as glasses, purses, wallets, keys, cell phones, jewelry, jackets etc      Pain injection instructions:     This procedure may take a couple weeks to relieve pain  You may get some pain relief from the local anesthetic initally.    No driving for 24 hrs.   Activity as tolerated- gradually increase activities.  Dont lift over 10 lbs for 24 hrs   No heat at injection sites x 2 days. No heating pads, hot tubs, saunas, or swimming in any body of water or pool for 2 days.  Use ice pack for mild swelling and for comfort , apply for 20 minutes, remove for 20 minute intervals. No direct contact of ice itself  to skin.  May shower today. Do not allow shower water to beat on injection site for 2 days.No tub baths for two days.      Resume Aspirin, Plavix, or Coumadin the day after the procedure unless otherwise instructed.   If diabetic,monitor your glucose carefully as steroids can increase your glucose level    Seek immediate medical help for:   Severe increase in your usual pain or appearance of new pain.  Prolonged (more than 8 hours) or increasing weakness or numbness in the legs or arms. Numbing medicine was injected and can affect the messages to and from the brain and legs or arms.  .    Fever above 100.4 degrees F ,Drainage,redness,active bleeding, or increased swelling at the injection site.  Headache, shortness of breath, chest pain, or breathing problems.    After Surgery:  Always be aware that any surgery can cause these symptoms:    Pain- Medication can be prescribed for pain to decrease your pain but may not completely take your pain away. Over the Counter pain medicine my be enough and you can always use Ice and rest to help ease pain.    Bleeding- a little bleeding after a surgery is usually within normal.  If there is a lot of blood you need to notify your MD.  Emergency treatments of bleeding are cold application, elevation of the  bleeding site and compression.    Infection- Infection after surgery is NOT a normal occurrence.  Signs of infection are fever, swelling, hot to touch the incision.  If this occurs notify your MD immediately.    Nausea- this can be common after a surgery especially if you have had anesthesia medicine or are taking pain medicine.  Steroids have a side effect of nausea sometimes. Staying on clear liquids, bland foods, gingerale, or over the counter anti nausea medicines can help.  If you vomit more than once, notify your MD.  Anti Nausea medicines can be prescribed.

## 2020-02-18 NOTE — H&P
CC: low back pain    HPI: The patient is a 49 y.o. male with a history of lumbar DDD here for lumbar DEMETRIA. There are no major changes in history and physical from 1/14/2020 by Kenya.    Past Medical History:   Diagnosis Date    Allergy     Arthritis     Depression     Hypertension     Lumbar radiculitis 8/2/2018       Past Surgical History:   Procedure Laterality Date    BREAST SURGERY      FRACTURE SURGERY      HERNIA REPAIR      SPINE SURGERY      TRANSFORAMINAL EPIDURAL INJECTION OF STEROID Bilateral 3/12/2019    Procedure: Injection,steroid,epidural,transforaminal approach L4-5;  Surgeon: Fidencio Vazquez MD;  Location: [facility] OR;  Service: Pain Management;  Laterality: Bilateral;    TRANSFORAMINAL EPIDURAL INJECTION OF STEROID Bilateral 8/9/2019    Procedure: Injection,steroid,epidural,transforaminal approach;  Surgeon: Fidencio Vazquez MD;  Location: [facility] OR;  Service: Pain Management;  Laterality: Bilateral;  L4-5    TRANSFORAMINAL EPIDURAL INJECTION OF STEROID Bilateral 11/15/2019    Procedure: Injection,steroid,epidural,transforaminal approach;  Surgeon: Fidencio Vazquez MD;  Location: [facility] OR;  Service: Pain Management;  Laterality: Bilateral;  L4-L5       History reviewed. No pertinent family history.    Social History     Socioeconomic History    Marital status:      Spouse name: Not on file    Number of children: Not on file    Years of education: Not on file    Highest education level: Not on file   Occupational History    Not on file   Social Needs    Financial resource strain: Not on file    Food insecurity:     Worry: Not on file     Inability: Not on file    Transportation needs:     Medical: Not on file     Non-medical: Not on file   Tobacco Use    Smoking status: Never Smoker    Smokeless tobacco: Never Used   Substance and Sexual Activity    Alcohol use: Yes     Alcohol/week: 1.0 standard drinks     Types: 1 Cans of beer per week    Drug use: No    Sexual activity: Yes   Lifestyle  "   Physical activity:     Days per week: Not on file     Minutes per session: Not on file    Stress: Not on file   Relationships    Social connections:     Talks on phone: Not on file     Gets together: Not on file     Attends [employer] service: Not on file     Active member of club or organization: Not on file     Attends meetings of clubs or organizations: Not on file     Relationship status: Not on file   Other Topics Concern    Not on file   Social History Narrative    Not on file       Current Facility-Administered Medications   Medication Dose Route Frequency Provider Last Rate Last Dose    bupivacaine (PF) 0.25% (2.5 mg/ml) injection    PRN Fidencio Vazquez MD   3 mL at 02/18/20 1014    dexAMETHasone sodium phos (PF) injection    PRN Fidencio Vazquez MD   10 mg at 02/18/20 1014    iohexol (OMNIPAQUE 300) injection    PRN Fidencio Vazquez MD   5 mL at 02/18/20 1014    lactated ringers infusion   Intravenous Once PRN Fidencio Vazquez MD        lidocaine (PF) 10 mg/ml (1%) injection    PRN Fidencio Vazquez MD   5 mL at 02/18/20 1014     Facility-Administered Medications Ordered in Other Encounters   Medication Dose Route Frequency Provider Last Rate Last Dose    lactated ringers infusion   Intravenous Once PRN Fidencio Vazquez MD           Review of patient's allergies indicates:   Allergen Reactions    Pregabalin Other (See Comments)       Vitals:    02/14/20 0650 02/18/20 1010   BP:  (!) 145/94   Pulse:  64   Resp:  16   Temp:  98.2 °F (36.8 °C)   SpO2:  97%   Weight: 81.6 kg (180 lb)    Height: 5' 9" (1.753 m)        REVIEW OF SYSTEMS:     GENERAL: No weight loss, malaise or fevers.  HEENT:  No recent changes in vision or hearing  NECK: Negative for lumps, no difficulty with swallowing.  RESPIRATORY: Negative for cough, wheezing or shortness of breath, patient denies any recent URI.  CARDIOVASCULAR: Negative for chest pain, leg swelling or palpitations.  GI: Negative for abdominal discomfort, blood in stools or black stools or " change in bowel habits.  MUSCULOSKELETAL: See HPI.  SKIN: Negative for lesions, rash, and itching.  PSYCH: No suicidal or homicidal ideations, no current mood disturbances.  HEMATOLOGY/LYMPHOLOGY: Negative for prolonged bleeding, bruising easily or swollen nodes. Patient is not currently taking any anti-coagulants  ENDO: No history of diabetes or thyroid dysfunction  NEURO: No history of syncope, paralysis, seizures or tremors.All other reviewed and negative other than HPI.    Physical exam:  Gen: A and O x3, pleasant, well-groomed  Skin: No rashes or obvious lesions  HEENT: PERRLA, no obvious deformities on ears or in canals. No thyroid masses, trachea midline, no palpable lymph nodes in neck, axilla.  CVS: Regular rate and rhythm, normal S1 and S2, no murmurs.  Resp: Clear to auscultation bilaterally.  Abdomen: Soft, NT/ND, normal bowel sounds present.  Musculoskeletal/Neuro: Moving all extremities    Assessment:  Lumbar radiculitis  -     Place in Outpatient; Standing  -     Vital signs; Standing  -     Insert peripheral IV; Standing  -     Verify informed consent; Standing  -     Notify physician ; Standing  -     Notify physician ; Standing  -     Notify physician (specify); Standing  -     Diet NPO; Standing    Other orders  -     Progressive Mobility Protocol (mobilize patient to their highest level of functioning at least twice daily); Standing  -     lactated ringers infusion  -     IP VTE LOW RISK PATIENT; Standing  -     bupivacaine (PF) 0.25% (2.5 mg/ml) injection  -     dexAMETHasone sodium phos (PF) injection  -     iohexol (OMNIPAQUE 300) injection  -     lidocaine (PF) 10 mg/ml (1%) injection          PLAN: Lumbar DEMETRIA      This patient has been cleared for surgery in an ambulatory surgical facility    ASA 3,  Mallampatti Score 3  No history of anesthetic complications  Plan for RN IV sedation

## 2020-02-18 NOTE — OP NOTE
PROCEDURE DATE: 2/18/2020    PROCEDURE: Bilateral L4-5 transforaminal epidural steroid injection under fluoroscopy    DIAGNOSIS: Lumbar disc displacement without myelopathy  Post op diagnosis: Same    PHYSICIAN: Fidencio Vazquez MD    MEDICATIONS INJECTED:  Dexamethasone 5mg (0.5ml) and 1.5ml 0.25% bupivicaine at each nerve root.     LOCAL ANESTHETIC INJECTED:  Lidocaine 1%. 2 ml per site.    SEDATION MEDICATIONS: none    ESTIMATED BLOOD LOSS:  None    COMPLICATIONS:  None    TECHNIQUE:   A time-out was taken to identify patient and procedure side prior to starting the procedure. The patient was placed in a prone position, prepped and draped in the usual sterile fashion using ChloraPrep and sterile towels.  The area to be injected was determined under fluoroscopic guidance in AP and oblique view.  Local anesthetic was given by raising a wheal and going down to the hub of a 25-gauge 1.5 inch needle.  In oblique view, a 3.5 inch 22-gauge bent-tip spinal needle was introduced towards 6 oclock position of the pedicle of each above named nerve root level.  The needle was walked medially then hinged into the neural foramen and position was confirmed in AP and lateral views.  1ml contrast dye was injected to confirm appropriate placement and that there was no vascular uptake.  After negative aspiration for blood or CSF, the medication was then injected. This was performed at the bilateral L4-5 level(s). The patient tolerated the procedure well.    The patient was monitored after the procedure.  Patient was given post procedure and discharge instructions to follow at home. The patient was discharged in a stable condition.

## 2020-02-19 VITALS
HEART RATE: 66 BPM | DIASTOLIC BLOOD PRESSURE: 100 MMHG | OXYGEN SATURATION: 99 % | HEIGHT: 69 IN | BODY MASS INDEX: 26.66 KG/M2 | RESPIRATION RATE: 18 BRPM | SYSTOLIC BLOOD PRESSURE: 147 MMHG | TEMPERATURE: 98 F | WEIGHT: 180 LBS

## 2020-03-17 ENCOUNTER — OFFICE VISIT (OUTPATIENT)
Dept: PAIN MEDICINE | Facility: CLINIC | Age: 50
End: 2020-03-17
Payer: OTHER GOVERNMENT

## 2020-03-17 VITALS
HEART RATE: 67 BPM | SYSTOLIC BLOOD PRESSURE: 128 MMHG | WEIGHT: 180 LBS | HEIGHT: 69 IN | DIASTOLIC BLOOD PRESSURE: 88 MMHG | BODY MASS INDEX: 26.66 KG/M2

## 2020-03-17 DIAGNOSIS — M51.36 DDD (DEGENERATIVE DISC DISEASE), LUMBAR: ICD-10-CM

## 2020-03-17 DIAGNOSIS — M96.1 POSTLAMINECTOMY SYNDROME OF LUMBAR REGION: ICD-10-CM

## 2020-03-17 DIAGNOSIS — M54.16 LUMBAR RADICULITIS: Primary | ICD-10-CM

## 2020-03-17 PROCEDURE — 99213 OFFICE O/P EST LOW 20 MIN: CPT | Mod: PBBFAC,PN | Performed by: PHYSICIAN ASSISTANT

## 2020-03-17 PROCEDURE — 99214 OFFICE O/P EST MOD 30 MIN: CPT | Mod: S$PBB,,, | Performed by: PHYSICIAN ASSISTANT

## 2020-03-17 PROCEDURE — 99999 PR PBB SHADOW E&M-EST. PATIENT-LVL III: CPT | Mod: PBBFAC,,, | Performed by: PHYSICIAN ASSISTANT

## 2020-03-17 PROCEDURE — 99214 PR OFFICE/OUTPT VISIT, EST, LEVL IV, 30-39 MIN: ICD-10-PCS | Mod: S$PBB,,, | Performed by: PHYSICIAN ASSISTANT

## 2020-03-17 PROCEDURE — 99999 PR PBB SHADOW E&M-EST. PATIENT-LVL III: ICD-10-PCS | Mod: PBBFAC,,, | Performed by: PHYSICIAN ASSISTANT

## 2020-03-17 RX ORDER — DICLOFENAC SODIUM 10 MG/G
2 GEL TOPICAL DAILY
Qty: 1 TUBE | Refills: 2 | Status: SHIPPED | OUTPATIENT
Start: 2020-03-17

## 2020-03-17 NOTE — PROGRESS NOTES
Referring Physician: No ref. provider found    PCP: Sara Quan NP      CC:  Neck and lower back pain    Interval History:  Marshall Barboza is a 49 y.o. male with chronic neck and low back pain who presents today for f/u s/p repeat Repeat L4-5 TF DEMETRIA that provided 40-50% relief.   Denies worsening LE weakness. Denies b/b changes. He take Tramadol sparingly. Complains of neck pain with numbness in left hand. He has a history of C4-C7 ACDF.  Pain today is rated 5/10.     Prior HPI:   Marshall Barboza is a 49 y.o. male referred to us for neck and lower back pain.  Patient is a former active  personnel.  He has significant history of cervical and lumbar spine surgery.  He underwent L4-5 laminotomy and diskectomy in February 2015.  Radicular pain improved but he continues to have lower back pain.  He also was found to have cervical DDD.  He underwent a C4-C7 ACDF.  Lower back and right leg pain is more bothersome currently.  He has constant aching, throbbing pain in his lower back.  Pain radiates to down his right buttock into his right calf.  Pain occasionally travels down the left calf.  He has had lumbar MB RFA procedures in the past at the  with short-lived relief.  He has undergone lumbar ESIwith moderate benefit for 3 months.  He currently takes tramadol very sparingly with mild-to-moderate benefit.  He denies any worsening weakness.  No bowel bladder changes.    ROS:  CONSTITUTIONAL: No fevers, chills, night sweats, wt. loss, appetite changes  SKIN: no rashes or itching  ENT: No headaches, head trauma, vision changes, or eye pain  LYMPH NODES: None noticed   CV: No chest pain, palpitations.   RESP: No shortness of breath, dyspnea on exertion, cough, wheezing, or hemoptysis  GI: No nausea, emesis, diarrhea, constipation, melena, hematochezia, pain.    : No dysuria, hematuria, urgency, or frequency   HEME: No easy bruising, bleeding problems  PSYCHIATRIC: No depression, anxiety,  psychosis, hallucinations.  NEURO: No seizures, memory loss, dizziness or difficulty sleeping  MSK:  Positive per HPI    Past Medical History:   Diagnosis Date    Allergy     Arthritis     Depression     Hypertension     Lumbar radiculitis 8/2/2018     Past Surgical History:   Procedure Laterality Date    BREAST SURGERY      FRACTURE SURGERY      HERNIA REPAIR      SPINE SURGERY      TRANSFORAMINAL EPIDURAL INJECTION OF STEROID Bilateral 3/12/2019    Procedure: Injection,steroid,epidural,transforaminal approach L4-5;  Surgeon: Fidencio Vazquez MD;  Location: [facility] OR;  Service: Pain Management;  Laterality: Bilateral;    TRANSFORAMINAL EPIDURAL INJECTION OF STEROID Bilateral 8/9/2019    Procedure: Injection,steroid,epidural,transforaminal approach;  Surgeon: Fidencio Vazquez MD;  Location: [facility] OR;  Service: Pain Management;  Laterality: Bilateral;  L4-5    TRANSFORAMINAL EPIDURAL INJECTION OF STEROID Bilateral 11/15/2019    Procedure: Injection,steroid,epidural,transforaminal approach;  Surgeon: Fidencio Vazquez MD;  Location: [facility] OR;  Service: Pain Management;  Laterality: Bilateral;  L4-L5    TRANSFORAMINAL EPIDURAL INJECTION OF STEROID Bilateral 2/18/2020    Procedure: Injection,steroid,epidural,transforaminal approach;  Surgeon: Fidencio Vazquez MD;  Location: [facility] OR;  Service: Pain Management;  Laterality: Bilateral;  L4-5     History reviewed. No pertinent family history.  Social History     Socioeconomic History    Marital status:      Spouse name: Not on file    Number of children: Not on file    Years of education: Not on file    Highest education level: Not on file   Occupational History    Not on file   Social Needs    Financial resource strain: Not on file    Food insecurity:     Worry: Not on file     Inability: Not on file    Transportation needs:     Medical: Not on file     Non-medical: Not on file   Tobacco Use    Smoking status: Never Smoker    Smokeless tobacco: Never Used   Substance  "and Sexual Activity    Alcohol use: Yes     Alcohol/week: 1.0 standard drinks     Types: 1 Cans of beer per week    Drug use: No    Sexual activity: Yes   Lifestyle    Physical activity:     Days per week: Not on file     Minutes per session: Not on file    Stress: Not on file   Relationships    Social connections:     Talks on phone: Not on file     Gets together: Not on file     Attends [employer] service: Not on file     Active member of club or organization: Not on file     Attends meetings of clubs or organizations: Not on file     Relationship status: Not on file   Other Topics Concern    Not on file   Social History Narrative    Not on file       Medications/Allergies: See med card    Vitals:    03/17/20 1004   BP: 128/88   Pulse: 67   Weight: 81.6 kg (180 lb)   Height: 5' 9" (1.753 m)   PainSc:   5   PainLoc: Back       Physical exam:    GENERAL: A and O x3, the patient appears well groomed and is in no acute distress.  Skin: No rashes or obvious lesions  HEENT: normocephalic, atraumatic  CARDIOVASCULAR:  RRR  LUNGS: non labored breathing  ABDOMEN: soft, nontender   UPPER EXTREMITIES: Normal alignment, normal range of motion, no atrophy, no skin changes,  hair growth and nail growth normal and equal bilaterally. No swelling, no tenderness.    LOWER EXTREMITIES:  Normal alignment, normal range of motion, no atrophy, no skin changes,  hair growth and nail growth normal and equal bilaterally. No swelling, no tenderness.  CERVICAL SPINE:  Cervical spine: ROM is limited in flexion, extension and lateral rotation with moderate increased pain.  Spurling's maneuver causes no neck pain to either side.  Myofascial exam: No Tenderness to palpation across cervical paraspinous region bilaterally.    LUMBAR SPINE  Lumbar spine: ROM is full with flexion extension and oblique extension with moderate increased pain.    Darrel's test causes no increased pain on either side.    Supine straight leg raise is negative "   Internal and external rotation of the hip causes no increased pain on either side.  Myofascial exam: No tenderness to palpation across lumbar paraspinous muscles.      MENTAL STATUS: normal orientation, speech, language, and fund of knowledge for social situation.  Emotional state appropriate.    CRANIAL NERVES:  II:  PERRL bilaterally,   III,IV,VI: EOMI.    V:  Facial sensation equal bilaterally  VII:  Facial motor function normal.  VIII:  Hearing equal to finger rub bilaterally  IX/X: Gag normal, palate symmetric  XI:  Shoulder shrug equal, head turn equal  XII:  Tongue midline without fasciculations      MOTOR: Tone and bulk: normal bilateral upper and lower Strength: normal   Delt Bi Tri WE WF     R 5 5 5 5 5 5   L 5 5 5 5 5 5     IP ADD ABD Quad TA Gas HAM  R 5 5 5 5 5 5 5  L 5 5 5 5 5 5 5    SENSATION: Light touch and pinprick intact bilaterally  REFLEXES: normal, symmetric, nonbrisk.  Toes down, no clonus. No hoffmans.  GAIT:  Uses cane for assistance     Imaging:  MRI lumbar spine 01/2016 (outside records)  Lumbar spondylosis with small posterior disc protrusion L4-5 and L5-S1 with moderate bilateral neuroforaminal narrowing at L4-5.  Has postoperative changes of a laminotomy at L4-5 noted.    MRI cervical spine 05/2015  Postoperative changes of total disc arthroplasty at C4-5 anterior fusion with interbody spaces at C5-6 and C6-7.    Assessment:  Marshall Barboza is a 49 y.o. male with neck and lower back pain  1. Lumbar radiculitis    2. Postlaminectomy syndrome of lumbar region    3. DDD (degenerative disc disease), lumbar       Plan:  1. I have stressed the importance of physical activity and exercise to improve overall health  2. Monitor progress and consider repeat lumbar epidural steroid injection bilaterally at the L4-5 and L5-S1 levels.  3. May consider cervical DEMETRIA if neck pain does not improve in the future  4. He continue tramadol 50 mg q day as needed.  He takes this very  sparingly.  5. Voltaren Gel  6. Follow-up 3 months or sooner  All medication management was performed by Dr. Fidencio Vazquez

## 2020-04-14 ENCOUNTER — OFFICE VISIT (OUTPATIENT)
Dept: PAIN MEDICINE | Facility: CLINIC | Age: 50
End: 2020-04-14
Payer: OTHER GOVERNMENT

## 2020-04-14 DIAGNOSIS — M96.1 POSTLAMINECTOMY SYNDROME OF LUMBAR REGION: Primary | ICD-10-CM

## 2020-04-14 DIAGNOSIS — M54.16 LUMBAR RADICULITIS: ICD-10-CM

## 2020-04-14 DIAGNOSIS — M51.36 DDD (DEGENERATIVE DISC DISEASE), LUMBAR: ICD-10-CM

## 2020-04-14 PROCEDURE — 99443 PR PHYSICIAN TELEPHONE EVALUATION 21-30 MIN: ICD-10-PCS | Mod: 95,,, | Performed by: PHYSICIAN ASSISTANT

## 2020-04-14 PROCEDURE — 99443 PR PHYSICIAN TELEPHONE EVALUATION 21-30 MIN: CPT | Mod: 95,,, | Performed by: PHYSICIAN ASSISTANT

## 2020-04-14 NOTE — PROGRESS NOTES
Referring Physician: No ref. provider found    PCP: Sara Quan NP      CC:  Neck and lower back pain  Visit type: Virtual visit with synchronous audio  Total time spent with patient: 25 minutes  Each patient to whom he or she provides medical services by telemedicine is:  (1) informed of the relationship between the physician and patient and the respective role of any other health care provider with respect to management of the patient; and (2) notified that he or she may decline to receive medical services by telemedicine and may withdraw from such care at any time.  Patient is at home safe in [place]  Interval History:  Marshall Barboza is a 49 y.o. male with chronic neck and low back pain. He is s/p repeat  L4-5 TF DEMETRIA that provided 40-50% relief.   Denies worsening LE weakness. Denies b/b changes. He take Tramadol sparingly. Complains of neck pain with numbness in left hand. He has a history of C4-C7 ACDF.  Pain today is rated 5/10.     Prior HPI:   Marshall Barboza is a 49 y.o. male referred to us for neck and lower back pain.  Patient is a former active  personnel.  He has significant history of cervical and lumbar spine surgery.  He underwent L4-5 laminotomy and diskectomy in February 2015.  Radicular pain improved but he continues to have lower back pain.  He also was found to have cervical DDD.  He underwent a C4-C7 ACDF.  Lower back and right leg pain is more bothersome currently.  He has constant aching, throbbing pain in his lower back.  Pain radiates to down his right buttock into his right calf.  Pain occasionally travels down the left calf.  He has had lumbar MB RFA procedures in the past at the  with short-lived relief.  He has undergone lumbar ESIwith moderate benefit for 3 months.  He currently takes tramadol very sparingly with mild-to-moderate benefit.  He denies any worsening weakness.  No bowel bladder changes.    ROS:  CONSTITUTIONAL: No fevers, chills, night  sweats, wt. loss, appetite changes  SKIN: no rashes or itching  ENT: No headaches, head trauma, vision changes, or eye pain  LYMPH NODES: None noticed   CV: No chest pain, palpitations.   RESP: No shortness of breath, dyspnea on exertion, cough, wheezing, or hemoptysis  GI: No nausea, emesis, diarrhea, constipation, melena, hematochezia, pain.    : No dysuria, hematuria, urgency, or frequency   HEME: No easy bruising, bleeding problems  PSYCHIATRIC: No depression, anxiety, psychosis, hallucinations.  NEURO: No seizures, memory loss, dizziness or difficulty sleeping  MSK:  Positive per HPI    Past Medical History:   Diagnosis Date    Allergy     Arthritis     Depression     Hypertension     Lumbar radiculitis 8/2/2018     Past Surgical History:   Procedure Laterality Date    BREAST SURGERY      FRACTURE SURGERY      HERNIA REPAIR      SPINE SURGERY      TRANSFORAMINAL EPIDURAL INJECTION OF STEROID Bilateral 3/12/2019    Procedure: Injection,steroid,epidural,transforaminal approach L4-5;  Surgeon: Fidencio Vazquez MD;  Location: [facility] OR;  Service: Pain Management;  Laterality: Bilateral;    TRANSFORAMINAL EPIDURAL INJECTION OF STEROID Bilateral 8/9/2019    Procedure: Injection,steroid,epidural,transforaminal approach;  Surgeon: Fidencio Vazquez MD;  Location: [facility] OR;  Service: Pain Management;  Laterality: Bilateral;  L4-5    TRANSFORAMINAL EPIDURAL INJECTION OF STEROID Bilateral 11/15/2019    Procedure: Injection,steroid,epidural,transforaminal approach;  Surgeon: Fidencio Vazquez MD;  Location: [facility] OR;  Service: Pain Management;  Laterality: Bilateral;  L4-L5    TRANSFORAMINAL EPIDURAL INJECTION OF STEROID Bilateral 2/18/2020    Procedure: Injection,steroid,epidural,transforaminal approach;  Surgeon: Fidencio Vazquez MD;  Location: [facility] OR;  Service: Pain Management;  Laterality: Bilateral;  L4-5     No family history on file.  Social History     Socioeconomic History    Marital status:      Spouse name: Not on  file    Number of children: Not on file    Years of education: Not on file    Highest education level: Not on file   Occupational History    Not on file   Social Needs    Financial resource strain: Not on file    Food insecurity:     Worry: Not on file     Inability: Not on file    Transportation needs:     Medical: Not on file     Non-medical: Not on file   Tobacco Use    Smoking status: Never Smoker    Smokeless tobacco: Never Used   Substance and Sexual Activity    Alcohol use: Yes     Alcohol/week: 1.0 standard drinks     Types: 1 Cans of beer per week    Drug use: No    Sexual activity: Yes   Lifestyle    Physical activity:     Days per week: Not on file     Minutes per session: Not on file    Stress: Not on file   Relationships    Social connections:     Talks on phone: Not on file     Gets together: Not on file     Attends [employer] service: Not on file     Active member of club or organization: Not on file     Attends meetings of clubs or organizations: Not on file     Relationship status: Not on file   Other Topics Concern    Not on file   Social History Narrative    Not on file       Medications/Allergies: See med card  Pain score today is 5/10  Physical exam deferred due to lack of video feed    Imaging:  MRI lumbar spine 01/2016 (outside records)  Lumbar spondylosis with small posterior disc protrusion L4-5 and L5-S1 with moderate bilateral neuroforaminal narrowing at L4-5.  Has postoperative changes of a laminotomy at L4-5 noted.    MRI cervical spine 05/2015  Postoperative changes of total disc arthroplasty at C4-5 anterior fusion with interbody spaces at C5-6 and C6-7.    Assessment:  Marshall Barboza is a 49 y.o. male with neck and lower back pain  1. Postlaminectomy syndrome of lumbar region    2. DDD (degenerative disc disease), lumbar    3. Lumbar radiculitis       Plan:  1. I have stressed the importance of physical activity and exercise to improve overall health  2. Monitor  progress and consider repeat lumbar epidural steroid injection bilaterally at the L4-5 and L5-S1 levels.  3. May consider cervical DEMETRIA if neck pain does not improve in the future  4. He continue tramadol 50 mg q day as needed.  He takes this very sparingly.  5. Voltaren Gel  6. Follow-up 1 month or sooner for virtual visit.

## 2020-05-19 ENCOUNTER — OFFICE VISIT (OUTPATIENT)
Dept: PAIN MEDICINE | Facility: CLINIC | Age: 50
End: 2020-05-19
Payer: OTHER GOVERNMENT

## 2020-05-19 DIAGNOSIS — M54.16 LUMBAR RADICULITIS: ICD-10-CM

## 2020-05-19 DIAGNOSIS — M96.1 POSTLAMINECTOMY SYNDROME OF LUMBAR REGION: Primary | ICD-10-CM

## 2020-05-19 DIAGNOSIS — M51.36 DDD (DEGENERATIVE DISC DISEASE), LUMBAR: ICD-10-CM

## 2020-05-19 PROCEDURE — 99214 PR OFFICE/OUTPT VISIT, EST, LEVL IV, 30-39 MIN: ICD-10-PCS | Mod: 95,,, | Performed by: PHYSICIAN ASSISTANT

## 2020-05-19 PROCEDURE — 99214 OFFICE O/P EST MOD 30 MIN: CPT | Mod: 95,,, | Performed by: PHYSICIAN ASSISTANT

## 2020-05-19 RX ORDER — TRAMADOL HYDROCHLORIDE 50 MG/1
50 TABLET ORAL NIGHTLY
Qty: 30 TABLET | Refills: 0 | Status: SHIPPED | OUTPATIENT
Start: 2020-05-19 | End: 2020-07-22 | Stop reason: SDUPTHER

## 2020-05-19 NOTE — TELEPHONE ENCOUNTER
----- Message from Kenya Putnam PA-C sent at 5/19/2020 10:37 AM CDT -----  Tramadol 50 mg qhs prn. #30 Start today. No refills. F/u 1 month in clinic

## 2020-05-19 NOTE — PROGRESS NOTES
Referring Physician: No ref. provider found    PCP: Sara Quan NP      CC:  Neck and lower back pain  Visit type: Virtual visit with synchronous audio  Total time spent with patient: 25 minutes  Each patient to whom he or she provides medical services by telemedicine is:  (1) informed of the relationship between the physician and patient and the respective role of any other health care provider with respect to management of the patient; and (2) notified that he or she may decline to receive medical services by telemedicine   and may withdraw from such care at any time.  Patient is at home safe in [place]  Interval History:  Marshall Barboza is a 50 y.o. male with chronic neck and low back pain. He is s/p repeat  L4-5 TF DEMETRIA that provided 40-50% relief.   Denies worsening LE weakness. Denies b/b changes. He take Tramadol sparingly. Complains of neck pain with numbness in left hand. He has a history of C4-C7 ACDF.  Pain today is rated 6/10.     Prior HPI:   Marshall Barboza is a 50 y.o. male referred to us for neck and lower back pain.  Patient is a former active  personnel.  He has significant history of cervical and lumbar spine surgery.  He underwent L4-5 laminotomy and diskectomy in February 2015.  Radicular pain improved but he continues to have lower back pain.  He also was found to have cervical DDD.  He underwent a C4-C7 ACDF.  Lower back and right leg pain is more bothersome currently.  He has constant aching, throbbing pain in his lower back.  Pain radiates to down his right buttock into his right calf.  Pain occasionally travels down the left calf.  He has had lumbar MB RFA procedures in the past at the  with short-lived relief.  He has undergone lumbar ESIwith moderate benefit for 3 months.  He currently takes tramadol very sparingly with mild-to-moderate benefit.  He denies any worsening weakness.  No bowel bladder changes.    ROS:  CONSTITUTIONAL: No fevers, chills, night  sweats, wt. loss, appetite changes  SKIN: no rashes or itching  ENT: No headaches, head trauma, vision changes, or eye pain  LYMPH NODES: None noticed   CV: No chest pain, palpitations.   RESP: No shortness of breath, dyspnea on exertion, cough, wheezing, or hemoptysis  GI: No nausea, emesis, diarrhea, constipation, melena, hematochezia, pain.    : No dysuria, hematuria, urgency, or frequency   HEME: No easy bruising, bleeding problems  PSYCHIATRIC: No depression, anxiety, psychosis, hallucinations.  NEURO: No seizures, memory loss, dizziness or difficulty sleeping  MSK:  Positive per HPI    Past Medical History:   Diagnosis Date    Allergy     Arthritis     Depression     Hypertension     Lumbar radiculitis 8/2/2018     Past Surgical History:   Procedure Laterality Date    BREAST SURGERY      FRACTURE SURGERY      HERNIA REPAIR      SPINE SURGERY      TRANSFORAMINAL EPIDURAL INJECTION OF STEROID Bilateral 3/12/2019    Procedure: Injection,steroid,epidural,transforaminal approach L4-5;  Surgeon: Fidencio Vazquez MD;  Location: [facility] OR;  Service: Pain Management;  Laterality: Bilateral;    TRANSFORAMINAL EPIDURAL INJECTION OF STEROID Bilateral 8/9/2019    Procedure: Injection,steroid,epidural,transforaminal approach;  Surgeon: Fidencio Vazquez MD;  Location: [facility] OR;  Service: Pain Management;  Laterality: Bilateral;  L4-5    TRANSFORAMINAL EPIDURAL INJECTION OF STEROID Bilateral 11/15/2019    Procedure: Injection,steroid,epidural,transforaminal approach;  Surgeon: Fidencio Vazquez MD;  Location: [facility] OR;  Service: Pain Management;  Laterality: Bilateral;  L4-L5    TRANSFORAMINAL EPIDURAL INJECTION OF STEROID Bilateral 2/18/2020    Procedure: Injection,steroid,epidural,transforaminal approach;  Surgeon: Fidencio Vazquez MD;  Location: [facility] OR;  Service: Pain Management;  Laterality: Bilateral;  L4-5     No family history on file.  Social History     Socioeconomic History    Marital status:      Spouse name: Not on  file    Number of children: Not on file    Years of education: Not on file    Highest education level: Not on file   Occupational History    Not on file   Social Needs    Financial resource strain: Not on file    Food insecurity:     Worry: Not on file     Inability: Not on file    Transportation needs:     Medical: Not on file     Non-medical: Not on file   Tobacco Use    Smoking status: Never Smoker    Smokeless tobacco: Never Used   Substance and Sexual Activity    Alcohol use: Yes     Alcohol/week: 1.0 standard drinks     Types: 1 Cans of beer per week    Drug use: No    Sexual activity: Yes   Lifestyle    Physical activity:     Days per week: Not on file     Minutes per session: Not on file    Stress: Not on file   Relationships    Social connections:     Talks on phone: Not on file     Gets together: Not on file     Attends Buddhism service: Not on file     Active member of club or organization: Not on file     Attends meetings of clubs or organizations: Not on file     Relationship status: Not on file   Other Topics Concern    Not on file   Social History Narrative    Not on file       Medications/Allergies: See med card  Pain score today is 6/10      GENERAL: A and O x3, the patient appears well groomed and is in no acute distress.  Skin: No rashes or obvious lesions  HEENT: normocephalic, atraumatic  LUNGS: non labored breathing  ABDOMEN: non distended  UPPER EXTREMITIES and lower extremities. Normal ROM  CERVICAL SPINE:  Cervical spine: ROM is full in flexion, extension and lateral rotation without increased pain.    LUMBAR SPINE  Lumbar spine: ROM is limited with flexion extension and oblique extension with mild-to-moderate increased pain.      MENTAL STATUS: normal orientation, speech, language, and fund of knowledge for social situation.  Emotional state appropriate.    GAIT: normal rise, base, steps, and arm swing.      Imaging:  MRI lumbar spine 01/2016 (outside records)  Lumbar  spondylosis with small posterior disc protrusion L4-5 and L5-S1 with moderate bilateral neuroforaminal narrowing at L4-5.  Has postoperative changes of a laminotomy at L4-5 noted.    MRI cervical spine 05/2015  Postoperative changes of total disc arthroplasty at C4-5 anterior fusion with interbody spaces at C5-6 and C6-7.    Assessment:  Marshall Barboza is a 50 y.o. male with neck and lower back pain  1. Postlaminectomy syndrome of lumbar region    2. DDD (degenerative disc disease), lumbar    3. Lumbar radiculitis       Plan:  1. I have stressed the importance of physical activity and exercise to improve overall health  2. Monitor progress and consider repeat lumbar epidural steroid injection bilaterally at the L4-5 and L5-S1 levels.  3. May consider cervical DEMETRIA if neck pain does not improve in the future  4. He continue tramadol 50 mg q day as needed.  He takes this very sparingly.  5. Voltaren Gel  6. Follow-up 1 month or sooner in clinic   Medication management by Dr. Wells in Dr. Vazquez's absence

## 2020-07-22 ENCOUNTER — OFFICE VISIT (OUTPATIENT)
Dept: PAIN MEDICINE | Facility: CLINIC | Age: 50
End: 2020-07-22
Payer: OTHER GOVERNMENT

## 2020-07-22 VITALS
WEIGHT: 180 LBS | DIASTOLIC BLOOD PRESSURE: 85 MMHG | HEART RATE: 71 BPM | BODY MASS INDEX: 26.66 KG/M2 | HEIGHT: 69 IN | SYSTOLIC BLOOD PRESSURE: 128 MMHG

## 2020-07-22 DIAGNOSIS — M54.16 LUMBAR RADICULITIS: Primary | ICD-10-CM

## 2020-07-22 DIAGNOSIS — M54.16 LUMBAR RADICULITIS: ICD-10-CM

## 2020-07-22 DIAGNOSIS — Z01.818 PRE-OP TESTING: ICD-10-CM

## 2020-07-22 DIAGNOSIS — M51.36 DDD (DEGENERATIVE DISC DISEASE), LUMBAR: ICD-10-CM

## 2020-07-22 DIAGNOSIS — M96.1 POSTLAMINECTOMY SYNDROME OF LUMBAR REGION: Primary | ICD-10-CM

## 2020-07-22 PROCEDURE — 99214 OFFICE O/P EST MOD 30 MIN: CPT | Mod: PBBFAC,PN | Performed by: PHYSICIAN ASSISTANT

## 2020-07-22 PROCEDURE — 99214 PR OFFICE/OUTPT VISIT, EST, LEVL IV, 30-39 MIN: ICD-10-PCS | Mod: S$PBB,,, | Performed by: PHYSICIAN ASSISTANT

## 2020-07-22 PROCEDURE — 99214 OFFICE O/P EST MOD 30 MIN: CPT | Mod: S$PBB,,, | Performed by: PHYSICIAN ASSISTANT

## 2020-07-22 PROCEDURE — 99999 PR PBB SHADOW E&M-EST. PATIENT-LVL IV: ICD-10-PCS | Mod: PBBFAC,,, | Performed by: PHYSICIAN ASSISTANT

## 2020-07-22 PROCEDURE — 99999 PR PBB SHADOW E&M-EST. PATIENT-LVL IV: CPT | Mod: PBBFAC,,, | Performed by: PHYSICIAN ASSISTANT

## 2020-07-22 RX ORDER — TRAMADOL HYDROCHLORIDE 50 MG/1
50 TABLET ORAL NIGHTLY
Qty: 30 TABLET | Refills: 1 | Status: SHIPPED | OUTPATIENT
Start: 2020-07-22 | End: 2020-08-21

## 2020-07-22 NOTE — PROGRESS NOTES
Referring Physician: No ref. provider found    PCP: Sara Quan NP      CC:  Neck and lower back pain    Interval History:  Marshall Barboza is a 50 y.o. male with chronic neck and low back pain. He is s/p repeat  L4-5 TF DEMETRIA that provided 40-50% relief in February that provided good relief. Pain has returned to baseline.   Denies worsening LE weakness. Denies b/b changes. He take Tramadol sparingly. Complains of neck pain with numbness in left hand. He has a history of C4-C7 ACDF.  Pain today is rated 6/10.     Prior HPI:   Marshall Barboza is a 50 y.o. male referred to us for neck and lower back pain.  Patient is a former active  personnel.  He has significant history of cervical and lumbar spine surgery.  He underwent L4-5 laminotomy and diskectomy in February 2015.  Radicular pain improved but he continues to have lower back pain.  He also was found to have cervical DDD.  He underwent a C4-C7 ACDF.  Lower back and right leg pain is more bothersome currently.  He has constant aching, throbbing pain in his lower back.  Pain radiates to down his right buttock into his right calf.  Pain occasionally travels down the left calf.  He has had lumbar MB RFA procedures in the past at the  with short-lived relief.  He has undergone lumbar ESIwith moderate benefit for 3 months.  He currently takes tramadol very sparingly with mild-to-moderate benefit.  He denies any worsening weakness.  No bowel bladder changes.    ROS:  CONSTITUTIONAL: No fevers, chills, night sweats, wt. loss, appetite changes  SKIN: no rashes or itching  ENT: No headaches, head trauma, vision changes, or eye pain  LYMPH NODES: None noticed   CV: No chest pain, palpitations.   RESP: No shortness of breath, dyspnea on exertion, cough, wheezing, or hemoptysis  GI: No nausea, emesis, diarrhea, constipation, melena, hematochezia, pain.    : No dysuria, hematuria, urgency, or frequency   HEME: No easy bruising, bleeding  problems  PSYCHIATRIC: No depression, anxiety, psychosis, hallucinations.  NEURO: No seizures, memory loss, dizziness or difficulty sleeping  MSK:  Positive per HPI    Past Medical History:   Diagnosis Date    Allergy     Arthritis     Depression     Hypertension     Lumbar radiculitis 8/2/2018     Past Surgical History:   Procedure Laterality Date    BREAST SURGERY      FRACTURE SURGERY      HERNIA REPAIR      SPINE SURGERY      TRANSFORAMINAL EPIDURAL INJECTION OF STEROID Bilateral 3/12/2019    Procedure: Injection,steroid,epidural,transforaminal approach L4-5;  Surgeon: Fidencio Vazquez MD;  Location: [facility] OR;  Service: Pain Management;  Laterality: Bilateral;    TRANSFORAMINAL EPIDURAL INJECTION OF STEROID Bilateral 8/9/2019    Procedure: Injection,steroid,epidural,transforaminal approach;  Surgeon: Fidencio Vazquez MD;  Location: [facility] OR;  Service: Pain Management;  Laterality: Bilateral;  L4-5    TRANSFORAMINAL EPIDURAL INJECTION OF STEROID Bilateral 11/15/2019    Procedure: Injection,steroid,epidural,transforaminal approach;  Surgeon: Fidencio Vazquez MD;  Location: [facility] OR;  Service: Pain Management;  Laterality: Bilateral;  L4-L5    TRANSFORAMINAL EPIDURAL INJECTION OF STEROID Bilateral 2/18/2020    Procedure: Injection,steroid,epidural,transforaminal approach;  Surgeon: Fidencio Vazquez MD;  Location: [facility] OR;  Service: Pain Management;  Laterality: Bilateral;  L4-5     History reviewed. No pertinent family history.  Social History     Socioeconomic History    Marital status:      Spouse name: Not on file    Number of children: Not on file    Years of education: Not on file    Highest education level: Not on file   Occupational History    Not on file   Social Needs    Financial resource strain: Not on file    Food insecurity     Worry: Not on file     Inability: Not on file    Transportation needs     Medical: Not on file     Non-medical: Not on file   Tobacco Use    Smoking status: Never Smoker  "   Smokeless tobacco: Never Used   Substance and Sexual Activity    Alcohol use: Yes     Alcohol/week: 1.0 standard drinks     Types: 1 Cans of beer per week    Drug use: No    Sexual activity: Yes   Lifestyle    Physical activity     Days per week: Not on file     Minutes per session: Not on file    Stress: Not on file   Relationships    Social connections     Talks on phone: Not on file     Gets together: Not on file     Attends Baptist service: Not on file     Active member of club or organization: Not on file     Attends meetings of clubs or organizations: Not on file     Relationship status: Not on file   Other Topics Concern    Not on file   Social History Narrative    Not on file       Medications/Allergies: See med card    Vitals:    07/22/20 1303   BP: 128/85   Pulse: 71   Weight: 81.6 kg (180 lb)   Height: 5' 9" (1.753 m)   PainSc:   6   PainLoc: Back       Physical exam:    GENERAL: A and O x3, the patient appears well groomed and is in no acute distress.  Skin: No rashes or obvious lesions  HEENT: normocephalic, atraumatic  CARDIOVASCULAR:  RRR  LUNGS: non labored breathing  ABDOMEN: soft, nontender   UPPER EXTREMITIES: Normal alignment, normal range of motion, no atrophy, no skin changes,  hair growth and nail growth normal and equal bilaterally. No swelling, no tenderness.    LOWER EXTREMITIES:  Normal alignment, normal range of motion, no atrophy, no skin changes,  hair growth and nail growth normal and equal bilaterally. No swelling, no tenderness.  CERVICAL SPINE:  Cervical spine: ROM is limited in flexion, extension and lateral rotation with moderate increased pain.  Spurling's maneuver causes no neck pain to either side.  Myofascial exam: No Tenderness to palpation across cervical paraspinous region bilaterally.    LUMBAR SPINE  Lumbar spine: ROM is full with flexion extension and oblique extension with moderate increased pain.    Darrel's test causes no increased pain on either side.  "   Supine straight leg raise is negative   Internal and external rotation of the hip causes no increased pain on either side.  Myofascial exam: No tenderness to palpation across lumbar paraspinous muscles.      MENTAL STATUS: normal orientation, speech, language, and fund of knowledge for social situation.  Emotional state appropriate.    CRANIAL NERVES:  II:  PERRL bilaterally,   III,IV,VI: EOMI.    V:  Facial sensation equal bilaterally  VII:  Facial motor function normal.  VIII:  Hearing equal to finger rub bilaterally  IX/X: Gag normal, palate symmetric  XI:  Shoulder shrug equal, head turn equal  XII:  Tongue midline without fasciculations      MOTOR: Tone and bulk: normal bilateral upper and lower Strength: normal   Delt Bi Tri WE WF     R 5 5 5 5 5 5   L 5 5 5 5 5 5     IP ADD ABD Quad TA Gas HAM  R 5 5 5 5 5 5 5  L 5 5 5 5 5 5 5    SENSATION: Light touch and pinprick intact bilaterally  REFLEXES: normal, symmetric, nonbrisk.  Toes down, no clonus. No hoffmans.  GAIT:  Uses cane for assistance     Imaging:  MRI lumbar spine 01/2016 (outside records)  Lumbar spondylosis with small posterior disc protrusion L4-5 and L5-S1 with moderate bilateral neuroforaminal narrowing at L4-5.  Has postoperative changes of a laminotomy at L4-5 noted.    MRI cervical spine 05/2015  Postoperative changes of total disc arthroplasty at C4-5 anterior fusion with interbody spaces at C5-6 and C6-7.    Assessment:  Marshall Barboza is a 50 y.o. male with neck and lower back pain  1. Postlaminectomy syndrome of lumbar region    2. Lumbar radiculitis    3. DDD (degenerative disc disease), lumbar         Plan:  1. I have stressed the importance of physical activity and exercise to improve overall health  2. Schedule repeat lumbar epidural steroid injection bilaterally at the L4-5 and L5-S1 levels.  3. May consider cervical DEMETRIA if neck pain does not improve in the future  4. He continue tramadol 50 mg q day as needed.  He takes  this very sparingly.  5.  Follow-up 3 months or sooner  All medication management was performed by Dr. Fidencio Vazquez

## 2020-07-22 NOTE — H&P (VIEW-ONLY)
Referring Physician: No ref. provider found    PCP: Sara Quan NP      CC:  Neck and lower back pain    Interval History:  Marshall Barboza is a 50 y.o. male with chronic neck and low back pain. He is s/p repeat  L4-5 TF DEMETRIA that provided 40-50% relief in February that provided good relief. Pain has returned to baseline.   Denies worsening LE weakness. Denies b/b changes. He take Tramadol sparingly. Complains of neck pain with numbness in left hand. He has a history of C4-C7 ACDF.  Pain today is rated 6/10.     Prior HPI:   Marshall Barboza is a 50 y.o. male referred to us for neck and lower back pain.  Patient is a former active  personnel.  He has significant history of cervical and lumbar spine surgery.  He underwent L4-5 laminotomy and diskectomy in February 2015.  Radicular pain improved but he continues to have lower back pain.  He also was found to have cervical DDD.  He underwent a C4-C7 ACDF.  Lower back and right leg pain is more bothersome currently.  He has constant aching, throbbing pain in his lower back.  Pain radiates to down his right buttock into his right calf.  Pain occasionally travels down the left calf.  He has had lumbar MB RFA procedures in the past at the  with short-lived relief.  He has undergone lumbar ESIwith moderate benefit for 3 months.  He currently takes tramadol very sparingly with mild-to-moderate benefit.  He denies any worsening weakness.  No bowel bladder changes.    ROS:  CONSTITUTIONAL: No fevers, chills, night sweats, wt. loss, appetite changes  SKIN: no rashes or itching  ENT: No headaches, head trauma, vision changes, or eye pain  LYMPH NODES: None noticed   CV: No chest pain, palpitations.   RESP: No shortness of breath, dyspnea on exertion, cough, wheezing, or hemoptysis  GI: No nausea, emesis, diarrhea, constipation, melena, hematochezia, pain.    : No dysuria, hematuria, urgency, or frequency   HEME: No easy bruising, bleeding  problems  PSYCHIATRIC: No depression, anxiety, psychosis, hallucinations.  NEURO: No seizures, memory loss, dizziness or difficulty sleeping  MSK:  Positive per HPI    Past Medical History:   Diagnosis Date    Allergy     Arthritis     Depression     Hypertension     Lumbar radiculitis 8/2/2018     Past Surgical History:   Procedure Laterality Date    BREAST SURGERY      FRACTURE SURGERY      HERNIA REPAIR      SPINE SURGERY      TRANSFORAMINAL EPIDURAL INJECTION OF STEROID Bilateral 3/12/2019    Procedure: Injection,steroid,epidural,transforaminal approach L4-5;  Surgeon: Fidencio Vazquez MD;  Location: [facility] OR;  Service: Pain Management;  Laterality: Bilateral;    TRANSFORAMINAL EPIDURAL INJECTION OF STEROID Bilateral 8/9/2019    Procedure: Injection,steroid,epidural,transforaminal approach;  Surgeon: Fidencio Vazquez MD;  Location: [facility] OR;  Service: Pain Management;  Laterality: Bilateral;  L4-5    TRANSFORAMINAL EPIDURAL INJECTION OF STEROID Bilateral 11/15/2019    Procedure: Injection,steroid,epidural,transforaminal approach;  Surgeon: Fidencio Vazquez MD;  Location: [facility] OR;  Service: Pain Management;  Laterality: Bilateral;  L4-L5    TRANSFORAMINAL EPIDURAL INJECTION OF STEROID Bilateral 2/18/2020    Procedure: Injection,steroid,epidural,transforaminal approach;  Surgeon: Fidencio Vazquez MD;  Location: [facility] OR;  Service: Pain Management;  Laterality: Bilateral;  L4-5     History reviewed. No pertinent family history.  Social History     Socioeconomic History    Marital status:      Spouse name: Not on file    Number of children: Not on file    Years of education: Not on file    Highest education level: Not on file   Occupational History    Not on file   Social Needs    Financial resource strain: Not on file    Food insecurity     Worry: Not on file     Inability: Not on file    Transportation needs     Medical: Not on file     Non-medical: Not on file   Tobacco Use    Smoking status: Never Smoker  "   Smokeless tobacco: Never Used   Substance and Sexual Activity    Alcohol use: Yes     Alcohol/week: 1.0 standard drinks     Types: 1 Cans of beer per week    Drug use: No    Sexual activity: Yes   Lifestyle    Physical activity     Days per week: Not on file     Minutes per session: Not on file    Stress: Not on file   Relationships    Social connections     Talks on phone: Not on file     Gets together: Not on file     Attends [employer] service: Not on file     Active member of club or organization: Not on file     Attends meetings of clubs or organizations: Not on file     Relationship status: Not on file   Other Topics Concern    Not on file   Social History Narrative    Not on file       Medications/Allergies: See med card    Vitals:    07/22/20 1303   BP: 128/85   Pulse: 71   Weight: 81.6 kg (180 lb)   Height: 5' 9" (1.753 m)   PainSc:   6   PainLoc: Back       Physical exam:    GENERAL: A and O x3, the patient appears well groomed and is in no acute distress.  Skin: No rashes or obvious lesions  HEENT: normocephalic, atraumatic  CARDIOVASCULAR:  RRR  LUNGS: non labored breathing  ABDOMEN: soft, nontender   UPPER EXTREMITIES: Normal alignment, normal range of motion, no atrophy, no skin changes,  hair growth and nail growth normal and equal bilaterally. No swelling, no tenderness.    LOWER EXTREMITIES:  Normal alignment, normal range of motion, no atrophy, no skin changes,  hair growth and nail growth normal and equal bilaterally. No swelling, no tenderness.  CERVICAL SPINE:  Cervical spine: ROM is limited in flexion, extension and lateral rotation with moderate increased pain.  Spurling's maneuver causes no neck pain to either side.  Myofascial exam: No Tenderness to palpation across cervical paraspinous region bilaterally.    LUMBAR SPINE  Lumbar spine: ROM is full with flexion extension and oblique extension with moderate increased pain.    Darrel's test causes no increased pain on either side.  "   Supine straight leg raise is negative   Internal and external rotation of the hip causes no increased pain on either side.  Myofascial exam: No tenderness to palpation across lumbar paraspinous muscles.      MENTAL STATUS: normal orientation, speech, language, and fund of knowledge for social situation.  Emotional state appropriate.    CRANIAL NERVES:  II:  PERRL bilaterally,   III,IV,VI: EOMI.    V:  Facial sensation equal bilaterally  VII:  Facial motor function normal.  VIII:  Hearing equal to finger rub bilaterally  IX/X: Gag normal, palate symmetric  XI:  Shoulder shrug equal, head turn equal  XII:  Tongue midline without fasciculations      MOTOR: Tone and bulk: normal bilateral upper and lower Strength: normal   Delt Bi Tri WE WF     R 5 5 5 5 5 5   L 5 5 5 5 5 5     IP ADD ABD Quad TA Gas HAM  R 5 5 5 5 5 5 5  L 5 5 5 5 5 5 5    SENSATION: Light touch and pinprick intact bilaterally  REFLEXES: normal, symmetric, nonbrisk.  Toes down, no clonus. No hoffmans.  GAIT:  Uses cane for assistance     Imaging:  MRI lumbar spine 01/2016 (outside records)  Lumbar spondylosis with small posterior disc protrusion L4-5 and L5-S1 with moderate bilateral neuroforaminal narrowing at L4-5.  Has postoperative changes of a laminotomy at L4-5 noted.    MRI cervical spine 05/2015  Postoperative changes of total disc arthroplasty at C4-5 anterior fusion with interbody spaces at C5-6 and C6-7.    Assessment:  Marshall Barboza is a 50 y.o. male with neck and lower back pain  1. Postlaminectomy syndrome of lumbar region    2. Lumbar radiculitis    3. DDD (degenerative disc disease), lumbar         Plan:  1. I have stressed the importance of physical activity and exercise to improve overall health  2. Schedule repeat lumbar epidural steroid injection bilaterally at the L4-5 and L5-S1 levels.  3. May consider cervical DEMETRIA if neck pain does not improve in the future  4. He continue tramadol 50 mg q day as needed.  He takes  this very sparingly.  5.  Follow-up 3 months or sooner  All medication management was performed by Dr. Fidencio Vazquez

## 2020-07-26 ENCOUNTER — LAB VISIT (OUTPATIENT)
Dept: PRIMARY CARE CLINIC | Facility: CLINIC | Age: 50
End: 2020-07-26
Payer: OTHER GOVERNMENT

## 2020-07-26 DIAGNOSIS — Z01.818 PRE-OP TESTING: ICD-10-CM

## 2020-07-26 PROCEDURE — U0003 INFECTIOUS AGENT DETECTION BY NUCLEIC ACID (DNA OR RNA); SEVERE ACUTE RESPIRATORY SYNDROME CORONAVIRUS 2 (SARS-COV-2) (CORONAVIRUS DISEASE [COVID-19]), AMPLIFIED PROBE TECHNIQUE, MAKING USE OF HIGH THROUGHPUT TECHNOLOGIES AS DESCRIBED BY CMS-2020-01-R: HCPCS

## 2020-07-27 LAB — SARS-COV-2 RNA RESP QL NAA+PROBE: NOT DETECTED

## 2020-07-29 ENCOUNTER — HOSPITAL ENCOUNTER (OUTPATIENT)
Facility: AMBULARY SURGERY CENTER | Age: 50
Discharge: HOME | End: 2020-07-29
Attending: ANESTHESIOLOGY | Admitting: ANESTHESIOLOGY
Payer: OTHER GOVERNMENT

## 2020-07-29 DIAGNOSIS — M54.16 LUMBAR RADICULITIS: Primary | ICD-10-CM

## 2020-07-29 PROCEDURE — 64483 NJX AA&/STRD TFRM EPI L/S 1: CPT | Mod: RT | Performed by: ANESTHESIOLOGY

## 2020-07-29 PROCEDURE — 64483 NJX AA&/STRD TFRM EPI L/S 1: CPT | Mod: 50,,, | Performed by: ANESTHESIOLOGY

## 2020-07-29 PROCEDURE — 64483 PR EPIDURAL INJ, ANES/STEROID, TRANSFORAMINAL, LUMB/SACR, SNGL LEVL: ICD-10-PCS | Mod: 50,,, | Performed by: ANESTHESIOLOGY

## 2020-07-29 RX ORDER — DEXAMETHASONE SODIUM PHOSPHATE 10 MG/ML
INJECTION INTRAMUSCULAR; INTRAVENOUS
Status: DISCONTINUED | OUTPATIENT
Start: 2020-07-29 | End: 2020-07-29 | Stop reason: HOSPADM

## 2020-07-29 RX ORDER — SODIUM CHLORIDE, SODIUM LACTATE, POTASSIUM CHLORIDE, CALCIUM CHLORIDE 600; 310; 30; 20 MG/100ML; MG/100ML; MG/100ML; MG/100ML
INJECTION, SOLUTION INTRAVENOUS ONCE AS NEEDED
Status: DISCONTINUED | OUTPATIENT
Start: 2020-07-29 | End: 2020-07-29 | Stop reason: HOSPADM

## 2020-07-29 RX ORDER — BUPIVACAINE HYDROCHLORIDE 2.5 MG/ML
INJECTION, SOLUTION EPIDURAL; INFILTRATION; INTRACAUDAL
Status: DISCONTINUED | OUTPATIENT
Start: 2020-07-29 | End: 2020-07-29 | Stop reason: HOSPADM

## 2020-07-29 RX ORDER — LIDOCAINE HYDROCHLORIDE 10 MG/ML
INJECTION, SOLUTION EPIDURAL; INFILTRATION; INTRACAUDAL; PERINEURAL
Status: DISCONTINUED | OUTPATIENT
Start: 2020-07-29 | End: 2020-07-29 | Stop reason: HOSPADM

## 2020-07-29 NOTE — OP NOTE
PROCEDURE DATE: 7/29/2020    PROCEDURE: Bilateral L4-5 transforaminal epidural steroid injection under fluoroscopy    DIAGNOSIS: Lumbar disc displacement without myelopathy  Post op diagnosis: Same    PHYSICIAN: Fidencio Vazquez MD    MEDICATIONS INJECTED:  Dexamethasone 5mg (0.5ml) and 1.5ml 0.25% bupivicaine at each nerve root.     LOCAL ANESTHETIC INJECTED:  Lidocaine 1%. 2 ml per site.    SEDATION MEDICATIONS: RN IV sedation    ESTIMATED BLOOD LOSS:  None    COMPLICATIONS:  None    TECHNIQUE:   A time-out was taken to identify patient and procedure side prior to starting the procedure. The patient was placed in a prone position, prepped and draped in the usual sterile fashion using ChloraPrep and sterile towels.  The area to be injected was determined under fluoroscopic guidance in AP and oblique view.  Local anesthetic was given by raising a wheal and going down to the hub of a 25-gauge 1.5 inch needle.  In oblique view, a 3.5 inch 22-gauge bent-tip spinal needle was introduced towards 6 oclock position of the pedicle of each above named nerve root level.  The needle was walked medially then hinged into the neural foramen and position was confirmed in AP and lateral views.  1ml contrast dye was injected to confirm appropriate placement and that there was no vascular uptake.  After negative aspiration for blood or CSF, the medication was then injected. This was performed at the bilateral L4-5 level(s). The patient tolerated the procedure well.    The patient was monitored after the procedure.  Patient was given post procedure and discharge instructions to follow at home. The patient was discharged in a stable condition.

## 2020-07-29 NOTE — DISCHARGE SUMMARY
[facility]  Discharge Note  Short Stay    Admit Date: 7/29/2020    Discharge Date and Time: 7/29/2020    Attending Physician: Fidencio Vazquez MD     Discharge Provider: Fidencio Vazquez    Diagnoses:  Active Hospital Problems    Diagnosis  POA    *Lumbar radiculitis [M54.16]  Yes      Resolved Hospital Problems   No resolved problems to display.       Hospital Course: Lumbar DEMETRIA  Discharged Condition: Good    Final Diagnoses:   Active Hospital Problems    Diagnosis  POA    *Lumbar radiculitis [M54.16]  Yes      Resolved Hospital Problems   No resolved problems to display.       Disposition: Home or Self Care    Follow up/Patient Instructions:    Medications:  Reconciled Home Medications:      Medication List      CONTINUE taking these medications    acyclovir 800 MG Tab  Commonly known as: ZOVIRAX     amLODIPine 10 MG tablet  Commonly known as: NORVASC     diclofenac sodium 1 % Gel  Commonly known as: VOLTAREN  Apply 2 g topically once daily.     fluticasone propionate 50 mcg/actuation nasal spray  Commonly known as: FLONASE     hydroCHLOROthiazide 25 MG tablet  Commonly known as: HYDRODIURIL  Take 25 mg by mouth once daily.     ibuprofen 800 MG tablet  Commonly known as: ADVIL,MOTRIN  Take 800 mg by mouth 3 (three) times daily.     meloxicam 15 MG tablet  Commonly known as: MOBIC     methocarbamoL 500 MG Tab  Commonly known as: ROBAXIN     topiramate 50 MG tablet  Commonly known as: TOPAMAX     traMADoL 50 mg tablet  Commonly known as: ULTRAM  Take 1 tablet (50 mg total) by mouth every evening. Medically necessary for greater than 7 days for chronic pain          Discharge Procedure Orders   Call MD for:  temperature >100.4     Call MD for:  persistent nausea and vomiting or diarrhea     Call MD for:  severe uncontrolled pain     Call MD for:  redness, tenderness, or signs of infection (pain, swelling, redness, odor or green/yellow discharge around incision site)     Call MD for:  difficulty breathing or  increased cough     Call MD for:  severe persistent headache        Follow up with MD in 2-3 weeks    Discharge Procedure Orders (must include Diet, Follow-up, Activity):   Discharge Procedure Orders (must include Diet, Follow-up, Activity)   Call MD for:  temperature >100.4     Call MD for:  persistent nausea and vomiting or diarrhea     Call MD for:  severe uncontrolled pain     Call MD for:  redness, tenderness, or signs of infection (pain, swelling, redness, odor or green/yellow discharge around incision site)     Call MD for:  difficulty breathing or increased cough     Call MD for:  severe persistent headache

## 2020-07-29 NOTE — PLAN OF CARE
Patient awake alert and standing up; able to ambulate without assistance. Patient states ready togo home. Patient denies pain, nausea weakness or dizziness. Vital signs and injection sites stable. All belongings listed on pre op check list have been returned to patient. Patient's spouse Vera present and states she is ready to take pt home and she is driving the patient home.

## 2020-07-30 VITALS
WEIGHT: 180 LBS | HEART RATE: 60 BPM | RESPIRATION RATE: 16 BRPM | HEIGHT: 69 IN | TEMPERATURE: 98 F | SYSTOLIC BLOOD PRESSURE: 130 MMHG | DIASTOLIC BLOOD PRESSURE: 93 MMHG | OXYGEN SATURATION: 99 % | BODY MASS INDEX: 26.66 KG/M2

## 2020-10-06 ENCOUNTER — PATIENT MESSAGE (OUTPATIENT)
Dept: RESEARCH | Facility: OTHER | Age: 50
End: 2020-10-06

## 2020-10-22 ENCOUNTER — OFFICE VISIT (OUTPATIENT)
Dept: PAIN MEDICINE | Facility: CLINIC | Age: 50
End: 2020-10-22
Payer: OTHER GOVERNMENT

## 2020-10-22 VITALS
SYSTOLIC BLOOD PRESSURE: 129 MMHG | BODY MASS INDEX: 26.66 KG/M2 | DIASTOLIC BLOOD PRESSURE: 84 MMHG | HEIGHT: 69 IN | HEART RATE: 69 BPM | WEIGHT: 180 LBS

## 2020-10-22 DIAGNOSIS — M54.16 LUMBAR RADICULITIS: Primary | ICD-10-CM

## 2020-10-22 DIAGNOSIS — M51.36 DDD (DEGENERATIVE DISC DISEASE), LUMBAR: ICD-10-CM

## 2020-10-22 DIAGNOSIS — G89.4 CHRONIC PAIN DISORDER: ICD-10-CM

## 2020-10-22 DIAGNOSIS — M96.1 POSTLAMINECTOMY SYNDROME OF LUMBAR REGION: ICD-10-CM

## 2020-10-22 DIAGNOSIS — M50.30 DDD (DEGENERATIVE DISC DISEASE), CERVICAL: ICD-10-CM

## 2020-10-22 PROCEDURE — 99214 OFFICE O/P EST MOD 30 MIN: CPT | Mod: S$PBB,,, | Performed by: PHYSICIAN ASSISTANT

## 2020-10-22 PROCEDURE — 99214 OFFICE O/P EST MOD 30 MIN: CPT | Mod: PBBFAC,PN | Performed by: PHYSICIAN ASSISTANT

## 2020-10-22 PROCEDURE — 99999 PR PBB SHADOW E&M-EST. PATIENT-LVL IV: ICD-10-PCS | Mod: PBBFAC,,, | Performed by: PHYSICIAN ASSISTANT

## 2020-10-22 PROCEDURE — 99999 PR PBB SHADOW E&M-EST. PATIENT-LVL IV: CPT | Mod: PBBFAC,,, | Performed by: PHYSICIAN ASSISTANT

## 2020-10-22 PROCEDURE — 99214 PR OFFICE/OUTPT VISIT, EST, LEVL IV, 30-39 MIN: ICD-10-PCS | Mod: S$PBB,,, | Performed by: PHYSICIAN ASSISTANT

## 2020-10-22 RX ORDER — TRAMADOL HYDROCHLORIDE 50 MG/1
TABLET ORAL
COMMUNITY
Start: 2020-09-06 | End: 2021-01-11 | Stop reason: SDUPTHER

## 2020-10-22 RX ORDER — TRAMADOL HYDROCHLORIDE 50 MG/1
50 TABLET ORAL EVERY 8 HOURS PRN
Qty: 30 TABLET | Refills: 2 | Status: SHIPPED | OUTPATIENT
Start: 2020-10-22 | End: 2020-11-21

## 2020-10-22 RX ORDER — KETOTIFEN FUMARATE 0.35 MG/ML
SOLUTION/ DROPS OPHTHALMIC
COMMUNITY
Start: 2020-09-16

## 2020-10-22 RX ORDER — CARBOXYMETHYLCELLULOSE SODIUM 10 MG/ML
GEL OPHTHALMIC
COMMUNITY
Start: 2020-09-09

## 2020-10-22 RX ORDER — KETOTIFEN FUMARATE 0.35 MG/ML
SOLUTION/ DROPS OPHTHALMIC
COMMUNITY
Start: 2020-09-16 | End: 2023-06-28

## 2020-10-22 RX ORDER — LIDOCAINE 50 MG/G
PATCH TOPICAL
COMMUNITY
Start: 2020-09-08

## 2020-10-22 NOTE — PROGRESS NOTES
Referring Physician: No ref. provider found    PCP: Sara Quan NP      CC:  Neck and lower back pain    Interval History:  Marshall Barboza is a 50 y.o. male with chronic neck and low back pain. He is s/p repeat  L4-5 TF DEMETRIA that provided 40-50% relief in July. Pain has returned to baseline.   Denies worsening LE weakness. Denies b/b changes. He take Tramadol sparingly. Complains of neck pain with numbness in left hand. He has a history of C4-C7 ACDF.  Pain today is rated 7/10.     Prior HPI:   Marshall Barboza is a 50 y.o. male referred to us for neck and lower back pain.  Patient is a former active  personnel.  He has significant history of cervical and lumbar spine surgery.  He underwent L4-5 laminotomy and diskectomy in February 2015.  Radicular pain improved but he continues to have lower back pain.  He also was found to have cervical DDD.  He underwent a C4-C7 ACDF.  Lower back and right leg pain is more bothersome currently.  He has constant aching, throbbing pain in his lower back.  Pain radiates to down his right buttock into his right calf.  Pain occasionally travels down the left calf.  He has had lumbar MB RFA procedures in the past at the  with short-lived relief.  He has undergone lumbar ESIwith moderate benefit for 3 months.  He currently takes tramadol very sparingly with mild-to-moderate benefit.  He denies any worsening weakness.  No bowel bladder changes.    ROS:  CONSTITUTIONAL: No fevers, chills, night sweats, wt. loss, appetite changes  SKIN: no rashes or itching  ENT: No headaches, head trauma, vision changes, or eye pain  LYMPH NODES: None noticed   CV: No chest pain, palpitations.   RESP: No shortness of breath, dyspnea on exertion, cough, wheezing, or hemoptysis  GI: No nausea, emesis, diarrhea, constipation, melena, hematochezia, pain.    : No dysuria, hematuria, urgency, or frequency   HEME: No easy bruising, bleeding problems  PSYCHIATRIC: No depression,  anxiety, psychosis, hallucinations.  NEURO: No seizures, memory loss, dizziness or difficulty sleeping  MSK:  Positive per HPI    Past Medical History:   Diagnosis Date    Allergy     Arthritis     Depression     Hypertension     Lumbar radiculitis 8/2/2018    LIZET on CPAP      Past Surgical History:   Procedure Laterality Date    BREAST SURGERY      FRACTURE SURGERY      HERNIA REPAIR      SPINE SURGERY      TRANSFORAMINAL EPIDURAL INJECTION OF STEROID Bilateral 3/12/2019    Procedure: Injection,steroid,epidural,transforaminal approach L4-5;  Surgeon: Fidencio Vazquez MD;  Location: [facility] OR;  Service: Pain Management;  Laterality: Bilateral;    TRANSFORAMINAL EPIDURAL INJECTION OF STEROID Bilateral 8/9/2019    Procedure: Injection,steroid,epidural,transforaminal approach;  Surgeon: Fidencio Vazquez MD;  Location: [facility] OR;  Service: Pain Management;  Laterality: Bilateral;  L4-5    TRANSFORAMINAL EPIDURAL INJECTION OF STEROID Bilateral 11/15/2019    Procedure: Injection,steroid,epidural,transforaminal approach;  Surgeon: Fidencio Vazquez MD;  Location: [facility] OR;  Service: Pain Management;  Laterality: Bilateral;  L4-L5    TRANSFORAMINAL EPIDURAL INJECTION OF STEROID Bilateral 2/18/2020    Procedure: Injection,steroid,epidural,transforaminal approach;  Surgeon: Fidencio Vazquez MD;  Location: [facility] OR;  Service: Pain Management;  Laterality: Bilateral;  L4-5    TRANSFORAMINAL EPIDURAL INJECTION OF STEROID Bilateral 7/29/2020    Procedure: Injection,steroid,epidural,transforaminal approach;  Surgeon: Fidencio Vazquez MD;  Location: [facility] OR;  Service: Pain Management;  Laterality: Bilateral;  L4-5     History reviewed. No pertinent family history.  Social History     Socioeconomic History    Marital status:      Spouse name: Not on file    Number of children: Not on file    Years of education: Not on file    Highest education level: Not on file   Occupational History    Not on file   Social Needs    Financial resource  "strain: Not on file    Food insecurity     Worry: Not on file     Inability: Not on file    Transportation needs     Medical: Not on file     Non-medical: Not on file   Tobacco Use    Smoking status: Never Smoker    Smokeless tobacco: Never Used   Substance and Sexual Activity    Alcohol use: Yes     Alcohol/week: 1.0 standard drinks     Types: 1 Cans of beer per week    Drug use: No    Sexual activity: Yes   Lifestyle    Physical activity     Days per week: Not on file     Minutes per session: Not on file    Stress: Not on file   Relationships    Social connections     Talks on phone: Not on file     Gets together: Not on file     Attends [employer] service: Not on file     Active member of club or organization: Not on file     Attends meetings of clubs or organizations: Not on file     Relationship status: Not on file   Other Topics Concern    Not on file   Social History Narrative    Not on file       Medications/Allergies: See med card    Vitals:    10/22/20 0816   BP: 129/84   Pulse: 69   Weight: 81.6 kg (180 lb)   Height: 5' 9" (1.753 m)   PainSc:   7   PainLoc: Neck       Physical exam:    GENERAL: A and O x3, the patient appears well groomed and is in no acute distress.  Skin: No rashes or obvious lesions  HEENT: normocephalic, atraumatic  CARDIOVASCULAR:  RRR  LUNGS: non labored breathing  ABDOMEN: soft, nontender   UPPER EXTREMITIES: Normal alignment, normal range of motion, no atrophy, no skin changes,  hair growth and nail growth normal and equal bilaterally. No swelling, no tenderness.    LOWER EXTREMITIES:  Normal alignment, normal range of motion, no atrophy, no skin changes,  hair growth and nail growth normal and equal bilaterally. No swelling, no tenderness.  CERVICAL SPINE:  Cervical spine: ROM is limited in flexion, extension and lateral rotation with moderate increased pain.  Spurling's maneuver causes no neck pain to either side.  Myofascial exam: No Tenderness to palpation across " cervical paraspinous region bilaterally.    LUMBAR SPINE  Lumbar spine: ROM is full with flexion extension and oblique extension with moderate increased pain.    Darrel's test causes no increased pain on either side.    Supine straight leg raise is negative   Internal and external rotation of the hip causes no increased pain on either side.  Myofascial exam: No tenderness to palpation across lumbar paraspinous muscles.      MENTAL STATUS: normal orientation, speech, language, and fund of knowledge for social situation.  Emotional state appropriate.    CRANIAL NERVES:  II:  PERRL bilaterally,   III,IV,VI: EOMI.    V:  Facial sensation equal bilaterally  VII:  Facial motor function normal.  VIII:  Hearing equal to finger rub bilaterally  IX/X: Gag normal, palate symmetric  XI:  Shoulder shrug equal, head turn equal  XII:  Tongue midline without fasciculations      MOTOR: Tone and bulk: normal bilateral upper and lower Strength: normal   Delt Bi Tri WE WF     R 5 5 5 5 5 5   L 5 5 5 5 5 5     IP ADD ABD Quad TA Gas HAM  R 5 5 5 5 5 5 5  L 5 5 5 5 5 5 5    SENSATION: Light touch and pinprick intact bilaterally  REFLEXES: normal, symmetric, nonbrisk.  Toes down, no clonus. No hoffmans.  GAIT:  Uses cane for assistance     Imaging:  MRI lumbar spine 01/2016 (outside records)  Lumbar spondylosis with small posterior disc protrusion L4-5 and L5-S1 with moderate bilateral neuroforaminal narrowing at L4-5.  Has postoperative changes of a laminotomy at L4-5 noted.    MRI cervical spine 05/2015  Postoperative changes of total disc arthroplasty at C4-5 anterior fusion with interbody spaces at C5-6 and C6-7.    Assessment:  Marshall Barboza is a 50 y.o. male with neck and lower back pain  1. Lumbar radiculitis    2. Postlaminectomy syndrome of lumbar region    3. DDD (degenerative disc disease), lumbar    4. DDD (degenerative disc disease), cervical         Plan:  1. I have stressed the importance of physical activity  and exercise to improve overall health  2. Monitor progress and consider repeat lumbar epidural steroid injection bilaterally at the L4-5 and L5-S1 levels if pain returns to baseline.   3. May consider cervical DEMETRIA if neck pain does not improve in the future  4. He continue tramadol 50 mg q day as needed.  He takes this very sparingly.  5.  Follow-up 3 months or sooner  All medication management was performed by Dr. Fidencio Vazquez

## 2020-12-09 ENCOUNTER — PATIENT MESSAGE (OUTPATIENT)
Dept: PAIN MEDICINE | Facility: CLINIC | Age: 50
End: 2020-12-09

## 2020-12-09 DIAGNOSIS — M54.16 LUMBAR RADICULITIS: Primary | ICD-10-CM

## 2020-12-09 DIAGNOSIS — Z01.818 PRE-OP TESTING: ICD-10-CM

## 2020-12-14 ENCOUNTER — TELEPHONE (OUTPATIENT)
Dept: PAIN MEDICINE | Facility: CLINIC | Age: 50
End: 2020-12-14

## 2020-12-14 ENCOUNTER — LAB VISIT (OUTPATIENT)
Dept: PRIMARY CARE CLINIC | Facility: CLINIC | Age: 50
End: 2020-12-14
Payer: OTHER GOVERNMENT

## 2020-12-14 DIAGNOSIS — Z01.818 PRE-OP TESTING: ICD-10-CM

## 2020-12-14 PROCEDURE — U0003 INFECTIOUS AGENT DETECTION BY NUCLEIC ACID (DNA OR RNA); SEVERE ACUTE RESPIRATORY SYNDROME CORONAVIRUS 2 (SARS-COV-2) (CORONAVIRUS DISEASE [COVID-19]), AMPLIFIED PROBE TECHNIQUE, MAKING USE OF HIGH THROUGHPUT TECHNOLOGIES AS DESCRIBED BY CMS-2020-01-R: HCPCS

## 2020-12-14 NOTE — TELEPHONE ENCOUNTER
Received a message from Marimar Stratton in pre service. Pt needs a new referral from [facility], His procedure scheduled for 12/17 will not be approved until he does. LM for pt to [facility]

## 2020-12-15 ENCOUNTER — TELEPHONE (OUTPATIENT)
Dept: PAIN MEDICINE | Facility: CLINIC | Age: 50
End: 2020-12-15

## 2020-12-15 DIAGNOSIS — U07.1 COVID-19 VIRUS DETECTED: ICD-10-CM

## 2020-12-15 LAB — SARS-COV-2 RNA RESP QL NAA+PROBE: DETECTED

## 2020-12-15 NOTE — TELEPHONE ENCOUNTER
----- Message from Fina Grubbs sent at 12/15/2020 12:57 PM CST -----  Regarding: rtc  Contact: SANJUANA HDEZ [[phone]]  Patient is returning nurse's phone call.  Please call patient back at [phone].

## 2020-12-17 ENCOUNTER — NURSE TRIAGE (OUTPATIENT)
Dept: ADMINISTRATIVE | Facility: CLINIC | Age: 50
End: 2020-12-17

## 2020-12-27 ENCOUNTER — PATIENT MESSAGE (OUTPATIENT)
Dept: PAIN MEDICINE | Facility: CLINIC | Age: 50
End: 2020-12-27

## 2021-01-06 ENCOUNTER — PATIENT MESSAGE (OUTPATIENT)
Dept: PAIN MEDICINE | Facility: CLINIC | Age: 51
End: 2021-01-06

## 2021-01-06 DIAGNOSIS — M54.16 LUMBAR RADICULITIS: Primary | ICD-10-CM

## 2021-01-10 ENCOUNTER — PATIENT MESSAGE (OUTPATIENT)
Dept: PAIN MEDICINE | Facility: CLINIC | Age: 51
End: 2021-01-10

## 2021-01-11 RX ORDER — TRAMADOL HYDROCHLORIDE 50 MG/1
50 TABLET ORAL EVERY 8 HOURS PRN
Qty: 30 TABLET | Refills: 2 | Status: SHIPPED | OUTPATIENT
Start: 2021-01-11 | End: 2021-05-17 | Stop reason: SDUPTHER

## 2021-01-15 ENCOUNTER — HOSPITAL ENCOUNTER (OUTPATIENT)
Facility: AMBULARY SURGERY CENTER | Age: 51
Discharge: HOME | End: 2021-01-15
Attending: ANESTHESIOLOGY | Admitting: ANESTHESIOLOGY
Payer: OTHER GOVERNMENT

## 2021-01-15 DIAGNOSIS — M54.16 LUMBAR RADICULITIS: Primary | ICD-10-CM

## 2021-01-15 PROCEDURE — 64483 NJX AA&/STRD TFRM EPI L/S 1: CPT | Mod: RT | Performed by: ANESTHESIOLOGY

## 2021-01-15 PROCEDURE — 64483 PR EPIDURAL INJ, ANES/STEROID, TRANSFORAMINAL, LUMB/SACR, SNGL LEVL: ICD-10-PCS | Mod: 50,,, | Performed by: ANESTHESIOLOGY

## 2021-01-15 PROCEDURE — 64483 NJX AA&/STRD TFRM EPI L/S 1: CPT | Mod: 50,,, | Performed by: ANESTHESIOLOGY

## 2021-01-15 RX ORDER — DEXAMETHASONE SODIUM PHOSPHATE 10 MG/ML
INJECTION INTRAMUSCULAR; INTRAVENOUS
Status: DISCONTINUED | OUTPATIENT
Start: 2021-01-15 | End: 2021-01-15 | Stop reason: HOSPADM

## 2021-01-15 RX ORDER — SODIUM CHLORIDE, SODIUM LACTATE, POTASSIUM CHLORIDE, CALCIUM CHLORIDE 600; 310; 30; 20 MG/100ML; MG/100ML; MG/100ML; MG/100ML
INJECTION, SOLUTION INTRAVENOUS ONCE AS NEEDED
Status: DISCONTINUED | OUTPATIENT
Start: 2021-01-15 | End: 2021-01-15 | Stop reason: HOSPADM

## 2021-01-15 RX ORDER — BUPIVACAINE HYDROCHLORIDE 2.5 MG/ML
INJECTION, SOLUTION EPIDURAL; INFILTRATION; INTRACAUDAL
Status: DISCONTINUED | OUTPATIENT
Start: 2021-01-15 | End: 2021-01-15 | Stop reason: HOSPADM

## 2021-01-15 RX ORDER — LIDOCAINE HYDROCHLORIDE 10 MG/ML
INJECTION, SOLUTION EPIDURAL; INFILTRATION; INTRACAUDAL; PERINEURAL
Status: DISCONTINUED | OUTPATIENT
Start: 2021-01-15 | End: 2021-01-15 | Stop reason: HOSPADM

## 2021-01-19 VITALS
WEIGHT: 179.88 LBS | SYSTOLIC BLOOD PRESSURE: 140 MMHG | RESPIRATION RATE: 16 BRPM | BODY MASS INDEX: 26.57 KG/M2 | OXYGEN SATURATION: 99 % | DIASTOLIC BLOOD PRESSURE: 75 MMHG | TEMPERATURE: 97 F | HEART RATE: 65 BPM

## 2021-05-17 ENCOUNTER — PATIENT MESSAGE (OUTPATIENT)
Dept: PAIN MEDICINE | Facility: CLINIC | Age: 51
End: 2021-05-17

## 2021-05-17 RX ORDER — TRAMADOL HYDROCHLORIDE 50 MG/1
50 TABLET ORAL EVERY 8 HOURS PRN
Qty: 30 TABLET | Refills: 2 | Status: SHIPPED | OUTPATIENT
Start: 2021-05-17 | End: 2021-08-17

## 2021-08-17 RX ORDER — TRAMADOL HYDROCHLORIDE 50 MG/1
TABLET ORAL
Qty: 30 TABLET | OUTPATIENT
Start: 2021-08-17

## 2021-10-25 ENCOUNTER — OFFICE VISIT (OUTPATIENT)
Dept: PAIN MEDICINE | Facility: CLINIC | Age: 51
End: 2021-10-25
Payer: OTHER GOVERNMENT

## 2021-10-25 VITALS
HEIGHT: 69 IN | SYSTOLIC BLOOD PRESSURE: 142 MMHG | WEIGHT: 179 LBS | DIASTOLIC BLOOD PRESSURE: 94 MMHG | HEART RATE: 85 BPM | BODY MASS INDEX: 26.51 KG/M2

## 2021-10-25 DIAGNOSIS — M51.36 DDD (DEGENERATIVE DISC DISEASE), LUMBAR: ICD-10-CM

## 2021-10-25 DIAGNOSIS — M54.16 LUMBAR RADICULITIS: Primary | ICD-10-CM

## 2021-10-25 DIAGNOSIS — M50.30 DDD (DEGENERATIVE DISC DISEASE), CERVICAL: ICD-10-CM

## 2021-10-25 DIAGNOSIS — M96.1 POSTLAMINECTOMY SYNDROME OF LUMBAR REGION: ICD-10-CM

## 2021-10-25 PROCEDURE — 99999 PR PBB SHADOW E&M-EST. PATIENT-LVL III: CPT | Mod: PBBFAC,,, | Performed by: PHYSICIAN ASSISTANT

## 2021-10-25 PROCEDURE — 99214 OFFICE O/P EST MOD 30 MIN: CPT | Mod: S$PBB,,, | Performed by: PHYSICIAN ASSISTANT

## 2021-10-25 PROCEDURE — 99214 PR OFFICE/OUTPT VISIT, EST, LEVL IV, 30-39 MIN: ICD-10-PCS | Mod: S$PBB,,, | Performed by: PHYSICIAN ASSISTANT

## 2021-10-25 PROCEDURE — 99999 PR PBB SHADOW E&M-EST. PATIENT-LVL III: ICD-10-PCS | Mod: PBBFAC,,, | Performed by: PHYSICIAN ASSISTANT

## 2021-10-25 PROCEDURE — 99213 OFFICE O/P EST LOW 20 MIN: CPT | Mod: PBBFAC,PN | Performed by: PHYSICIAN ASSISTANT

## 2021-10-25 RX ORDER — TRAMADOL HYDROCHLORIDE 50 MG/1
50 TABLET ORAL NIGHTLY
Qty: 30 TABLET | Refills: 0 | Status: SHIPPED | OUTPATIENT
Start: 2021-10-25 | End: 2021-11-15

## 2021-11-09 ENCOUNTER — HOSPITAL ENCOUNTER (OUTPATIENT)
Facility: AMBULARY SURGERY CENTER | Age: 51
Discharge: HOME | End: 2021-11-09
Attending: ANESTHESIOLOGY | Admitting: ANESTHESIOLOGY
Payer: OTHER GOVERNMENT

## 2021-11-09 VITALS
WEIGHT: 179 LBS | RESPIRATION RATE: 16 BRPM | HEIGHT: 69 IN | OXYGEN SATURATION: 98 % | SYSTOLIC BLOOD PRESSURE: 157 MMHG | DIASTOLIC BLOOD PRESSURE: 106 MMHG | BODY MASS INDEX: 26.51 KG/M2 | TEMPERATURE: 99 F | HEART RATE: 68 BPM

## 2021-11-09 DIAGNOSIS — M54.16 LUMBAR RADICULITIS: Primary | ICD-10-CM

## 2021-11-09 PROCEDURE — 64483 NJX AA&/STRD TFRM EPI L/S 1: CPT | Mod: 50,,, | Performed by: ANESTHESIOLOGY

## 2021-11-09 PROCEDURE — 64483 NJX AA&/STRD TFRM EPI L/S 1: CPT | Mod: RT | Performed by: ANESTHESIOLOGY

## 2021-11-09 PROCEDURE — 64483 PR EPIDURAL INJ, ANES/STEROID, TRANSFORAMINAL, LUMB/SACR, SNGL LEVL: ICD-10-PCS | Mod: 50,,, | Performed by: ANESTHESIOLOGY

## 2021-11-09 RX ORDER — BUPIVACAINE HYDROCHLORIDE 2.5 MG/ML
INJECTION, SOLUTION EPIDURAL; INFILTRATION; INTRACAUDAL
Status: DISCONTINUED | OUTPATIENT
Start: 2021-11-09 | End: 2021-11-09 | Stop reason: HOSPADM

## 2021-11-09 RX ORDER — SODIUM CHLORIDE, SODIUM LACTATE, POTASSIUM CHLORIDE, CALCIUM CHLORIDE 600; 310; 30; 20 MG/100ML; MG/100ML; MG/100ML; MG/100ML
INJECTION, SOLUTION INTRAVENOUS ONCE AS NEEDED
Status: DISCONTINUED | OUTPATIENT
Start: 2021-11-09 | End: 2021-11-09 | Stop reason: HOSPADM

## 2021-11-09 RX ORDER — DEXAMETHASONE SODIUM PHOSPHATE 10 MG/ML
INJECTION INTRAMUSCULAR; INTRAVENOUS
Status: DISCONTINUED | OUTPATIENT
Start: 2021-11-09 | End: 2021-11-09 | Stop reason: HOSPADM

## 2021-11-09 RX ORDER — LIDOCAINE HYDROCHLORIDE 10 MG/ML
INJECTION, SOLUTION EPIDURAL; INFILTRATION; INTRACAUDAL; PERINEURAL
Status: DISCONTINUED | OUTPATIENT
Start: 2021-11-09 | End: 2021-11-09 | Stop reason: HOSPADM

## 2021-12-07 ENCOUNTER — OFFICE VISIT (OUTPATIENT)
Dept: PAIN MEDICINE | Facility: CLINIC | Age: 51
End: 2021-12-07
Payer: OTHER GOVERNMENT

## 2021-12-07 ENCOUNTER — HOSPITAL ENCOUNTER (OUTPATIENT)
Dept: RADIOLOGY | Facility: HOSPITAL | Age: 51
Discharge: HOME | End: 2021-12-07
Attending: PHYSICIAN ASSISTANT
Payer: OTHER GOVERNMENT

## 2021-12-07 VITALS
HEART RATE: 74 BPM | HEIGHT: 69 IN | DIASTOLIC BLOOD PRESSURE: 100 MMHG | WEIGHT: 179 LBS | BODY MASS INDEX: 26.51 KG/M2 | SYSTOLIC BLOOD PRESSURE: 142 MMHG

## 2021-12-07 DIAGNOSIS — M50.30 DDD (DEGENERATIVE DISC DISEASE), CERVICAL: Primary | ICD-10-CM

## 2021-12-07 DIAGNOSIS — M50.30 DDD (DEGENERATIVE DISC DISEASE), CERVICAL: ICD-10-CM

## 2021-12-07 DIAGNOSIS — M54.16 LUMBAR RADICULITIS: ICD-10-CM

## 2021-12-07 DIAGNOSIS — M96.1 POSTLAMINECTOMY SYNDROME OF LUMBAR REGION: ICD-10-CM

## 2021-12-07 DIAGNOSIS — M51.36 DDD (DEGENERATIVE DISC DISEASE), LUMBAR: ICD-10-CM

## 2021-12-07 PROCEDURE — 99213 OFFICE O/P EST LOW 20 MIN: CPT | Mod: PBBFAC,PN | Performed by: PHYSICIAN ASSISTANT

## 2021-12-07 PROCEDURE — 72050 X-RAY EXAM NECK SPINE 4/5VWS: CPT | Mod: 26,,, | Performed by: RADIOLOGY

## 2021-12-07 PROCEDURE — 72050 XR CERVICAL SPINE COMPLETE 5 VIEW: ICD-10-PCS | Mod: 26,,, | Performed by: RADIOLOGY

## 2021-12-07 PROCEDURE — 99213 PR OFFICE/OUTPT VISIT, EST, LEVL III, 20-29 MIN: ICD-10-PCS | Mod: S$PBB,,, | Performed by: PHYSICIAN ASSISTANT

## 2021-12-07 PROCEDURE — 99999 PR PBB SHADOW E&M-EST. PATIENT-LVL III: ICD-10-PCS | Mod: PBBFAC,,, | Performed by: PHYSICIAN ASSISTANT

## 2021-12-07 PROCEDURE — 99213 OFFICE O/P EST LOW 20 MIN: CPT | Mod: S$PBB,,, | Performed by: PHYSICIAN ASSISTANT

## 2021-12-07 PROCEDURE — 72050 X-RAY EXAM NECK SPINE 4/5VWS: CPT | Mod: TC,FY

## 2021-12-07 PROCEDURE — 99999 PR PBB SHADOW E&M-EST. PATIENT-LVL III: CPT | Mod: PBBFAC,,, | Performed by: PHYSICIAN ASSISTANT

## 2021-12-07 RX ORDER — ENALAPRIL MALEATE 10 MG/1
10 TABLET ORAL DAILY
COMMUNITY
Start: 2021-11-19

## 2021-12-08 ENCOUNTER — TELEPHONE (OUTPATIENT)
Dept: PAIN MEDICINE | Facility: CLINIC | Age: 51
End: 2021-12-08
Payer: OTHER GOVERNMENT

## 2021-12-08 DIAGNOSIS — M50.30 DDD (DEGENERATIVE DISC DISEASE), CERVICAL: Primary | ICD-10-CM

## 2021-12-15 ENCOUNTER — TELEPHONE (OUTPATIENT)
Dept: PAIN MEDICINE | Facility: CLINIC | Age: 51
End: 2021-12-15
Payer: OTHER GOVERNMENT

## 2021-12-21 ENCOUNTER — TELEPHONE (OUTPATIENT)
Dept: PAIN MEDICINE | Facility: CLINIC | Age: 51
End: 2021-12-21
Payer: OTHER GOVERNMENT

## 2021-12-22 ENCOUNTER — TELEPHONE (OUTPATIENT)
Dept: PAIN MEDICINE | Facility: CLINIC | Age: 51
End: 2021-12-22
Payer: OTHER GOVERNMENT

## 2021-12-22 DIAGNOSIS — M50.30 DDD (DEGENERATIVE DISC DISEASE), CERVICAL: Primary | ICD-10-CM

## 2021-12-23 ENCOUNTER — TELEPHONE (OUTPATIENT)
Dept: PAIN MEDICINE | Facility: CLINIC | Age: 51
End: 2021-12-23
Payer: OTHER GOVERNMENT

## 2021-12-28 ENCOUNTER — HOSPITAL ENCOUNTER (OUTPATIENT)
Facility: AMBULARY SURGERY CENTER | Age: 51
Discharge: HOME | End: 2021-12-28
Attending: ANESTHESIOLOGY | Admitting: ANESTHESIOLOGY
Payer: OTHER GOVERNMENT

## 2021-12-28 DIAGNOSIS — M54.12 CERVICAL RADICULITIS: ICD-10-CM

## 2021-12-28 PROCEDURE — 62321 PR INJ CERV/THORAC, W/GUIDANCE: ICD-10-PCS | Mod: ,,, | Performed by: ANESTHESIOLOGY

## 2021-12-28 PROCEDURE — 62321 NJX INTERLAMINAR CRV/THRC: CPT | Performed by: ANESTHESIOLOGY

## 2021-12-28 PROCEDURE — 62321 NJX INTERLAMINAR CRV/THRC: CPT | Mod: ,,, | Performed by: ANESTHESIOLOGY

## 2021-12-28 RX ORDER — SODIUM CHLORIDE, SODIUM LACTATE, POTASSIUM CHLORIDE, CALCIUM CHLORIDE 600; 310; 30; 20 MG/100ML; MG/100ML; MG/100ML; MG/100ML
INJECTION, SOLUTION INTRAVENOUS CONTINUOUS
Status: DISCONTINUED | OUTPATIENT
Start: 2021-12-28 | End: 2021-12-28 | Stop reason: HOSPADM

## 2021-12-28 RX ORDER — DEXAMETHASONE SODIUM PHOSPHATE 10 MG/ML
INJECTION INTRAMUSCULAR; INTRAVENOUS
Status: DISCONTINUED | OUTPATIENT
Start: 2021-12-28 | End: 2021-12-28 | Stop reason: HOSPADM

## 2021-12-28 RX ORDER — SODIUM CHLORIDE 9 MG/ML
INJECTION, SOLUTION INTRAMUSCULAR; INTRAVENOUS; SUBCUTANEOUS
Status: DISCONTINUED | OUTPATIENT
Start: 2021-12-28 | End: 2021-12-28 | Stop reason: HOSPADM

## 2021-12-28 RX ORDER — LIDOCAINE HYDROCHLORIDE 10 MG/ML
INJECTION, SOLUTION EPIDURAL; INFILTRATION; INTRACAUDAL; PERINEURAL
Status: DISCONTINUED | OUTPATIENT
Start: 2021-12-28 | End: 2021-12-28 | Stop reason: HOSPADM

## 2021-12-30 VITALS
WEIGHT: 179 LBS | RESPIRATION RATE: 16 BRPM | BODY MASS INDEX: 26.44 KG/M2 | SYSTOLIC BLOOD PRESSURE: 134 MMHG | DIASTOLIC BLOOD PRESSURE: 84 MMHG | OXYGEN SATURATION: 97 % | TEMPERATURE: 97 F | HEART RATE: 72 BPM

## 2022-02-28 ENCOUNTER — PATIENT MESSAGE (OUTPATIENT)
Dept: PAIN MEDICINE | Facility: CLINIC | Age: 52
End: 2022-02-28
Payer: OTHER GOVERNMENT

## 2022-02-28 RX ORDER — TRAMADOL HYDROCHLORIDE 50 MG/1
50 TABLET ORAL NIGHTLY
Qty: 30 TABLET | Refills: 0 | Status: SHIPPED | OUTPATIENT
Start: 2022-02-28 | End: 2022-06-28 | Stop reason: SDUPTHER

## 2022-06-27 ENCOUNTER — TELEPHONE (OUTPATIENT)
Dept: PAIN MEDICINE | Facility: CLINIC | Age: 52
End: 2022-06-27
Payer: OTHER GOVERNMENT

## 2022-06-27 DIAGNOSIS — M50.30 DDD (DEGENERATIVE DISC DISEASE), CERVICAL: Primary | ICD-10-CM

## 2022-06-27 NOTE — TELEPHONE ENCOUNTER
This patient is requesting repeat injection for lower back. Pain is the same and in same location. Ok to repeat? He also is requesting a refill on tramadol. Please advise.

## 2022-06-27 NOTE — TELEPHONE ENCOUNTER
Pt is requesting repeat lower back injection. States he is having the same pain in the same location. Ok to repeat? He is also requesting a refill on Tramadol. Please advise.

## 2022-06-27 NOTE — TELEPHONE ENCOUNTER
Scheduled for 07/12 for injections repeat injection pt did get 60% or better relief from last injection. Scheduled office visit as well

## 2022-06-28 ENCOUNTER — OFFICE VISIT (OUTPATIENT)
Dept: PAIN MEDICINE | Facility: CLINIC | Age: 52
End: 2022-06-28
Payer: OTHER GOVERNMENT

## 2022-06-28 VITALS
SYSTOLIC BLOOD PRESSURE: 152 MMHG | DIASTOLIC BLOOD PRESSURE: 91 MMHG | HEIGHT: 69 IN | BODY MASS INDEX: 26.51 KG/M2 | WEIGHT: 179 LBS | HEART RATE: 62 BPM

## 2022-06-28 DIAGNOSIS — M96.1 POSTLAMINECTOMY SYNDROME OF LUMBAR REGION: ICD-10-CM

## 2022-06-28 DIAGNOSIS — M51.36 DDD (DEGENERATIVE DISC DISEASE), LUMBAR: ICD-10-CM

## 2022-06-28 DIAGNOSIS — M54.16 LUMBAR RADICULITIS: ICD-10-CM

## 2022-06-28 DIAGNOSIS — M50.30 DDD (DEGENERATIVE DISC DISEASE), CERVICAL: ICD-10-CM

## 2022-06-28 DIAGNOSIS — F11.90 CHRONIC, CONTINUOUS USE OF OPIOIDS: Primary | ICD-10-CM

## 2022-06-28 PROCEDURE — 99999 PR PBB SHADOW E&M-EST. PATIENT-LVL III: ICD-10-PCS | Mod: PBBFAC,,, | Performed by: PHYSICIAN ASSISTANT

## 2022-06-28 PROCEDURE — 99213 OFFICE O/P EST LOW 20 MIN: CPT | Mod: PBBFAC,PN | Performed by: PHYSICIAN ASSISTANT

## 2022-06-28 PROCEDURE — 99214 PR OFFICE/OUTPT VISIT, EST, LEVL IV, 30-39 MIN: ICD-10-PCS | Mod: S$PBB,,, | Performed by: PHYSICIAN ASSISTANT

## 2022-06-28 PROCEDURE — 99999 PR PBB SHADOW E&M-EST. PATIENT-LVL III: CPT | Mod: PBBFAC,,, | Performed by: PHYSICIAN ASSISTANT

## 2022-06-28 PROCEDURE — 80326 AMPHETAMINES 5 OR MORE: CPT | Performed by: PHYSICIAN ASSISTANT

## 2022-06-28 PROCEDURE — 99214 OFFICE O/P EST MOD 30 MIN: CPT | Mod: S$PBB,,, | Performed by: PHYSICIAN ASSISTANT

## 2022-06-28 RX ORDER — TRAMADOL HYDROCHLORIDE 50 MG/1
50 TABLET ORAL NIGHTLY
Qty: 30 TABLET | Refills: 0 | Status: SHIPPED | OUTPATIENT
Start: 2022-06-28 | End: 2022-09-30 | Stop reason: SDUPTHER

## 2022-06-28 NOTE — H&P (VIEW-ONLY)
Referring Physician: No ref. provider found    PCP: Sara Quan NP      CC:  Neck and lower back pain    Interval History:  Marshall Barboza is a 50 y.o. male with chronic neck and low back pain who presents today for f/u s/p repeat  L4-5 TF DEMETRIA that provided 40% relief. Previous provided 90% relief. Reports low back pain that radiates down bilateral lateral thighs.  Terminates at knee. This has returned to prior intensity and frequency. Denies any weakness, b/b changes, or foot drop. Does not have any symptoms below the knee. He take Tramadol sparingly. Neck pain has improved s/p cervical DEMETRIA.  He has a history of C4-C7 ACDF.  Denies dropping objects or hand writing changes. Pain today is rated 5/10.     Prior HPI:   Marshall Barboza is a 52 y.o. male referred to us for neck and lower back pain.  Patient is a former active  personnel.  He has significant history of cervical and lumbar spine surgery.  He underwent L4-5 laminotomy and diskectomy in February 2015.  Radicular pain improved but he continues to have lower back pain.  He also was found to have cervical DDD.  He underwent a C4-C7 ACDF.  Lower back and right leg pain is more bothersome currently.  He has constant aching, throbbing pain in his lower back.  Pain radiates to down his right buttock into his right calf.  Pain occasionally travels down the left calf.  He has had lumbar MB RFA procedures in the past at the  with short-lived relief.  He has undergone lumbar ESIwith moderate benefit for 3 months.  He currently takes tramadol very sparingly with mild-to-moderate benefit.  He denies any worsening weakness.  No bowel bladder changes.    Interventional History:    12/28/21 Cervical DEMETRIA >40% relief  11/9/21 b/l TF DEMETRIA at L4-5 >40% relief   B/L TF DEMETRIA L4-5 on 1/15/21 >90% relief x 6 months  B/L TF DEMETRIA L4-5 on 11/9/21 >40% relief   ROS:  CONSTITUTIONAL: No fevers, chills, night sweats, wt. loss, appetite changes  SKIN: no  rashes or itching  ENT: No headaches, head trauma, vision changes, or eye pain  LYMPH NODES: None noticed   CV: No chest pain, palpitations.   RESP: No shortness of breath, dyspnea on exertion, cough, wheezing, or hemoptysis  GI: No nausea, emesis, diarrhea, constipation, melena, hematochezia, pain.    : No dysuria, hematuria, urgency, or frequency   HEME: No easy bruising, bleeding problems  PSYCHIATRIC: No depression, anxiety, psychosis, hallucinations.  NEURO: No seizures, memory loss, dizziness or difficulty sleeping  MSK:  Positive per HPI    Past Medical History:   Diagnosis Date    Allergy     Arthritis     Depression     Hypertension     Lumbar radiculitis 8/2/2018    LIZET on CPAP      Past Surgical History:   Procedure Laterality Date    BREAST SURGERY      EPIDURAL STEROID INJECTION INTO CERVICAL SPINE N/A 12/28/2021    Procedure: Injection-steroid-epidural-cervical;  Surgeon: Fidencio Vazquez MD;  Location: [facility];  Service: Pain Management;  Laterality: N/A;  C7-T1     FRACTURE SURGERY      HERNIA REPAIR      SPINE SURGERY      TRANSFORAMINAL EPIDURAL INJECTION OF STEROID Bilateral 3/12/2019    Procedure: Injection,steroid,epidural,transforaminal approach L4-5;  Surgeon: Fidencio Vazquez MD;  Location: [facility] OR;  Service: Pain Management;  Laterality: Bilateral;    TRANSFORAMINAL EPIDURAL INJECTION OF STEROID Bilateral 8/9/2019    Procedure: Injection,steroid,epidural,transforaminal approach;  Surgeon: Fidencio Vazquez MD;  Location: [facility] OR;  Service: Pain Management;  Laterality: Bilateral;  L4-5    TRANSFORAMINAL EPIDURAL INJECTION OF STEROID Bilateral 11/15/2019    Procedure: Injection,steroid,epidural,transforaminal approach;  Surgeon: Fidencio Vazquez MD;  Location: [facility] OR;  Service: Pain Management;  Laterality: Bilateral;  L4-L5    TRANSFORAMINAL EPIDURAL INJECTION OF STEROID Bilateral 2/18/2020    Procedure: Injection,steroid,epidural,transforaminal approach;  Surgeon: Fidencio Vazquez MD;  Location:  "[facility] OR;  Service: Pain Management;  Laterality: Bilateral;  L4-5    TRANSFORAMINAL EPIDURAL INJECTION OF STEROID Bilateral 7/29/2020    Procedure: Injection,steroid,epidural,transforaminal approach;  Surgeon: Fidencio Vazquez MD;  Location: [facility] OR;  Service: Pain Management;  Laterality: Bilateral;  L4-5    TRANSFORAMINAL EPIDURAL INJECTION OF STEROID Bilateral 1/15/2021    Procedure: Injection,steroid,epidural,transforaminal approach BILATERAL L4-5;  Surgeon: Fidencio Vazquez MD;  Location: [facility] OR;  Service: Pain Management;  Laterality: Bilateral;    TRANSFORAMINAL EPIDURAL INJECTION OF STEROID Bilateral 11/9/2021    Procedure: Injection,steroid,epidural,transforaminal approach;  Surgeon: Fidencio Vazquez MD;  Location: [facility] OR;  Service: Pain Management;  Laterality: Bilateral;  L4-5     No family history on file.  Social History     Socioeconomic History    Marital status:    Tobacco Use    Smoking status: Never Smoker    Smokeless tobacco: Never Used   Substance and Sexual Activity    Alcohol use: Yes     Alcohol/week: 1.0 standard drink     Types: 1 Cans of beer per week    Drug use: No    Sexual activity: Yes       Medications/Allergies: See med card    Vitals:    06/28/22 0851 06/28/22 0853   BP: (!) 153/104 (!) 152/91   Pulse: 72 62   Weight: 81.2 kg (179 lb)    Height: 5' 9" (1.753 m)    PainSc:   8        Physical exam:    GENERAL: A and O x3, the patient appears well groomed and is in no acute distress.  Skin: No rashes or obvious lesions  HEENT: normocephalic, atraumatic  CARDIOVASCULAR:  RRR  LUNGS: non labored breathing  ABDOMEN: soft, nontender   UPPER EXTREMITIES: Normal alignment, normal range of motion, no atrophy, no skin changes,  hair growth and nail growth normal and equal bilaterally. No swelling, no tenderness.    LOWER EXTREMITIES:  Normal alignment, normal range of motion, no atrophy, no skin changes,  hair growth and nail growth normal and equal bilaterally. No swelling, no " tenderness.  CERVICAL SPINE:  Cervical spine: ROM is limited in flexion, extension and lateral rotation with moderate increased pain.  Spurling's maneuver causes no neck pain to either side.  Myofascial exam: No Tenderness to palpation across cervical paraspinous region bilaterally.    LUMBAR SPINE  Lumbar spine: ROM is full with flexion extension and oblique extension with moderate increased pain.    Darrel's test causes no increased pain on either side.    Supine straight leg raise is negative   Internal and external rotation of the hip causes no increased pain on either side.  Myofascial exam: No tenderness to palpation across lumbar paraspinous muscles.      MENTAL STATUS: normal orientation, speech, language, and fund of knowledge for social situation.  Emotional state appropriate.    CRANIAL NERVES:  II:  PERRL bilaterally,   III,IV,VI: EOMI.    V:  Facial sensation equal bilaterally  VII:  Facial motor function normal.  VIII:  Hearing equal to finger rub bilaterally  IX/X: Gag normal, palate symmetric  XI:  Shoulder shrug equal, head turn equal  XII:  Tongue midline without fasciculations      MOTOR: Tone and bulk: normal bilateral upper and lower Strength: normal   Delt Bi Tri WE WF     R 5 5 5 5 5 5   L 5 5 5 5 5 5     IP ADD ABD Quad TA Gas HAM  R 5 5 5 5 5 5 5  L 5 5 5 5 5 5 5    SENSATION: Light touch and pinprick intact bilaterally  REFLEXES: normal, symmetric, nonbrisk.  Toes down, no clonus. No hoffmans.  GAIT:  Uses cane for assistance     Imaging:  MRI lumbar spine 01/2016 (outside records)  Lumbar spondylosis with small posterior disc protrusion L4-5 and L5-S1 with moderate bilateral neuroforaminal narrowing at L4-5.  Has postoperative changes of a laminotomy at L4-5 noted.    MRI cervical spine 05/2015  Postoperative changes of total disc arthroplasty at C4-5 anterior fusion with interbody spaces at C5-6 and C6-7.    Assessment:  Marshall Barboza is a 52 y.o. male with neck and lower  back pain  1. Chronic, continuous use of opioids    2. Postlaminectomy syndrome of lumbar region    3. DDD (degenerative disc disease), lumbar    4. Lumbar radiculitis    5. DDD (degenerative disc disease), cervical         Plan:  1. I have stressed the importance of physical activity and exercise to improve overall health  2.Schedule repeat lumbar epidural steroid injection bilaterally at the L4-5  I have explained the risks, benefits, and alternatives of the procedure in detail. The patient voices understanding and all questions have been answered. The patient agrees to proceed as planned. Written Consent obtained.   3.  consider repeat cervical DEMETRIA if neck pain worsens  4. He continue tramadol 50 mg q day as needed.  He takes this very sparingly. Prescription sent to pharmacy.  UDS today  5.  Follow up with PCP regarding elevated BP  6.  Follow-up s/p procedure.   All medication management was performed by Dr. Fidencio Vazquez

## 2022-06-28 NOTE — PROGRESS NOTES
Referring Physician: No ref. provider found    PCP: Sara Quan NP      CC:  Neck and lower back pain    Interval History:  Marshall Barboza is a 50 y.o. male with chronic neck and low back pain who presents today for f/u s/p repeat  L4-5 TF DEMETRIA that provided 40% relief. Previous provided 90% relief. Reports low back pain that radiates down bilateral lateral thighs.  Terminates at knee. This has returned to prior intensity and frequency. Denies any weakness, b/b changes, or foot drop. Does not have any symptoms below the knee. He take Tramadol sparingly. Neck pain has improved s/p cervical DEMETRIA.  He has a history of C4-C7 ACDF.  Denies dropping objects or hand writing changes. Pain today is rated 5/10.     Prior HPI:   Marshall Barboza is a 52 y.o. male referred to us for neck and lower back pain.  Patient is a former active  personnel.  He has significant history of cervical and lumbar spine surgery.  He underwent L4-5 laminotomy and diskectomy in February 2015.  Radicular pain improved but he continues to have lower back pain.  He also was found to have cervical DDD.  He underwent a C4-C7 ACDF.  Lower back and right leg pain is more bothersome currently.  He has constant aching, throbbing pain in his lower back.  Pain radiates to down his right buttock into his right calf.  Pain occasionally travels down the left calf.  He has had lumbar MB RFA procedures in the past at the  with short-lived relief.  He has undergone lumbar ESIwith moderate benefit for 3 months.  He currently takes tramadol very sparingly with mild-to-moderate benefit.  He denies any worsening weakness.  No bowel bladder changes.    Interventional History:    12/28/21 Cervical DEMETRIA >40% relief  11/9/21 b/l TF DEMETRIA at L4-5 >40% relief   B/L TF DEMETRIA L4-5 on 1/15/21 >90% relief x 6 months  B/L TF DEMETRIA L4-5 on 11/9/21 >40% relief   ROS:  CONSTITUTIONAL: No fevers, chills, night sweats, wt. loss, appetite changes  SKIN: no  rashes or itching  ENT: No headaches, head trauma, vision changes, or eye pain  LYMPH NODES: None noticed   CV: No chest pain, palpitations.   RESP: No shortness of breath, dyspnea on exertion, cough, wheezing, or hemoptysis  GI: No nausea, emesis, diarrhea, constipation, melena, hematochezia, pain.    : No dysuria, hematuria, urgency, or frequency   HEME: No easy bruising, bleeding problems  PSYCHIATRIC: No depression, anxiety, psychosis, hallucinations.  NEURO: No seizures, memory loss, dizziness or difficulty sleeping  MSK:  Positive per HPI    Past Medical History:   Diagnosis Date    Allergy     Arthritis     Depression     Hypertension     Lumbar radiculitis 8/2/2018    LIZET on CPAP      Past Surgical History:   Procedure Laterality Date    BREAST SURGERY      EPIDURAL STEROID INJECTION INTO CERVICAL SPINE N/A 12/28/2021    Procedure: Injection-steroid-epidural-cervical;  Surgeon: Fidencio Vazquez MD;  Location: [facility];  Service: Pain Management;  Laterality: N/A;  C7-T1     FRACTURE SURGERY      HERNIA REPAIR      SPINE SURGERY      TRANSFORAMINAL EPIDURAL INJECTION OF STEROID Bilateral 3/12/2019    Procedure: Injection,steroid,epidural,transforaminal approach L4-5;  Surgeon: Fidencio Vazquez MD;  Location: [facility] OR;  Service: Pain Management;  Laterality: Bilateral;    TRANSFORAMINAL EPIDURAL INJECTION OF STEROID Bilateral 8/9/2019    Procedure: Injection,steroid,epidural,transforaminal approach;  Surgeon: Fidnecio Vazquez MD;  Location: [facility] OR;  Service: Pain Management;  Laterality: Bilateral;  L4-5    TRANSFORAMINAL EPIDURAL INJECTION OF STEROID Bilateral 11/15/2019    Procedure: Injection,steroid,epidural,transforaminal approach;  Surgeon: Fidencio Vazquez MD;  Location: [facility] OR;  Service: Pain Management;  Laterality: Bilateral;  L4-L5    TRANSFORAMINAL EPIDURAL INJECTION OF STEROID Bilateral 2/18/2020    Procedure: Injection,steroid,epidural,transforaminal approach;  Surgeon: Fidencio Vazquez MD;  Location:  "[facility] OR;  Service: Pain Management;  Laterality: Bilateral;  L4-5    TRANSFORAMINAL EPIDURAL INJECTION OF STEROID Bilateral 7/29/2020    Procedure: Injection,steroid,epidural,transforaminal approach;  Surgeon: Fidencio Vazquez MD;  Location: [facility] OR;  Service: Pain Management;  Laterality: Bilateral;  L4-5    TRANSFORAMINAL EPIDURAL INJECTION OF STEROID Bilateral 1/15/2021    Procedure: Injection,steroid,epidural,transforaminal approach BILATERAL L4-5;  Surgeon: Fidencio Vazquez MD;  Location: [facility] OR;  Service: Pain Management;  Laterality: Bilateral;    TRANSFORAMINAL EPIDURAL INJECTION OF STEROID Bilateral 11/9/2021    Procedure: Injection,steroid,epidural,transforaminal approach;  Surgeon: Fidencio Vazquez MD;  Location: [facility] OR;  Service: Pain Management;  Laterality: Bilateral;  L4-5     No family history on file.  Social History     Socioeconomic History    Marital status:    Tobacco Use    Smoking status: Never Smoker    Smokeless tobacco: Never Used   Substance and Sexual Activity    Alcohol use: Yes     Alcohol/week: 1.0 standard drink     Types: 1 Cans of beer per week    Drug use: No    Sexual activity: Yes       Medications/Allergies: See med card    Vitals:    06/28/22 0851 06/28/22 0853   BP: (!) 153/104 (!) 152/91   Pulse: 72 62   Weight: 81.2 kg (179 lb)    Height: 5' 9" (1.753 m)    PainSc:   8        Physical exam:    GENERAL: A and O x3, the patient appears well groomed and is in no acute distress.  Skin: No rashes or obvious lesions  HEENT: normocephalic, atraumatic  CARDIOVASCULAR:  RRR  LUNGS: non labored breathing  ABDOMEN: soft, nontender   UPPER EXTREMITIES: Normal alignment, normal range of motion, no atrophy, no skin changes,  hair growth and nail growth normal and equal bilaterally. No swelling, no tenderness.    LOWER EXTREMITIES:  Normal alignment, normal range of motion, no atrophy, no skin changes,  hair growth and nail growth normal and equal bilaterally. No swelling, no " tenderness.  CERVICAL SPINE:  Cervical spine: ROM is limited in flexion, extension and lateral rotation with moderate increased pain.  Spurling's maneuver causes no neck pain to either side.  Myofascial exam: No Tenderness to palpation across cervical paraspinous region bilaterally.    LUMBAR SPINE  Lumbar spine: ROM is full with flexion extension and oblique extension with moderate increased pain.    Darrel's test causes no increased pain on either side.    Supine straight leg raise is negative   Internal and external rotation of the hip causes no increased pain on either side.  Myofascial exam: No tenderness to palpation across lumbar paraspinous muscles.      MENTAL STATUS: normal orientation, speech, language, and fund of knowledge for social situation.  Emotional state appropriate.    CRANIAL NERVES:  II:  PERRL bilaterally,   III,IV,VI: EOMI.    V:  Facial sensation equal bilaterally  VII:  Facial motor function normal.  VIII:  Hearing equal to finger rub bilaterally  IX/X: Gag normal, palate symmetric  XI:  Shoulder shrug equal, head turn equal  XII:  Tongue midline without fasciculations      MOTOR: Tone and bulk: normal bilateral upper and lower Strength: normal   Delt Bi Tri WE WF     R 5 5 5 5 5 5   L 5 5 5 5 5 5     IP ADD ABD Quad TA Gas HAM  R 5 5 5 5 5 5 5  L 5 5 5 5 5 5 5    SENSATION: Light touch and pinprick intact bilaterally  REFLEXES: normal, symmetric, nonbrisk.  Toes down, no clonus. No hoffmans.  GAIT:  Uses cane for assistance     Imaging:  MRI lumbar spine 01/2016 (outside records)  Lumbar spondylosis with small posterior disc protrusion L4-5 and L5-S1 with moderate bilateral neuroforaminal narrowing at L4-5.  Has postoperative changes of a laminotomy at L4-5 noted.    MRI cervical spine 05/2015  Postoperative changes of total disc arthroplasty at C4-5 anterior fusion with interbody spaces at C5-6 and C6-7.    Assessment:  Marshall Barboza is a 52 y.o. male with neck and lower  back pain  1. Chronic, continuous use of opioids    2. Postlaminectomy syndrome of lumbar region    3. DDD (degenerative disc disease), lumbar    4. Lumbar radiculitis    5. DDD (degenerative disc disease), cervical         Plan:  1. I have stressed the importance of physical activity and exercise to improve overall health  2.Schedule repeat lumbar epidural steroid injection bilaterally at the L4-5  I have explained the risks, benefits, and alternatives of the procedure in detail. The patient voices understanding and all questions have been answered. The patient agrees to proceed as planned. Written Consent obtained.   3.  consider repeat cervical DEMETRIA if neck pain worsens  4. He continue tramadol 50 mg q day as needed.  He takes this very sparingly. Prescription sent to pharmacy.  UDS today  5.  Follow up with PCP regarding elevated BP  6.  Follow-up s/p procedure.   All medication management was performed by Dr. Fidencio Vazquez

## 2022-07-03 LAB
6MAM UR QL: NOT DETECTED
7AMINOCLONAZEPAM UR QL: NOT DETECTED
A-OH ALPRAZ UR QL: NOT DETECTED
ALPHA-OH-MIDAZOLAM: NOT DETECTED
ALPRAZ UR QL: NOT DETECTED
AMPHET UR QL SCN: NOT DETECTED
ANNOTATION COMMENT IMP: NORMAL
ANNOTATION COMMENT IMP: NORMAL
BARBITURATES UR QL: NOT DETECTED
BUPRENORPHINE UR QL: NOT DETECTED
BZE UR QL: NOT DETECTED
CARBOXYTHC UR QL: NOT DETECTED
CARISOPRODOL UR QL: NOT DETECTED
CLONAZEPAM UR QL: NOT DETECTED
CODEINE UR QL: NOT DETECTED
CREAT UR-MCNC: 160.4 MG/DL (ref 20–400)
DIAZEPAM UR QL: NOT DETECTED
ETHYL GLUCURONIDE UR QL: NOT DETECTED
FENTANYL UR QL: NOT DETECTED
GABAPENTIN: NOT DETECTED
HYDROCODONE UR QL: NOT DETECTED
HYDROMORPHONE UR QL: NOT DETECTED
LORAZEPAM UR QL: NOT DETECTED
MDA UR QL: NOT DETECTED
MDEA UR QL: NOT DETECTED
MDMA UR QL: NOT DETECTED
ME-PHENIDATE UR QL: NOT DETECTED
METHADONE UR QL: NOT DETECTED
METHAMPHET UR QL: NOT DETECTED
MIDAZOLAM UR QL SCN: NOT DETECTED
MORPHINE UR QL: NOT DETECTED
NALOXONE: NOT DETECTED
NORBUPRENORPHINE UR QL CFM: NOT DETECTED
NORDIAZEPAM UR QL: NOT DETECTED
NORFENTANYL UR QL: NOT DETECTED
NORHYDROCODONE UR QL CFM: NOT DETECTED
NORMEPERIDINE UR QL CFM: NOT DETECTED
NOROXYCODONE UR QL CFM: NOT DETECTED
NOROXYMORPHONE UR QL SCN: NOT DETECTED
OXAZEPAM UR QL: NOT DETECTED
OXYCODONE UR QL: NOT DETECTED
OXYMORPHONE UR QL: NOT DETECTED
PATHOLOGY STUDY: NORMAL
PCP UR QL: NOT DETECTED
PHENTERMINE UR QL: NOT DETECTED
PREGABALIN: NOT DETECTED
SERVICE CMNT-IMP: NORMAL
TAPENTADOL UR QL SCN: NOT DETECTED
TAPENTADOL UR QL SCN: NOT DETECTED
TEMAZEPAM UR QL: NOT DETECTED
TRAMADOL UR QL: NOT DETECTED
ZOLPIDEM METABOLITE: NOT DETECTED
ZOLPIDEM UR QL: NOT DETECTED

## 2022-07-12 ENCOUNTER — HOSPITAL ENCOUNTER (OUTPATIENT)
Facility: AMBULARY SURGERY CENTER | Age: 52
Discharge: HOME | End: 2022-07-12
Attending: ANESTHESIOLOGY | Admitting: ANESTHESIOLOGY
Payer: OTHER GOVERNMENT

## 2022-07-12 VITALS
SYSTOLIC BLOOD PRESSURE: 144 MMHG | BODY MASS INDEX: 26.44 KG/M2 | WEIGHT: 179 LBS | OXYGEN SATURATION: 94 % | RESPIRATION RATE: 18 BRPM | HEART RATE: 71 BPM | DIASTOLIC BLOOD PRESSURE: 89 MMHG | TEMPERATURE: 97 F

## 2022-07-12 DIAGNOSIS — M54.16 LUMBAR RADICULITIS: ICD-10-CM

## 2022-07-12 PROCEDURE — 64483 NJX AA&/STRD TFRM EPI L/S 1: CPT | Mod: 50,,, | Performed by: ANESTHESIOLOGY

## 2022-07-12 PROCEDURE — 64483 NJX AA&/STRD TFRM EPI L/S 1: CPT | Mod: RT | Performed by: ANESTHESIOLOGY

## 2022-07-12 PROCEDURE — 64483 PR EPIDURAL INJ, ANES/STEROID, TRANSFORAMINAL, LUMB/SACR, SNGL LEVL: ICD-10-PCS | Mod: 50,,, | Performed by: ANESTHESIOLOGY

## 2022-07-12 RX ORDER — BUPIVACAINE HYDROCHLORIDE 2.5 MG/ML
INJECTION, SOLUTION EPIDURAL; INFILTRATION; INTRACAUDAL
Status: DISCONTINUED | OUTPATIENT
Start: 2022-07-12 | End: 2022-07-12 | Stop reason: HOSPADM

## 2022-07-12 RX ORDER — LIDOCAINE HYDROCHLORIDE 10 MG/ML
INJECTION, SOLUTION EPIDURAL; INFILTRATION; INTRACAUDAL; PERINEURAL
Status: DISCONTINUED | OUTPATIENT
Start: 2022-07-12 | End: 2022-07-12 | Stop reason: HOSPADM

## 2022-07-12 RX ORDER — DEXAMETHASONE SODIUM PHOSPHATE 10 MG/ML
INJECTION INTRAMUSCULAR; INTRAVENOUS
Status: DISCONTINUED | OUTPATIENT
Start: 2022-07-12 | End: 2022-07-12 | Stop reason: HOSPADM

## 2022-07-12 RX ORDER — SODIUM CHLORIDE, SODIUM LACTATE, POTASSIUM CHLORIDE, CALCIUM CHLORIDE 600; 310; 30; 20 MG/100ML; MG/100ML; MG/100ML; MG/100ML
INJECTION, SOLUTION INTRAVENOUS ONCE AS NEEDED
Status: ACTIVE | OUTPATIENT
Start: 2022-07-12 | End: 2033-12-08

## 2022-07-12 NOTE — OP NOTE
PROCEDURE DATE: 7/12/2022    PROCEDURE: Bilateral L4-5 transforaminal epidural steroid injection under fluoroscopy    DIAGNOSIS: Lumbar radiculitis    Post op diagnosis: Same    PHYSICIAN: Fidencio Vazquez MD    MEDICATIONS INJECTED:  Dexamethasone 5mg (0.5ml) and 1.5ml 0.25% bupivicaine at each nerve root.     LOCAL ANESTHETIC INJECTED:  Lidocaine 1%. 2 ml per site.    SEDATION MEDICATIONS: RN IV sedation    ESTIMATED BLOOD LOSS:  None    COMPLICATIONS:  None    TECHNIQUE:   A time-out was taken to identify patient and procedure side prior to starting the procedure. The patient was placed in a prone position, prepped and draped in the usual sterile fashion using ChloraPrep and sterile towels.  The area to be injected was determined under fluoroscopic guidance in AP and oblique view.  Local anesthetic was given by raising a wheal and going down to the hub of a 25-gauge 1.5 inch needle.  In oblique view, a 3.5 inch 22-gauge bent-tip spinal needle was introduced towards 6 oclock position of the pedicle of each above named nerve root level.  The needle was walked medially then hinged into the neural foramen and position was confirmed in AP and lateral views.  1ml contrast dye was injected to confirm appropriate placement and that there was no vascular uptake.  After negative aspiration for blood or CSF, the medication was then injected. This was performed at the bilateral L4-5 level(s). The patient tolerated the procedure well.    The patient was monitored after the procedure.  Patient was given post procedure and discharge instructions to follow at home. The patient was discharged in a stable condition.

## 2022-07-12 NOTE — PLAN OF CARE
Discharge instructions given to pt, verbalized understanding.  Tolerating PO fluids.  No c/o pain.  Ambulating out to wife  per RN without c/o weakness in no distress.

## 2022-07-12 NOTE — DISCHARGE SUMMARY
[facility]  Discharge Note  Short Stay    Procedure(s) (LRB):  INJECTION, STEROID, EPIDURAL, TRANSFORAMINAL APPROACH (Bilateral)    OUTCOME: Patient tolerated treatment/procedure well without complication and is now ready for discharge.    DISPOSITION: Home or Self Care    FINAL DIAGNOSIS:  <principal problem not specified>    FOLLOWUP: In clinic    DISCHARGE INSTRUCTIONS:    Discharge Procedure Orders   Notify your health care provider if you experience any of the following:  temperature >100.4     Notify your health care provider if you experience any of the following:  severe uncontrolled pain     Notify your health care provider if you experience any of the following:  redness, tenderness, or signs of infection (pain, swelling, redness, odor or green/yellow discharge around incision site)     Activity as tolerated        TIME SPENT ON DISCHARGE: 20 minutes

## 2022-09-30 ENCOUNTER — OFFICE VISIT (OUTPATIENT)
Dept: PAIN MEDICINE | Facility: CLINIC | Age: 52
End: 2022-09-30
Payer: OTHER GOVERNMENT

## 2022-09-30 VITALS
WEIGHT: 179 LBS | SYSTOLIC BLOOD PRESSURE: 144 MMHG | HEIGHT: 69 IN | HEART RATE: 81 BPM | BODY MASS INDEX: 26.51 KG/M2 | DIASTOLIC BLOOD PRESSURE: 96 MMHG

## 2022-09-30 DIAGNOSIS — M54.16 LUMBAR RADICULITIS: ICD-10-CM

## 2022-09-30 DIAGNOSIS — M51.36 DDD (DEGENERATIVE DISC DISEASE), LUMBAR: ICD-10-CM

## 2022-09-30 DIAGNOSIS — M96.1 POSTLAMINECTOMY SYNDROME OF LUMBAR REGION: Primary | ICD-10-CM

## 2022-09-30 PROCEDURE — 99214 PR OFFICE/OUTPT VISIT, EST, LEVL IV, 30-39 MIN: ICD-10-PCS | Mod: S$PBB,,, | Performed by: PHYSICIAN ASSISTANT

## 2022-09-30 PROCEDURE — 99213 OFFICE O/P EST LOW 20 MIN: CPT | Mod: PBBFAC,PN | Performed by: PHYSICIAN ASSISTANT

## 2022-09-30 PROCEDURE — 99214 OFFICE O/P EST MOD 30 MIN: CPT | Mod: S$PBB,,, | Performed by: PHYSICIAN ASSISTANT

## 2022-09-30 PROCEDURE — 99999 PR PBB SHADOW E&M-EST. PATIENT-LVL III: ICD-10-PCS | Mod: PBBFAC,,, | Performed by: PHYSICIAN ASSISTANT

## 2022-09-30 PROCEDURE — 99999 PR PBB SHADOW E&M-EST. PATIENT-LVL III: CPT | Mod: PBBFAC,,, | Performed by: PHYSICIAN ASSISTANT

## 2022-09-30 RX ORDER — TRAMADOL HYDROCHLORIDE 50 MG/1
50 TABLET ORAL NIGHTLY
Qty: 30 TABLET | Refills: 0 | Status: SHIPPED | OUTPATIENT
Start: 2022-09-30 | End: 2022-10-29

## 2022-09-30 NOTE — PROGRESS NOTES
Referring Physician: No ref. provider found    PCP: Sara Quan NP      CC:  Neck and lower back pain    Interval History:  Marshall Barboza is a 50 y.o. male with chronic neck and low back pain who presents today for f/u and medication refill. He is s/p repeat  L4-5 TF DEMETRIA that provided 60% relief x > 2 months. Previous provided 90% relief. Reports low back pain that radiates down bilateral lateral thighs.  Terminates at knee. This has returned to prior intensity and frequency. Denies any weakness, b/b changes, or foot drop. Does not have any symptoms below the knee. He take Tramadol sparingly. Neck pain has improved in past s/p cervical DEMETRIA.  He has a history of C4-C7 ACDF.  Denies dropping objects or hand writing changes. Pain today is rated 7/10.     Prior HPI:   Marshall Barboza is a 52 y.o. male referred to us for neck and lower back pain.  Patient is a former active  personnel.  He has significant history of cervical and lumbar spine surgery.  He underwent L4-5 laminotomy and diskectomy in February 2015.  Radicular pain improved but he continues to have lower back pain.  He also was found to have cervical DDD.  He underwent a C4-C7 ACDF.  Lower back and right leg pain is more bothersome currently.  He has constant aching, throbbing pain in his lower back.  Pain radiates to down his right buttock into his right calf.  Pain occasionally travels down the left calf.  He has had lumbar MB RFA procedures in the past at the  with short-lived relief.  He has undergone lumbar ESIwith moderate benefit for 3 months.  He currently takes tramadol very sparingly with mild-to-moderate benefit.  He denies any worsening weakness.  No bowel bladder changes.    Interventional History:  7/12/22 b/l TF DEMETRIA at L4-5 60% relief.   12/28/21 Cervical DEMETRIA >40% relief  11/9/21 b/l TF DEMETRIA at L4-5 >40% relief   B/L TF DEMETRIA L4-5 on 1/15/21 >90% relief x 6 months  B/L TF DEMETRIA L4-5 on 11/9/21 >40% relief      ROS:  CONSTITUTIONAL: No fevers, chills, night sweats, wt. loss, appetite changes  SKIN: no rashes or itching  ENT: No headaches, head trauma, vision changes, or eye pain  LYMPH NODES: None noticed   CV: No chest pain, palpitations.   RESP: No shortness of breath, dyspnea on exertion, cough, wheezing, or hemoptysis  GI: No nausea, emesis, diarrhea, constipation, melena, hematochezia, pain.    : No dysuria, hematuria, urgency, or frequency   HEME: No easy bruising, bleeding problems  PSYCHIATRIC: No depression, anxiety, psychosis, hallucinations.  NEURO: No seizures, memory loss, dizziness or difficulty sleeping  MSK:  Positive per HPI    Past Medical History:   Diagnosis Date    Allergy     Arthritis     Depression     Hypertension     Lumbar radiculitis 8/2/2018    LIZET on CPAP      Past Surgical History:   Procedure Laterality Date    BREAST SURGERY      EPIDURAL STEROID INJECTION INTO CERVICAL SPINE N/A 12/28/2021    Procedure: Injection-steroid-epidural-cervical;  Surgeon: Fidencio Vazquez MD;  Location: [facility] OR;  Service: Pain Management;  Laterality: N/A;  C7-T1     FRACTURE SURGERY      HERNIA REPAIR      SPINE SURGERY      TRANSFORAMINAL EPIDURAL INJECTION OF STEROID Bilateral 3/12/2019    Procedure: Injection,steroid,epidural,transforaminal approach L4-5;  Surgeon: Fidencio Vazquez MD;  Location: [facility] OR;  Service: Pain Management;  Laterality: Bilateral;    TRANSFORAMINAL EPIDURAL INJECTION OF STEROID Bilateral 8/9/2019    Procedure: Injection,steroid,epidural,transforaminal approach;  Surgeon: Fidencio Vazquez MD;  Location: [facility] OR;  Service: Pain Management;  Laterality: Bilateral;  L4-5    TRANSFORAMINAL EPIDURAL INJECTION OF STEROID Bilateral 11/15/2019    Procedure: Injection,steroid,epidural,transforaminal approach;  Surgeon: Fidencio Vazquez MD;  Location: [facility] OR;  Service: Pain Management;  Laterality: Bilateral;  L4-L5    TRANSFORAMINAL EPIDURAL INJECTION OF STEROID Bilateral 2/18/2020    Procedure:  "Injection,steroid,epidural,transforaminal approach;  Surgeon: Fidencio Vazquez MD;  [facility];  Service: Pain Management;  Laterality: Bilateral;  L4-5    TRANSFORAMINAL EPIDURAL INJECTION OF STEROID Bilateral 7/29/2020    Procedure: Injection,steroid,epidural,transforaminal approach;  Surgeon: Fidencio Vazquez MD;  [facility];  Service: Pain Management;  Laterality: Bilateral;  L4-5    TRANSFORAMINAL EPIDURAL INJECTION OF STEROID Bilateral 1/15/2021    Procedure: Injection,steroid,epidural,transforaminal approach BILATERAL L4-5;  Surgeon: Fidencio Vazquez MD;  [facility];  Service: Pain Management;  Laterality: Bilateral;    TRANSFORAMINAL EPIDURAL INJECTION OF STEROID Bilateral 11/9/2021    Procedure: Injection,steroid,epidural,transforaminal approach;  Surgeon: Fidencio Vazquez MD;  [facility];  Service: Pain Management;  Laterality: Bilateral;  L4-5    TRANSFORAMINAL EPIDURAL INJECTION OF STEROID Bilateral 7/12/2022    Procedure: INJECTION, STEROID, EPIDURAL, TRANSFORAMINAL APPROACH;  Surgeon: Fidencio Vazquez MD;  [facility];  Service: Pain Management;  Laterality: Bilateral;  L4-5, L5-S1     No family history on file.  Social History     Socioeconomic History    Marital status:    Tobacco Use    Smoking status: Never    Smokeless tobacco: Never   Substance and Sexual Activity    Alcohol use: Yes     Alcohol/week: 1.0 standard drink     Types: 1 Cans of beer per week    Drug use: No    Sexual activity: Yes       Medications/Allergies: See med card    Vitals:    09/30/22 0943   BP: (!) 144/96   Pulse: 81   Weight: 81.2 kg (179 lb)   Height: 5' 9" (1.753 m)   PainSc:   7   PainLoc: Back       Physical exam:    GENERAL: A and O x3, the patient appears well groomed and is in no acute distress.  Skin: No rashes or obvious lesions  HEENT: normocephalic, atraumatic  CARDIOVASCULAR:  RRR  LUNGS: non labored breathing  ABDOMEN: soft, nontender   UPPER EXTREMITIES: Normal alignment, normal range of " motion, no atrophy, no skin changes,  hair growth and nail growth normal and equal bilaterally. No swelling, no tenderness.    LOWER EXTREMITIES:  Normal alignment, normal range of motion, no atrophy, no skin changes,  hair growth and nail growth normal and equal bilaterally. No swelling, no tenderness.  CERVICAL SPINE:  Cervical spine: ROM is limited in flexion, extension and lateral rotation with moderate increased pain.  Spurling's maneuver causes no neck pain to either side.  Myofascial exam: No Tenderness to palpation across cervical paraspinous region bilaterally.    LUMBAR SPINE  Lumbar spine: ROM is full with flexion extension and oblique extension with moderate increased pain.    Darrel's test causes no increased pain on either side.    Supine straight leg raise is negative   Internal and external rotation of the hip causes no increased pain on either side.  Myofascial exam: No tenderness to palpation across lumbar paraspinous muscles.      MENTAL STATUS: normal orientation, speech, language, and fund of knowledge for social situation.  Emotional state appropriate.    CRANIAL NERVES:  II:  PERRL bilaterally,   III,IV,VI: EOMI.    V:  Facial sensation equal bilaterally  VII:  Facial motor function normal.  VIII:  Hearing equal to finger rub bilaterally  IX/X: Gag normal, palate symmetric  XI:  Shoulder shrug equal, head turn equal  XII:  Tongue midline without fasciculations      MOTOR: Tone and bulk: normal bilateral upper and lower Strength: normal   Delt Bi Tri WE WF     R 5 5 5 5 5 5   L 5 5 5 5 5 5     IP ADD ABD Quad TA Gas HAM  R 5 5 5 5 5 5 5  L 5 5 5 5 5 5 5    SENSATION: Light touch and pinprick intact bilaterally  REFLEXES: normal, symmetric, nonbrisk.  Toes down, no clonus. No hoffmans.  GAIT:  Uses cane for assistance     Imaging:  MRI lumbar spine 01/2016 (outside records)  Lumbar spondylosis with small posterior disc protrusion L4-5 and L5-S1 with moderate bilateral neuroforaminal  narrowing at L4-5.  Has postoperative changes of a laminotomy at L4-5 noted.    MRI cervical spine 05/2015  Postoperative changes of total disc arthroplasty at C4-5 anterior fusion with interbody spaces at C5-6 and C6-7.    Assessment:  Marshall Barboza is a 52 y.o. male with neck and lower back pain  1. Postlaminectomy syndrome of lumbar region    2. DDD (degenerative disc disease), lumbar    3. Lumbar radiculitis        Plan:  1. I have stressed the importance of physical activity and exercise to improve overall health  2. Monitor progress from repeat lumbar epidural steroid injection bilaterally at the L4-5    3. Consider repeat cervical DEMETRIA if neck pain worsens  4. He continue tramadol 50 mg q day as needed.  He takes this very sparingly. Prescription sent to pharmacy.  Previous UDS consistent.   5.  Follow up with PCP regarding elevated BP  6.  Follow-up 3 months   All medication management was performed by Dr. Fidencio Vazquez

## 2023-01-18 ENCOUNTER — PATIENT MESSAGE (OUTPATIENT)
Dept: PAIN MEDICINE | Facility: CLINIC | Age: 53
End: 2023-01-18
Payer: OTHER GOVERNMENT

## 2023-01-19 ENCOUNTER — OFFICE VISIT (OUTPATIENT)
Dept: PAIN MEDICINE | Facility: CLINIC | Age: 53
End: 2023-01-19
Payer: OTHER GOVERNMENT

## 2023-01-19 VITALS
SYSTOLIC BLOOD PRESSURE: 131 MMHG | BODY MASS INDEX: 26.51 KG/M2 | HEIGHT: 69 IN | WEIGHT: 179 LBS | DIASTOLIC BLOOD PRESSURE: 89 MMHG | HEART RATE: 69 BPM

## 2023-01-19 DIAGNOSIS — M51.36 DDD (DEGENERATIVE DISC DISEASE), LUMBAR: ICD-10-CM

## 2023-01-19 DIAGNOSIS — M96.1 POSTLAMINECTOMY SYNDROME OF LUMBAR REGION: Primary | ICD-10-CM

## 2023-01-19 DIAGNOSIS — M54.16 LUMBAR RADICULITIS: ICD-10-CM

## 2023-01-19 PROCEDURE — 99999 PR PBB SHADOW E&M-EST. PATIENT-LVL IV: CPT | Mod: PBBFAC,,, | Performed by: PHYSICIAN ASSISTANT

## 2023-01-19 PROCEDURE — 99999 PR PBB SHADOW E&M-EST. PATIENT-LVL IV: ICD-10-PCS | Mod: PBBFAC,,, | Performed by: PHYSICIAN ASSISTANT

## 2023-01-19 PROCEDURE — 99214 OFFICE O/P EST MOD 30 MIN: CPT | Mod: S$PBB,,, | Performed by: PHYSICIAN ASSISTANT

## 2023-01-19 PROCEDURE — 99214 OFFICE O/P EST MOD 30 MIN: CPT | Mod: PBBFAC,PN | Performed by: PHYSICIAN ASSISTANT

## 2023-01-19 PROCEDURE — 99214 PR OFFICE/OUTPT VISIT, EST, LEVL IV, 30-39 MIN: ICD-10-PCS | Mod: S$PBB,,, | Performed by: PHYSICIAN ASSISTANT

## 2023-01-19 RX ORDER — TRAMADOL HYDROCHLORIDE 50 MG/1
50 TABLET ORAL EVERY 8 HOURS PRN
COMMUNITY
End: 2023-01-19 | Stop reason: SDUPTHER

## 2023-01-19 RX ORDER — TRAMADOL HYDROCHLORIDE 50 MG/1
50 TABLET ORAL EVERY 8 HOURS PRN
Qty: 30 TABLET | Refills: 0 | Status: SHIPPED | OUTPATIENT
Start: 2023-01-19 | End: 2023-04-20 | Stop reason: SDUPTHER

## 2023-01-19 NOTE — PROGRESS NOTES
Referring Physician: No ref. provider found    PCP: Sara Quan NP      CC:  Neck and lower back pain    Interval History:  Marshall Barboza is a 50 y.o. male with chronic neck and low back pain who presents today for f/u and medication refill. Repeat  L4-5 TF DEMETRIA  provided 60% relief x > 2 months. Previous provided 90% relief. Reports low back pain that radiates down bilateral lateral thighs.  Terminates at knee. This has returned to prior intensity and frequency. Denies any weakness, b/b changes, or foot drop. Does not have any symptoms below the knee. He take Tramadol sparingly. Neck pain has improved in past s/p cervical DEMETRIA.  He has a history of C4-C7 ACDF.  Denies dropping objects or hand writing changes. Pain today is rated 8/10.     Prior HPI:   Marshall Barboza is a 52 y.o. male referred to us for neck and lower back pain.  Patient is a former active  personnel.  He has significant history of cervical and lumbar spine surgery.  He underwent L4-5 laminotomy and diskectomy in February 2015.  Radicular pain improved but he continues to have lower back pain.  He also was found to have cervical DDD.  He underwent a C4-C7 ACDF.  Lower back and right leg pain is more bothersome currently.  He has constant aching, throbbing pain in his lower back.  Pain radiates to down his right buttock into his right calf.  Pain occasionally travels down the left calf.  He has had lumbar MB RFA procedures in the past at the  with short-lived relief.  He has undergone lumbar ESIwith moderate benefit for 3 months.  He currently takes tramadol very sparingly with mild-to-moderate benefit.  He denies any worsening weakness.  No bowel bladder changes.    Interventional History:  7/12/22 b/l TF DEMETRIA at L4-5 60% relief.   12/28/21 Cervical DEMETRIA >40% relief  11/9/21 b/l TF DEMETRIA at L4-5 >40% relief   B/L TF DEMETRIA L4-5 on 1/15/21 >90% relief x 6 months  B/L TF DEMETRIA L4-5 on 11/9/21 >40% relief      ROS:  CONSTITUTIONAL: No fevers, chills, night sweats, wt. loss, appetite changes  SKIN: no rashes or itching  ENT: No headaches, head trauma, vision changes, or eye pain  LYMPH NODES: None noticed   CV: No chest pain, palpitations.   RESP: No shortness of breath, dyspnea on exertion, cough, wheezing, or hemoptysis  GI: No nausea, emesis, diarrhea, constipation, melena, hematochezia, pain.    : No dysuria, hematuria, urgency, or frequency   HEME: No easy bruising, bleeding problems  PSYCHIATRIC: No depression, anxiety, psychosis, hallucinations.  NEURO: No seizures, memory loss, dizziness or difficulty sleeping  MSK:  Positive per HPI    Past Medical History:   Diagnosis Date    Allergy     Arthritis     Depression     Hypertension     Lumbar radiculitis 8/2/2018    LIZET on CPAP      Past Surgical History:   Procedure Laterality Date    BREAST SURGERY      EPIDURAL STEROID INJECTION INTO CERVICAL SPINE N/A 12/28/2021    Procedure: Injection-steroid-epidural-cervical;  Surgeon: Fidencio Vazquez MD;  Location: [facility] OR;  Service: Pain Management;  Laterality: N/A;  C7-T1     FRACTURE SURGERY      HERNIA REPAIR      SPINE SURGERY      TRANSFORAMINAL EPIDURAL INJECTION OF STEROID Bilateral 3/12/2019    Procedure: Injection,steroid,epidural,transforaminal approach L4-5;  Surgeon: Fidencio Vazquez MD;  Location: [facility] OR;  Service: Pain Management;  Laterality: Bilateral;    TRANSFORAMINAL EPIDURAL INJECTION OF STEROID Bilateral 8/9/2019    Procedure: Injection,steroid,epidural,transforaminal approach;  Surgeon: Fidencio Vazquez MD;  Location: [facility] OR;  Service: Pain Management;  Laterality: Bilateral;  L4-5    TRANSFORAMINAL EPIDURAL INJECTION OF STEROID Bilateral 11/15/2019    Procedure: Injection,steroid,epidural,transforaminal approach;  Surgeon: Fidencio Vazquez MD;  Location: [facility] OR;  Service: Pain Management;  Laterality: Bilateral;  L4-L5    TRANSFORAMINAL EPIDURAL INJECTION OF STEROID Bilateral 2/18/2020    Procedure:  "Injection,steroid,epidural,transforaminal approach;  Surgeon: Fidencio Vazquez MD;  Location: [facility] OR;  Service: Pain Management;  Laterality: Bilateral;  L4-5    TRANSFORAMINAL EPIDURAL INJECTION OF STEROID Bilateral 7/29/2020    Procedure: Injection,steroid,epidural,transforaminal approach;  Surgeon: Fidencio Vazquez MD;  Location: [facility] OR;  Service: Pain Management;  Laterality: Bilateral;  L4-5    TRANSFORAMINAL EPIDURAL INJECTION OF STEROID Bilateral 1/15/2021    Procedure: Injection,steroid,epidural,transforaminal approach BILATERAL L4-5;  Surgeon: Fidencio Vazquez MD;  Location: [facility] OR;  Service: Pain Management;  Laterality: Bilateral;    TRANSFORAMINAL EPIDURAL INJECTION OF STEROID Bilateral 11/9/2021    Procedure: Injection,steroid,epidural,transforaminal approach;  Surgeon: Fidencio Vazquez MD;  Location: [facility] OR;  Service: Pain Management;  Laterality: Bilateral;  L4-5    TRANSFORAMINAL EPIDURAL INJECTION OF STEROID Bilateral 7/12/2022    Procedure: INJECTION, STEROID, EPIDURAL, TRANSFORAMINAL APPROACH;  Surgeon: Fidencio Vazquez MD;  Location: [facility] OR;  Service: Pain Management;  Laterality: Bilateral;  L4-5, L5-S1     No family history on file.  Social History     Socioeconomic History    Marital status:    Tobacco Use    Smoking status: Never    Smokeless tobacco: Never   Substance and Sexual Activity    Alcohol use: Yes     Alcohol/week: 1.0 standard drink     Types: 1 Cans of beer per week    Drug use: No    Sexual activity: Yes       Medications/Allergies: See med card    Vitals:    01/19/23 0819   BP: 131/89   Pulse: 69   Weight: 81.2 kg (179 lb)   Height: 5' 9" (1.753 m)   PainSc:   8   PainLoc: Back       Physical exam:    GENERAL: A and O x3, the patient appears well groomed and is in no acute distress.  Skin: No rashes or obvious lesions  HEENT: normocephalic, atraumatic  CARDIOVASCULAR:  RRR  LUNGS: non labored breathing  ABDOMEN: soft, nontender   UPPER EXTREMITIES: Normal alignment, normal range of motion, " no atrophy, no skin changes,  hair growth and nail growth normal and equal bilaterally. No swelling, no tenderness.    LOWER EXTREMITIES:  Normal alignment, normal range of motion, no atrophy, no skin changes,  hair growth and nail growth normal and equal bilaterally. No swelling, no tenderness.  CERVICAL SPINE:  Cervical spine: ROM is limited in flexion, extension and lateral rotation with moderate increased pain.  Spurling's maneuver causes no neck pain to either side.  Myofascial exam: No Tenderness to palpation across cervical paraspinous region bilaterally.    LUMBAR SPINE  Lumbar spine: ROM is full with flexion extension and oblique extension with moderate increased pain.    Darrel's test causes no increased pain on either side.    Supine straight leg raise is negative   Internal and external rotation of the hip causes no increased pain on either side.  Myofascial exam: No tenderness to palpation across lumbar paraspinous muscles.      MENTAL STATUS: normal orientation, speech, language, and fund of knowledge for social situation.  Emotional state appropriate.    CRANIAL NERVES:  II:  PERRL bilaterally,   III,IV,VI: EOMI.    V:  Facial sensation equal bilaterally  VII:  Facial motor function normal.  VIII:  Hearing equal to finger rub bilaterally  IX/X: Gag normal, palate symmetric  XI:  Shoulder shrug equal, head turn equal  XII:  Tongue midline without fasciculations      MOTOR: Tone and bulk: normal bilateral upper and lower Strength: normal   Delt Bi Tri WE WF     R 5 5 5 5 5 5   L 5 5 5 5 5 5     IP ADD ABD Quad TA Gas HAM  R 5 5 5 5 5 5 5  L 5 5 5 5 5 5 5    SENSATION: Light touch and pinprick intact bilaterally  REFLEXES: normal, symmetric, nonbrisk.  Toes down, no clonus. No hoffmans.  GAIT:  Uses cane for assistance     Imaging:  MRI lumbar spine 01/2016 (outside records)  Lumbar spondylosis with small posterior disc protrusion L4-5 and L5-S1 with moderate bilateral neuroforaminal narrowing at  L4-5.  Has postoperative changes of a laminotomy at L4-5 noted.    MRI cervical spine 05/2015  Postoperative changes of total disc arthroplasty at C4-5 anterior fusion with interbody spaces at C5-6 and C6-7.    Assessment:  Marshall Barboza is a 52 y.o. male with neck and lower back pain  1. Postlaminectomy syndrome of lumbar region    2. DDD (degenerative disc disease), lumbar    3. Lumbar radiculitis        Plan:  1. I have stressed the importance of physical activity and exercise to improve overall health  2. Schedule repeat lumbar epidural steroid injection bilaterally at the L4-5. I have explained the risks, benefits, and alternatives of the procedure in detail. The patient voices understanding and all questions have been answered. The patient agrees to proceed as planned. Written Consent obtained.   3. Consider repeat cervical DEMETRIA if neck pain worsens  4. He continue tramadol 50 mg q day as needed.  He takes this very sparingly. Prescription sent to pharmacy.  Previous UDS consistent.   5. Follow-up 3 months   All medication management was performed by Dr. Fidencio Vazquez

## 2023-01-25 ENCOUNTER — TELEPHONE (OUTPATIENT)
Dept: PAIN MEDICINE | Facility: CLINIC | Age: 53
End: 2023-01-25
Payer: OTHER GOVERNMENT

## 2023-01-25 DIAGNOSIS — M54.16 LUMBAR RADICULITIS: Primary | ICD-10-CM

## 2023-01-25 NOTE — TELEPHONE ENCOUNTER
Order Date:1/25/2023      Ordering User:KAYLEIGH PAVON [[id number]]   Encounter Provider:Kayleigh Pavon PA-C [[id number]]   Authorizing Provider: Kayleigh Pavon PA-C [[id number]]   Supervising Provider:KIMBERLY FROST [[id number]]   Type of Supervision:Supervision Required   Department:[facility] PAIN MANAGEMENT[[id number]]      Common Order Information   Procedure -> Transforaminal Injection (Specify level and laterality) Cmt: L4-5              and L5-S1      Pre-op D   iagnosis -> Lumbar radiculitis       Order Specific Information   Order: Procedure Order to Pain Management [Custom: [id number]]  Order #:           [id number]: 1 FUTURE     Priority: Routine  Class: Clinic Performed     Future Order Information       Expires on:01/25/2024            Expected by:01/25/2023                    Associated Diagnoses       M54.16 Lumbar radiculitis       Physician -> devon           Fa   cility Name: -> [facility]           Follow-up: -> 4 weeks               Priority: Routine  Class: Clinic Performed     Future Order Information       Expires on:01/25/2024            Expected by:01/25/2023                    Associated Diagnoses       M54.16 Lumbar radiculitis       Procedure -> Transforaminal Injection (Specify level and laterality) Cmt:                  L4-5 and L5-S1          Physician -> devon           Pre-op Diag   nosis -> Lumbar radiculitis           Facility Name: -> [facility]           Follow-up: -> 4 weeks    Scheduled 2/28/23, hold motrin 3 days prior

## 2023-02-28 ENCOUNTER — HOSPITAL ENCOUNTER (OUTPATIENT)
Facility: AMBULARY SURGERY CENTER | Age: 53
Discharge: HOME | End: 2023-02-28
Attending: ANESTHESIOLOGY | Admitting: ANESTHESIOLOGY
Payer: OTHER GOVERNMENT

## 2023-02-28 VITALS
OXYGEN SATURATION: 97 % | TEMPERATURE: 98 F | DIASTOLIC BLOOD PRESSURE: 99 MMHG | RESPIRATION RATE: 18 BRPM | SYSTOLIC BLOOD PRESSURE: 150 MMHG | WEIGHT: 179 LBS | HEIGHT: 69 IN | BODY MASS INDEX: 26.51 KG/M2 | HEART RATE: 71 BPM

## 2023-02-28 DIAGNOSIS — M54.16 LUMBAR RADICULITIS: ICD-10-CM

## 2023-02-28 PROCEDURE — 64483 NJX AA&/STRD TFRM EPI L/S 1: CPT | Mod: 50,,, | Performed by: ANESTHESIOLOGY

## 2023-02-28 PROCEDURE — 64484 NJX AA&/STRD TFRM EPI L/S EA: CPT | Mod: 50,,, | Performed by: ANESTHESIOLOGY

## 2023-02-28 PROCEDURE — 64484 PRA INJECT ANES/STEROID FORAMEN LUMBAR/SACRAL W IMG GUIDE ,EA ADD LEVEL: ICD-10-PCS | Mod: 50,,, | Performed by: ANESTHESIOLOGY

## 2023-02-28 PROCEDURE — 64483 NJX AA&/STRD TFRM EPI L/S 1: CPT | Mod: LT | Performed by: ANESTHESIOLOGY

## 2023-02-28 PROCEDURE — 64483 PR EPIDURAL INJ, ANES/STEROID, TRANSFORAMINAL, LUMB/SACR, SNGL LEVL: ICD-10-PCS | Mod: 50,,, | Performed by: ANESTHESIOLOGY

## 2023-02-28 PROCEDURE — 64484 NJX AA&/STRD TFRM EPI L/S EA: CPT | Mod: RT | Performed by: ANESTHESIOLOGY

## 2023-02-28 RX ORDER — DEXAMETHASONE SODIUM PHOSPHATE 10 MG/ML
INJECTION INTRAMUSCULAR; INTRAVENOUS
Status: DISCONTINUED | OUTPATIENT
Start: 2023-02-28 | End: 2023-02-28 | Stop reason: HOSPADM

## 2023-02-28 RX ORDER — LIDOCAINE HYDROCHLORIDE 10 MG/ML
INJECTION, SOLUTION EPIDURAL; INFILTRATION; INTRACAUDAL; PERINEURAL
Status: DISCONTINUED | OUTPATIENT
Start: 2023-02-28 | End: 2023-02-28 | Stop reason: HOSPADM

## 2023-02-28 RX ORDER — BUPIVACAINE HYDROCHLORIDE 2.5 MG/ML
INJECTION, SOLUTION EPIDURAL; INFILTRATION; INTRACAUDAL
Status: DISCONTINUED | OUTPATIENT
Start: 2023-02-28 | End: 2023-02-28 | Stop reason: HOSPADM

## 2023-02-28 RX ORDER — SODIUM CHLORIDE, SODIUM LACTATE, POTASSIUM CHLORIDE, CALCIUM CHLORIDE 600; 310; 30; 20 MG/100ML; MG/100ML; MG/100ML; MG/100ML
INJECTION, SOLUTION INTRAVENOUS ONCE AS NEEDED
Status: ACTIVE | OUTPATIENT
Start: 2023-02-28 | End: 2034-07-27

## 2023-02-28 NOTE — H&P
CC: low back pain    HPI: The patient is a 52 y.o. male with a history of low back pain here for DEMETRIA. There are no major changes in history and physical from 1/19/23 by [place].    Past Medical History:   Diagnosis Date    Allergy     Arthritis     Depression     Hypertension     Lumbar radiculitis 8/2/2018    LIZET on CPAP        Past Surgical History:   Procedure Laterality Date    BREAST SURGERY      EPIDURAL STEROID INJECTION INTO CERVICAL SPINE N/A 12/28/2021    Procedure: Injection-steroid-epidural-cervical;  Surgeon: Fidencio Vazquez MD;  [facility];  Service: Pain Management;  Laterality: N/A;  C7-T1     FRACTURE SURGERY      HERNIA REPAIR      SPINE SURGERY      TRANSFORAMINAL EPIDURAL INJECTION OF STEROID Bilateral 3/12/2019    Procedure: Injection,steroid,epidural,transforaminal approach L4-5;  Surgeon: Fidencio Vazquez MD;  [facility];  Service: Pain Management;  Laterality: Bilateral;    TRANSFORAMINAL EPIDURAL INJECTION OF STEROID Bilateral 8/9/2019    Procedure: Injection,steroid,epidural,transforaminal approach;  Surgeon: Fidencio Vazquez MD;  [facility];  Service: Pain Management;  Laterality: Bilateral;  L4-5    TRANSFORAMINAL EPIDURAL INJECTION OF STEROID Bilateral 11/15/2019    Procedure: Injection,steroid,epidural,transforaminal approach;  Surgeon: Fidencio Vazquez MD;  [facility];  Service: Pain Management;  Laterality: Bilateral;  L4-L5    TRANSFORAMINAL EPIDURAL INJECTION OF STEROID Bilateral 2/18/2020    Procedure: Injection,steroid,epidural,transforaminal approach;  Surgeon: Fidencio Vazquez MD;  [facility];  Service: Pain Management;  Laterality: Bilateral;  L4-5    TRANSFORAMINAL EPIDURAL INJECTION OF STEROID Bilateral 7/29/2020    Procedure: Injection,steroid,epidural,transforaminal approach;  Surgeon: Fidencio Vazquez MD;  [facility];  Service: Pain Management;  Laterality: Bilateral;  L4-5    TRANSFORAMINAL EPIDURAL INJECTION OF STEROID Bilateral 1/15/2021    Procedure:  "Injection,steroid,epidural,transforaminal approach BILATERAL L4-5;  Surgeon: Fidencio Vazquez MD;  Location: [facility] OR;  Service: Pain Management;  Laterality: Bilateral;    TRANSFORAMINAL EPIDURAL INJECTION OF STEROID Bilateral 11/9/2021    Procedure: Injection,steroid,epidural,transforaminal approach;  Surgeon: Fidencio Vazquez MD;  Location: [facility] OR;  Service: Pain Management;  Laterality: Bilateral;  L4-5    TRANSFORAMINAL EPIDURAL INJECTION OF STEROID Bilateral 7/12/2022    Procedure: INJECTION, STEROID, EPIDURAL, TRANSFORAMINAL APPROACH;  Surgeon: Fidencio Vazquez MD;  Location: [facility] OR;  Service: Pain Management;  Laterality: Bilateral;  L4-5, L5-S1       History reviewed. No pertinent family history.    Social History     Socioeconomic History    Marital status:    Tobacco Use    Smoking status: Never    Smokeless tobacco: Never   Substance and Sexual Activity    Alcohol use: Yes     Alcohol/week: 1.0 standard drink     Types: 1 Cans of beer per week    Drug use: No    Sexual activity: Yes       Current Facility-Administered Medications   Medication Dose Route Frequency Provider Last Rate Last Admin    lactated ringers infusion   Intravenous Once PRN Fidencio Vazquez MD         Facility-Administered Medications Ordered in Other Encounters   Medication Dose Route Frequency Provider Last Rate Last Admin    lactated ringers infusion   Intravenous Once PRN Fidencio Vazquez MD        lactated ringers infusion   Intravenous Once PRN Fidencio Vazquez MD           Review of patient's allergies indicates:   Allergen Reactions    Pregabalin Other (See Comments)       Vitals:    02/23/23 1439 02/28/23 1226   Temp:  98.4 °F (36.9 °C)   Weight: 81.2 kg (179 lb)    Height: 5' 9" (1.753 m)        REVIEW OF SYSTEMS:     GENERAL: No weight loss, malaise or fevers.  HEENT:  No recent changes in vision or hearing  NECK: Negative for lumps, no difficulty with swallowing.  RESPIRATORY: Negative for cough, wheezing or shortness of breath, patient denies " any recent URI.  CARDIOVASCULAR: Negative for chest pain, leg swelling or palpitations.  GI: Negative for abdominal discomfort, blood in stools or black stools or change in bowel habits.  MUSCULOSKELETAL: See HPI.  SKIN: Negative for lesions, rash, and itching.  PSYCH: No suicidal or homicidal ideations, no current mood disturbances.  HEMATOLOGY/LYMPHOLOGY: Negative for prolonged bleeding, bruising easily or swollen nodes. Patient is not currently taking any anti-coagulants  ENDO: No history of diabetes or thyroid dysfunction  NEURO: No history of syncope, paralysis, seizures or tremors.All other reviewed and negative other than HPI.    Physical exam:  Gen: A and O x3, pleasant, well-groomed  Skin: No rashes or obvious lesions  HEENT: PERRLA, no obvious deformities on ears or in canals. No thyroid masses, trachea midline, no palpable lymph nodes in neck, axilla.  CVS: Regular rate and rhythm, normal S1 and S2, no murmurs.  Resp: Clear to auscultation bilaterally.  Abdomen: Soft, NT/ND, normal bowel sounds present.  Musculoskeletal/Neuro: Moving all extremities    Assessment:  Lumbar radiculitis  -     Case Request Operating Room: Injection,steroid,epidural,transforaminal approach          PLAN: DEMETRIA      This patient has been cleared for surgery in an ambulatory surgical facility    ASA 3,  Mallampatti Score 3  No history of anesthetic complications  Plan for RN IV sedation

## 2023-02-28 NOTE — OP NOTE
PROCEDURE DATE: 2/28/2023    PROCEDURE: Bilateral L4-5 and L5-S1 transforaminal epidural steroid injection under fluoroscopy    DIAGNOSIS: Lumbar radiculitis    Post op diagnosis: Same    PHYSICIAN: Fidencio Vazquez MD    MEDICATIONS INJECTED:  Dexamethasone 2.5mg and 0.5ml 0.25% bupivicaine at each nerve root.     LOCAL ANESTHETIC INJECTED:  Lidocaine 1%. 1 ml per site.    SEDATION MEDICATIONS: RN IV sedation    ESTIMATED BLOOD LOSS:  nOne    COMPLICATIONS:  nOne    TECHNIQUE:   A time-out was taken to identify patient and procedure side prior to starting the procedure. The patient was placed in a prone position, prepped and draped in the usual sterile fashion using ChloraPrep and sterile towels.  The area to be injected was determined under fluoroscopic guidance in AP and oblique view.  Local anesthetic was given by raising a wheal and going down to the hub of a 25-gauge 1.5 inch needle.  In oblique view, a 3.5 inch 22-gauge bent-tip spinal needle was introduced towards 6 oclock position of the pedicle of each above named nerve root level.  The needle was walked medially then hinged into the neural foramen and position was confirmed in AP and lateral views.  0.5ml contrast dye was injected to confirm appropriate placement and that there was no vascular uptake.  After negative aspiration for blood or CSF, the medication was then injected. This was performed at the bilateral L4-5 and L5-S1 level(s). The patient tolerated the procedure well.    The patient was monitored after the procedure.  Patient was given post procedure and discharge instructions to follow at home. The patient was discharged in a stable condition.

## 2023-02-28 NOTE — DISCHARGE SUMMARY
[facility]  Discharge Note  Short Stay    Procedure(s) (LRB):  Injection,steroid,epidural,transforaminal approach (Bilateral)      OUTCOME: Patient tolerated treatment/procedure well without complication and is now ready for discharge.    DISPOSITION: Home or Self Care    FINAL DIAGNOSIS:  <principal problem not specified>    FOLLOWUP: In clinic    DISCHARGE INSTRUCTIONS:    Discharge Procedure Orders   Notify your health care provider if you experience any of the following:  temperature >100.4     Notify your health care provider if you experience any of the following:  severe uncontrolled pain     Notify your health care provider if you experience any of the following:  redness, tenderness, or signs of infection (pain, swelling, redness, odor or green/yellow discharge around incision site)     Activity as tolerated        TIME SPENT ON DISCHARGE: 30 minutes

## 2023-02-28 NOTE — PLAN OF CARE
Patient states ready for discharge. D/C instructions reviewed with patient. Encouraged to attend f/u appointment, has verbalized understanding.

## 2023-04-20 ENCOUNTER — OFFICE VISIT (OUTPATIENT)
Dept: PAIN MEDICINE | Facility: CLINIC | Age: 53
End: 2023-04-20
Payer: OTHER GOVERNMENT

## 2023-04-20 VITALS — HEIGHT: 69 IN | BODY MASS INDEX: 26.51 KG/M2 | WEIGHT: 179 LBS

## 2023-04-20 DIAGNOSIS — M54.16 LUMBAR RADICULITIS: Primary | ICD-10-CM

## 2023-04-20 DIAGNOSIS — M96.1 POSTLAMINECTOMY SYNDROME OF LUMBAR REGION: ICD-10-CM

## 2023-04-20 DIAGNOSIS — M51.36 DDD (DEGENERATIVE DISC DISEASE), LUMBAR: ICD-10-CM

## 2023-04-20 PROCEDURE — 99214 PR OFFICE/OUTPT VISIT, EST, LEVL IV, 30-39 MIN: ICD-10-PCS | Mod: S$PBB,,, | Performed by: PHYSICIAN ASSISTANT

## 2023-04-20 PROCEDURE — 99999 PR PBB SHADOW E&M-EST. PATIENT-LVL III: CPT | Mod: PBBFAC,,, | Performed by: PHYSICIAN ASSISTANT

## 2023-04-20 PROCEDURE — 99214 OFFICE O/P EST MOD 30 MIN: CPT | Mod: S$PBB,,, | Performed by: PHYSICIAN ASSISTANT

## 2023-04-20 PROCEDURE — 99999 PR PBB SHADOW E&M-EST. PATIENT-LVL III: ICD-10-PCS | Mod: PBBFAC,,, | Performed by: PHYSICIAN ASSISTANT

## 2023-04-20 PROCEDURE — 99213 OFFICE O/P EST LOW 20 MIN: CPT | Mod: PBBFAC,PN | Performed by: PHYSICIAN ASSISTANT

## 2023-04-20 RX ORDER — TRAMADOL HYDROCHLORIDE 50 MG/1
50 TABLET ORAL EVERY 8 HOURS PRN
Qty: 30 TABLET | Refills: 0 | Status: SHIPPED | OUTPATIENT
Start: 2023-04-20 | End: 2023-06-28 | Stop reason: SDUPTHER

## 2023-04-20 NOTE — PROGRESS NOTES
Referring Physician: No ref. provider found    PCP: Sara Quan NP      CC:  Neck and lower back pain    Interval History:  Marshall Barboza is a 50 y.o. male with chronic neck and low back pain who presents today for f/u and medication refill. Repeat  L4-5 TF DEMETRIA  provided 60% relief x > 2 months. Previous provided 90% relief. Reports low back pain that radiates down bilateral lateral thighs.  Terminates at knee. This has returned to prior intensity and frequency. Denies any weakness, b/b changes, or foot drop. Does not have any symptoms below the knee. He take Tramadol sparingly. Neck pain has improved in past s/p cervical DEMETRIA.  He has a history of C4-C7 ACDF.  Denies dropping objects or hand writing changes. Pain today is rated 7/10.     Prior HPI:   Marshall Barboza is a 53 y.o. male referred to us for neck and lower back pain.  Patient is a former active  personnel.  He has significant history of cervical and lumbar spine surgery.  He underwent L4-5 laminotomy and diskectomy in February 2015.  Radicular pain improved but he continues to have lower back pain.  He also was found to have cervical DDD.  He underwent a C4-C7 ACDF.  Lower back and right leg pain is more bothersome currently.  He has constant aching, throbbing pain in his lower back.  Pain radiates to down his right buttock into his right calf.  Pain occasionally travels down the left calf.  He has had lumbar MB RFA procedures in the past at the  with short-lived relief.  He has undergone lumbar ESIwith moderate benefit for 3 months.  He currently takes tramadol very sparingly with mild-to-moderate benefit.  He denies any worsening weakness.  No bowel bladder changes.    Interventional History:  7/12/22 b/l TF DEMETRIA at L4-5 60% relief.   12/28/21 Cervical DEMETRIA >40% relief  11/9/21 b/l TF DEMETRIA at L4-5 >40% relief   B/L TF DEMETRIA L4-5 on 1/15/21 >90% relief x 6 months  B/L TF DEMETRIA L4-5 on 11/9/21 >40% relief      ROS:  CONSTITUTIONAL: No fevers, chills, night sweats, wt. loss, appetite changes  SKIN: no rashes or itching  ENT: No headaches, head trauma, vision changes, or eye pain  LYMPH NODES: None noticed   CV: No chest pain, palpitations.   RESP: No shortness of breath, dyspnea on exertion, cough, wheezing, or hemoptysis  GI: No nausea, emesis, diarrhea, constipation, melena, hematochezia, pain.    : No dysuria, hematuria, urgency, or frequency   HEME: No easy bruising, bleeding problems  PSYCHIATRIC: No depression, anxiety, psychosis, hallucinations.  NEURO: No seizures, memory loss, dizziness or difficulty sleeping  MSK:  Positive per HPI    Past Medical History:   Diagnosis Date    Allergy     Arthritis     Depression     Hypertension     Lumbar radiculitis 8/2/2018    LIZET on CPAP      Past Surgical History:   Procedure Laterality Date    BREAST SURGERY      EPIDURAL STEROID INJECTION INTO CERVICAL SPINE N/A 12/28/2021    Procedure: Injection-steroid-epidural-cervical;  Surgeon: Fidencio Vazquez MD;  Location: [facility] OR;  Service: Pain Management;  Laterality: N/A;  C7-T1     FRACTURE SURGERY      HERNIA REPAIR      SPINE SURGERY      TRANSFORAMINAL EPIDURAL INJECTION OF STEROID Bilateral 3/12/2019    Procedure: Injection,steroid,epidural,transforaminal approach L4-5;  Surgeon: Fidencio Vazquez MD;  Location: [facility] OR;  Service: Pain Management;  Laterality: Bilateral;    TRANSFORAMINAL EPIDURAL INJECTION OF STEROID Bilateral 8/9/2019    Procedure: Injection,steroid,epidural,transforaminal approach;  Surgeon: Fidencio Vazquez MD;  Location: [facility] OR;  Service: Pain Management;  Laterality: Bilateral;  L4-5    TRANSFORAMINAL EPIDURAL INJECTION OF STEROID Bilateral 11/15/2019    Procedure: Injection,steroid,epidural,transforaminal approach;  Surgeon: Fidencio Vazquez MD;  Location: [facility] OR;  Service: Pain Management;  Laterality: Bilateral;  L4-L5    TRANSFORAMINAL EPIDURAL INJECTION OF STEROID Bilateral 2/18/2020    Procedure:  "Injection,steroid,epidural,transforaminal approach;  Surgeon: Fidencio Vazquez MD;  Location: [facility] OR;  Service: Pain Management;  Laterality: Bilateral;  L4-5    TRANSFORAMINAL EPIDURAL INJECTION OF STEROID Bilateral 7/29/2020    Procedure: Injection,steroid,epidural,transforaminal approach;  Surgeon: Fidencio Vazquez MD;  Location: [facility] OR;  Service: Pain Management;  Laterality: Bilateral;  L4-5    TRANSFORAMINAL EPIDURAL INJECTION OF STEROID Bilateral 1/15/2021    Procedure: Injection,steroid,epidural,transforaminal approach BILATERAL L4-5;  Surgeon: Fidencio Vazquez MD;  Location: [facility] OR;  Service: Pain Management;  Laterality: Bilateral;    TRANSFORAMINAL EPIDURAL INJECTION OF STEROID Bilateral 11/9/2021    Procedure: Injection,steroid,epidural,transforaminal approach;  Surgeon: Fidencio Vazquez MD;  Location: [facility] OR;  Service: Pain Management;  Laterality: Bilateral;  L4-5    TRANSFORAMINAL EPIDURAL INJECTION OF STEROID Bilateral 7/12/2022    Procedure: INJECTION, STEROID, EPIDURAL, TRANSFORAMINAL APPROACH;  Surgeon: Fidencio Vazquez MD;  Location: [facility] OR;  Service: Pain Management;  Laterality: Bilateral;  L4-5, L5-S1    TRANSFORAMINAL EPIDURAL INJECTION OF STEROID Bilateral 2/28/2023    Procedure: Injection,steroid,epidural,transforaminal approach;  Surgeon: Fidencio Vazquez MD;  Location: [facility] OR;  Service: Pain Management;  Laterality: Bilateral;  L4-5, L5-s1 Gerald TFESI     No family history on file.  Social History     Socioeconomic History    Marital status:    Tobacco Use    Smoking status: Never    Smokeless tobacco: Never   Substance and Sexual Activity    Alcohol use: Yes     Alcohol/week: 1.0 standard drink     Types: 1 Cans of beer per week    Drug use: No    Sexual activity: Yes       Medications/Allergies: See med card    Vitals:    04/20/23 0848   Weight: 81.2 kg (179 lb)   Height: 5' 9" (1.753 m)   PainSc:   7   PainLoc: Neck       Physical exam:    GENERAL: A and O x3, the patient appears well groomed and is in " no acute distress.  Skin: No rashes or obvious lesions  HEENT: normocephalic, atraumatic  CARDIOVASCULAR:  RRR  LUNGS: non labored breathing  ABDOMEN: soft, nontender   UPPER EXTREMITIES: Normal alignment, normal range of motion, no atrophy, no skin changes,  hair growth and nail growth normal and equal bilaterally. No swelling, no tenderness.    LOWER EXTREMITIES:  Normal alignment, normal range of motion, no atrophy, no skin changes,  hair growth and nail growth normal and equal bilaterally. No swelling, no tenderness.  CERVICAL SPINE:  Cervical spine: ROM is limited in flexion, extension and lateral rotation with moderate increased pain.  Spurling's maneuver causes no neck pain to either side.  Myofascial exam: No Tenderness to palpation across cervical paraspinous region bilaterally.    LUMBAR SPINE  Lumbar spine: ROM is full with flexion extension and oblique extension with moderate increased pain.    Darrel's test causes no increased pain on either side.    Supine straight leg raise is negative   Internal and external rotation of the hip causes no increased pain on either side.  Myofascial exam: No tenderness to palpation across lumbar paraspinous muscles.      MENTAL STATUS: normal orientation, speech, language, and fund of knowledge for social situation.  Emotional state appropriate.    CRANIAL NERVES:  II:  PERRL bilaterally,   III,IV,VI: EOMI.    V:  Facial sensation equal bilaterally  VII:  Facial motor function normal.  VIII:  Hearing equal to finger rub bilaterally  IX/X: Gag normal, palate symmetric  XI:  Shoulder shrug equal, head turn equal  XII:  Tongue midline without fasciculations      MOTOR: Tone and bulk: normal bilateral upper and lower Strength: normal   Delt Bi Tri WE WF     R 5 5 5 5 5 5   L 5 5 5 5 5 5     IP ADD ABD Quad TA Gas HAM  R 5 5 5 5 5 5 5  L 5 5 5 5 5 5 5    SENSATION: Light touch and pinprick intact bilaterally  REFLEXES: normal, symmetric, nonbrisk.  Toes down, no clonus.  No marcellomans.  GAIT:  Uses cane for assistance     Imaging:  MRI lumbar spine 01/2016 (outside records)  Lumbar spondylosis with small posterior disc protrusion L4-5 and L5-S1 with moderate bilateral neuroforaminal narrowing at L4-5.  Has postoperative changes of a laminotomy at L4-5 noted.    MRI cervical spine 05/2015  Postoperative changes of total disc arthroplasty at C4-5 anterior fusion with interbody spaces at C5-6 and C6-7.    Assessment:  Marshall Barboza is a 53 y.o. male with neck and lower back pain  1. Lumbar radiculitis    2. Postlaminectomy syndrome of lumbar region    3. DDD (degenerative disc disease), lumbar          Plan:  1. I have stressed the importance of physical activity and exercise to improve overall health  2. Monitor progress from repeat lumbar epidural steroid injection bilaterally at the L4-5.   3. Consider repeat cervical DEMETRIA if neck pain worsens  4. He continue tramadol 50 mg q day as needed.  He takes this very sparingly. Prescription sent to pharmacy.  Previous UDS consistent. UDS next clinic visit.   5. Follow-up 3 months   All medication management was performed by Dr. Fidencio Vazquez

## 2023-06-28 ENCOUNTER — OFFICE VISIT (OUTPATIENT)
Dept: PAIN MEDICINE | Facility: CLINIC | Age: 53
End: 2023-06-28
Payer: OTHER GOVERNMENT

## 2023-06-28 ENCOUNTER — TELEPHONE (OUTPATIENT)
Dept: PAIN MEDICINE | Facility: CLINIC | Age: 53
End: 2023-06-28

## 2023-06-28 VITALS
HEART RATE: 61 BPM | DIASTOLIC BLOOD PRESSURE: 92 MMHG | BODY MASS INDEX: 26.51 KG/M2 | WEIGHT: 179 LBS | HEIGHT: 69 IN | SYSTOLIC BLOOD PRESSURE: 154 MMHG

## 2023-06-28 DIAGNOSIS — M51.36 DDD (DEGENERATIVE DISC DISEASE), LUMBAR: Primary | ICD-10-CM

## 2023-06-28 DIAGNOSIS — M96.1 POSTLAMINECTOMY SYNDROME OF LUMBAR REGION: ICD-10-CM

## 2023-06-28 DIAGNOSIS — M54.16 LUMBAR RADICULITIS: ICD-10-CM

## 2023-06-28 PROCEDURE — 99999 PR PBB SHADOW E&M-EST. PATIENT-LVL III: CPT | Mod: PBBFAC,,, | Performed by: PHYSICIAN ASSISTANT

## 2023-06-28 PROCEDURE — 80326 AMPHETAMINES 5 OR MORE: CPT | Performed by: PHYSICIAN ASSISTANT

## 2023-06-28 PROCEDURE — 99214 OFFICE O/P EST MOD 30 MIN: CPT | Mod: S$PBB,,, | Performed by: PHYSICIAN ASSISTANT

## 2023-06-28 PROCEDURE — 99999 PR PBB SHADOW E&M-EST. PATIENT-LVL III: ICD-10-PCS | Mod: PBBFAC,,, | Performed by: PHYSICIAN ASSISTANT

## 2023-06-28 PROCEDURE — 99214 PR OFFICE/OUTPT VISIT, EST, LEVL IV, 30-39 MIN: ICD-10-PCS | Mod: S$PBB,,, | Performed by: PHYSICIAN ASSISTANT

## 2023-06-28 PROCEDURE — 99213 OFFICE O/P EST LOW 20 MIN: CPT | Mod: PBBFAC,PN | Performed by: PHYSICIAN ASSISTANT

## 2023-06-28 RX ORDER — TRAMADOL HYDROCHLORIDE 50 MG/1
50 TABLET ORAL EVERY 8 HOURS PRN
Qty: 30 TABLET | Refills: 0 | Status: SHIPPED | OUTPATIENT
Start: 2023-06-28 | End: 2023-09-20 | Stop reason: SDUPTHER

## 2023-06-28 NOTE — PROGRESS NOTES
Referring Physician: No ref. provider found    PCP: Sara Quan NP      CC:  Neck and lower back pain    Interval History:  Marshall Barboza is a 50 y.o. male with chronic neck and low back pain who presents today for f/u and medication refill. Repeat  L4-5 TF DEMETRIA  provided 60% relief x > 2 months. Previous provided 90% relief. Reports low back pain that radiates down bilateral lateral thighs.  Terminates at knee. This has returned to prior intensity and frequency. Denies any weakness, b/b changes, or foot drop. Does not have any symptoms below the knee. He take Tramadol sparingly. Neck pain has improved in past s/p cervical DEMETRIA.  He has a history of C4-C7 ACDF.  Denies dropping objects or hand writing changes. Pain today is rated 7/10.     Prior HPI:   Marshall Barboza is a 53 y.o. male referred to us for neck and lower back pain.  Patient is a former active  personnel.  He has significant history of cervical and lumbar spine surgery.  He underwent L4-5 laminotomy and diskectomy in February 2015.  Radicular pain improved but he continues to have lower back pain.  He also was found to have cervical DDD.  He underwent a C4-C7 ACDF.  Lower back and right leg pain is more bothersome currently.  He has constant aching, throbbing pain in his lower back.  Pain radiates to down his right buttock into his right calf.  Pain occasionally travels down the left calf.  He has had lumbar MB RFA procedures in the past at the  with short-lived relief.  He has undergone lumbar ESIwith moderate benefit for 3 months.  He currently takes tramadol very sparingly with mild-to-moderate benefit.  He denies any worsening weakness.  No bowel bladder changes.    Interventional History:  2/28/23  b/l TF DEMETRIA at L4-5 >40% relief   7/12/22 b/l TF DEMETRIA at L4-5 60% relief.   12/28/21 Cervical DEMETRIA >40% relief  11/9/21 b/l TF DEMETRIA at L4-5 >40% relief   B/L TF DEMETRIA L4-5 on 1/15/21 >90% relief x 6 months  B/L TF DEMETRIA L4-5  on 11/9/21 >40% relief     ROS:  CONSTITUTIONAL: No fevers, chills, night sweats, wt. loss, appetite changes  SKIN: no rashes or itching  ENT: No headaches, head trauma, vision changes, or eye pain  LYMPH NODES: None noticed   CV: No chest pain, palpitations.   RESP: No shortness of breath, dyspnea on exertion, cough, wheezing, or hemoptysis  GI: No nausea, emesis, diarrhea, constipation, melena, hematochezia, pain.    : No dysuria, hematuria, urgency, or frequency   HEME: No easy bruising, bleeding problems  PSYCHIATRIC: No depression, anxiety, psychosis, hallucinations.  NEURO: No seizures, memory loss, dizziness or difficulty sleeping  MSK:  Positive per HPI    Past Medical History:   Diagnosis Date    Allergy     Arthritis     Depression     Hypertension     Lumbar radiculitis 8/2/2018    LIZET on CPAP      Past Surgical History:   Procedure Laterality Date    BREAST SURGERY      EPIDURAL STEROID INJECTION INTO CERVICAL SPINE N/A 12/28/2021    Procedure: Injection-steroid-epidural-cervical;  Surgeon: Fidencio Vazquez MD;  Location: [facility] OR;  Service: Pain Management;  Laterality: N/A;  C7-T1     FRACTURE SURGERY      HERNIA REPAIR      SPINE SURGERY      TRANSFORAMINAL EPIDURAL INJECTION OF STEROID Bilateral 3/12/2019    Procedure: Injection,steroid,epidural,transforaminal approach L4-5;  Surgeon: Fidencio Vazquez MD;  Location: [facility] OR;  Service: Pain Management;  Laterality: Bilateral;    TRANSFORAMINAL EPIDURAL INJECTION OF STEROID Bilateral 8/9/2019    Procedure: Injection,steroid,epidural,transforaminal approach;  Surgeon: Fidencio Vazquez MD;  Location: [facility] OR;  Service: Pain Management;  Laterality: Bilateral;  L4-5    TRANSFORAMINAL EPIDURAL INJECTION OF STEROID Bilateral 11/15/2019    Procedure: Injection,steroid,epidural,transforaminal approach;  Surgeon: Fidencio Vazquez MD;  Location: [facility] OR;  Service: Pain Management;  Laterality: Bilateral;  L4-L5    TRANSFORAMINAL EPIDURAL INJECTION OF STEROID Bilateral 2/18/2020  "   Procedure: Injection,steroid,epidural,transforaminal approach;  Surgeon: Fidencio Vazquez MD;  Location: [facility] OR;  Service: Pain Management;  Laterality: Bilateral;  L4-5    TRANSFORAMINAL EPIDURAL INJECTION OF STEROID Bilateral 7/29/2020    Procedure: Injection,steroid,epidural,transforaminal approach;  Surgeon: Fidencio Vazquez MD;  Location: [facility] OR;  Service: Pain Management;  Laterality: Bilateral;  L4-5    TRANSFORAMINAL EPIDURAL INJECTION OF STEROID Bilateral 1/15/2021    Procedure: Injection,steroid,epidural,transforaminal approach BILATERAL L4-5;  Surgeon: Fidencio Vazquez MD;  Location: [facility] OR;  Service: Pain Management;  Laterality: Bilateral;    TRANSFORAMINAL EPIDURAL INJECTION OF STEROID Bilateral 11/9/2021    Procedure: Injection,steroid,epidural,transforaminal approach;  Surgeon: Fidencio Vazquez MD;  Location: [facility] OR;  Service: Pain Management;  Laterality: Bilateral;  L4-5    TRANSFORAMINAL EPIDURAL INJECTION OF STEROID Bilateral 7/12/2022    Procedure: INJECTION, STEROID, EPIDURAL, TRANSFORAMINAL APPROACH;  Surgeon: Fidencio Vazquez MD;  Location: [facility] OR;  Service: Pain Management;  Laterality: Bilateral;  L4-5, L5-S1    TRANSFORAMINAL EPIDURAL INJECTION OF STEROID Bilateral 2/28/2023    Procedure: Injection,steroid,epidural,transforaminal approach;  Surgeon: Fidencio Vazquez MD;  Location: [facility] OR;  Service: Pain Management;  Laterality: Bilateral;  L4-5, L5-s1 Gerald TFESI     No family history on file.  Social History     Socioeconomic History    Marital status:    Tobacco Use    Smoking status: Never    Smokeless tobacco: Never   Substance and Sexual Activity    Alcohol use: Yes     Alcohol/week: 1.0 standard drink     Types: 1 Cans of beer per week    Drug use: No    Sexual activity: Yes       Medications/Allergies: See med card    Vitals:    06/28/23 1019   BP: (!) 154/92   Pulse: 61   Weight: 81.2 kg (179 lb)   Height: 5' 9" (1.753 m)   PainSc:   7       Physical exam:    GENERAL: A and O x3, the patient " appears well groomed and is in no acute distress.  Skin: No rashes or obvious lesions  HEENT: normocephalic, atraumatic  CARDIOVASCULAR:  RRR  LUNGS: non labored breathing  ABDOMEN: soft, nontender   UPPER EXTREMITIES: Normal alignment, normal range of motion, no atrophy, no skin changes,  hair growth and nail growth normal and equal bilaterally. No swelling, no tenderness.    LOWER EXTREMITIES:  Normal alignment, normal range of motion, no atrophy, no skin changes,  hair growth and nail growth normal and equal bilaterally. No swelling, no tenderness.  CERVICAL SPINE:  Cervical spine: ROM is limited in flexion, extension and lateral rotation with moderate increased pain.  Spurling's maneuver causes no neck pain to either side.  Myofascial exam: No Tenderness to palpation across cervical paraspinous region bilaterally.    LUMBAR SPINE  Lumbar spine: ROM is full with flexion extension and oblique extension with moderate increased pain.    Darrel's test causes no increased pain on either side.    Supine straight leg raise is negative   Internal and external rotation of the hip causes no increased pain on either side.  Myofascial exam: No tenderness to palpation across lumbar paraspinous muscles.      MENTAL STATUS: normal orientation, speech, language, and fund of knowledge for social situation.  Emotional state appropriate.    CRANIAL NERVES:  II:  PERRL bilaterally,   III,IV,VI: EOMI.    V:  Facial sensation equal bilaterally  VII:  Facial motor function normal.  VIII:  Hearing equal to finger rub bilaterally  IX/X: Gag normal, palate symmetric  XI:  Shoulder shrug equal, head turn equal  XII:  Tongue midline without fasciculations      MOTOR: Tone and bulk: normal bilateral upper and lower Strength: normal   Delt Bi Tri WE WF     R 5 5 5 5 5 5   L 5 5 5 5 5 5     IP ADD ABD Quad TA Gas HAM  R 5 5 5 5 5 5 5  L 5 5 5 5 5 5 5    SENSATION: Light touch and pinprick intact bilaterally  REFLEXES: normal, symmetric,  nonbrisk.  Toes down, no clonus. No hoffmans.  GAIT:  Uses cane for assistance     Imaging:  MRI lumbar spine 01/2016 (outside records)  Lumbar spondylosis with small posterior disc protrusion L4-5 and L5-S1 with moderate bilateral neuroforaminal narrowing at L4-5.  Has postoperative changes of a laminotomy at L4-5 noted.    MRI cervical spine 05/2015  Postoperative changes of total disc arthroplasty at C4-5 anterior fusion with interbody spaces at C5-6 and C6-7.    Assessment:  Marshall Barboza is a 53 y.o. male with neck and lower back pain  1. Lumbar radiculitis    2. Postlaminectomy syndrome of lumbar region    3. DDD (degenerative disc disease), lumbar      Plan:  1. I have stressed the importance of physical activity and exercise to improve overall health  2. Schedule TF lumbar epidural steroid injection bilaterally at the L4-5. I have explained the risks, benefits, and alternatives of the procedure in detail. The patient voices understanding and all questions have been answered. The patient agrees to proceed as planned. Written Consent obtained.   3. Consider repeat cervical DEMETRIA if neck pain worsens  4. He continue tramadol 50 mg q day as needed.  He takes this very sparingly. Prescription sent to pharmacy.  Previous UDS consistent. UDS today. Last dose 1 week ago  5. Follow-up 3 months   All medication management was performed by Dr. Fidencio Vazquez

## 2023-06-28 NOTE — TELEPHONE ENCOUNTER
Types of orders made on 06/28/2023: Procedure Request      Order Date:6/28/2023   Ordering User:KAYLEIGH PAVON [[id number]   Encounter Provider:Kayleigh johnson PA-C [[id number]]   Authorizing Provider: Kayleigh Pavon PA-C [[id number]]   Supervising Provider:KIMBERLY FROST [[id number]]   Type of Supervision:Supervision Required   Department:[facility] PAIN MANAGEMENT[[id number]]      Common Order Information   Procedure -> Transforaminal Injection (Specify level and laterality) Cmt:              bilaterally L4-5      Pre-op Diagnosis -> Lumbar radiculitis       Order Specific Information   Order:    BProcedure Order to Pain Management [Custom: [id number]]  Order #:           [id number]: 1 FUTURE     Priority: Routine  Class: Clinic Performed     Future Order Information       Expires on:06/28/2024            Expected by:06/28/2023                    Associated Diagnoses

## 2023-07-03 LAB
6MAM UR QL: NOT DETECTED
7AMINOCLONAZEPAM UR QL: NOT DETECTED
A-OH ALPRAZ UR QL: NOT DETECTED
ALPHA-OH-MIDAZOLAM: NOT DETECTED
ALPRAZ UR QL: NOT DETECTED
AMPHET UR QL SCN: NOT DETECTED
ANNOTATION COMMENT IMP: NORMAL
ANNOTATION COMMENT IMP: NORMAL
BARBITURATES UR QL: NOT DETECTED
BUPRENORPHINE UR QL: NOT DETECTED
BZE UR QL: NOT DETECTED
CARBOXYTHC UR QL: NOT DETECTED
CARISOPRODOL UR QL: NOT DETECTED
CLONAZEPAM UR QL: NOT DETECTED
CODEINE UR QL: NOT DETECTED
CREAT UR-MCNC: 179.5 MG/DL (ref 20–400)
DIAZEPAM UR QL: NOT DETECTED
ETHYL GLUCURONIDE UR QL: NOT DETECTED
FENTANYL UR QL: NOT DETECTED
GABAPENTIN: NOT DETECTED
HYDROCODONE UR QL: NOT DETECTED
HYDROMORPHONE UR QL: NOT DETECTED
LORAZEPAM UR QL: NOT DETECTED
MDA UR QL: NOT DETECTED
MDEA UR QL: NOT DETECTED
MDMA UR QL: NOT DETECTED
ME-PHENIDATE UR QL: NOT DETECTED
METHADONE UR QL: NOT DETECTED
METHAMPHET UR QL: NOT DETECTED
MIDAZOLAM UR QL SCN: NOT DETECTED
MORPHINE UR QL: NOT DETECTED
NALOXONE: NOT DETECTED
NORBUPRENORPHINE UR QL CFM: NOT DETECTED
NORDIAZEPAM UR QL: NOT DETECTED
NORFENTANYL UR QL: NOT DETECTED
NORHYDROCODONE UR QL CFM: NOT DETECTED
NORMEPERIDINE UR QL CFM: NOT DETECTED
NOROXYCODONE UR QL CFM: NOT DETECTED
NOROXYMORPHONE UR QL SCN: NOT DETECTED
OXAZEPAM UR QL: NOT DETECTED
OXYCODONE UR QL: NOT DETECTED
OXYMORPHONE UR QL: NOT DETECTED
PATHOLOGY STUDY: NORMAL
PCP UR QL: NOT DETECTED
PHENTERMINE UR QL: NOT DETECTED
PREGABALIN: NOT DETECTED
SERVICE CMNT-IMP: NORMAL
TAPENTADOL UR QL SCN: NOT DETECTED
TAPENTADOL UR QL SCN: NOT DETECTED
TEMAZEPAM UR QL: NOT DETECTED
TRAMADOL UR QL: NOT DETECTED
ZOLPIDEM METABOLITE: NOT DETECTED
ZOLPIDEM UR QL: NOT DETECTED

## 2023-08-02 ENCOUNTER — TELEPHONE (OUTPATIENT)
Dept: PAIN MEDICINE | Facility: CLINIC | Age: 53
End: 2023-08-02
Payer: OTHER GOVERNMENT

## 2023-08-02 DIAGNOSIS — M54.16 LUMBAR RADICULITIS: Primary | ICD-10-CM

## 2023-08-02 DIAGNOSIS — M51.36 DDD (DEGENERATIVE DISC DISEASE), LUMBAR: ICD-10-CM

## 2023-08-02 NOTE — TELEPHONE ENCOUNTER
----- Message from Glenny Brooks LPN sent at 8/2/2023 12:41 PM CDT -----  Contact: self  Can we get him scheduled for procedure. Looks like Maribel attempted to call multiple times  ----- Message -----  From: Peace Bundy, Patient Care Assistant  Sent: 8/2/2023  11:50 AM CDT  To: George Nicolas Staff    Pt is calling to hiro his injection [phone]  thanks

## 2023-09-01 ENCOUNTER — HOSPITAL ENCOUNTER (OUTPATIENT)
Facility: HOSPITAL | Age: 53
Discharge: HOME | End: 2023-09-01
Attending: ANESTHESIOLOGY | Admitting: ANESTHESIOLOGY
Payer: OTHER GOVERNMENT

## 2023-09-01 DIAGNOSIS — M54.16 LUMBAR RADICULITIS: ICD-10-CM

## 2023-09-01 PROCEDURE — 63600175 PHARM REV CODE 636 W HCPCS: Performed by: ANESTHESIOLOGY

## 2023-09-01 PROCEDURE — 64483 NJX AA&/STRD TFRM EPI L/S 1: CPT | Mod: 50,,, | Performed by: ANESTHESIOLOGY

## 2023-09-01 PROCEDURE — 64483 NJX AA&/STRD TFRM EPI L/S 1: CPT | Mod: 50 | Performed by: ANESTHESIOLOGY

## 2023-09-01 PROCEDURE — 64483 PR EPIDURAL INJ, ANES/STEROID, TRANSFORAMINAL, LUMB/SACR, SNGL LEVL: ICD-10-PCS | Mod: 50,,, | Performed by: ANESTHESIOLOGY

## 2023-09-01 PROCEDURE — 25500020 PHARM REV CODE 255: Performed by: ANESTHESIOLOGY

## 2023-09-01 PROCEDURE — 25000003 PHARM REV CODE 250: Performed by: ANESTHESIOLOGY

## 2023-09-01 RX ORDER — DEXAMETHASONE SODIUM PHOSPHATE 10 MG/ML
INJECTION INTRAMUSCULAR; INTRAVENOUS
Status: DISCONTINUED | OUTPATIENT
Start: 2023-09-01 | End: 2023-09-01 | Stop reason: HOSPADM

## 2023-09-01 RX ORDER — BUPIVACAINE HYDROCHLORIDE 2.5 MG/ML
INJECTION, SOLUTION EPIDURAL; INFILTRATION; INTRACAUDAL
Status: DISCONTINUED | OUTPATIENT
Start: 2023-09-01 | End: 2023-09-01 | Stop reason: HOSPADM

## 2023-09-01 RX ORDER — LIDOCAINE HYDROCHLORIDE 10 MG/ML
1 INJECTION, SOLUTION EPIDURAL; INFILTRATION; INTRACAUDAL; PERINEURAL ONCE
Status: ACTIVE | OUTPATIENT
Start: 2023-09-01

## 2023-09-01 RX ORDER — LIDOCAINE HYDROCHLORIDE 10 MG/ML
INJECTION, SOLUTION EPIDURAL; INFILTRATION; INTRACAUDAL; PERINEURAL
Status: DISCONTINUED | OUTPATIENT
Start: 2023-09-01 | End: 2023-09-01 | Stop reason: HOSPADM

## 2023-09-01 RX ORDER — SODIUM CHLORIDE, SODIUM LACTATE, POTASSIUM CHLORIDE, CALCIUM CHLORIDE 600; 310; 30; 20 MG/100ML; MG/100ML; MG/100ML; MG/100ML
INJECTION, SOLUTION INTRAVENOUS CONTINUOUS
Status: ACTIVE | OUTPATIENT
Start: 2023-09-01

## 2023-09-01 NOTE — OP NOTE
PROCEDURE DATE: 9/1/2023    PROCEDURE: Bilateral L4-5 transforaminal epidural steroid injection under fluoroscopy    DIAGNOSIS: Lumbar radiculitis    Post op diagnosis: Same    PHYSICIAN: Fidencio Vazquez MD    MEDICATIONS INJECTED:  Dexamethasone 5mg (0.5ml) and 1.5ml 0.25% bupivicaine at each nerve root.     LOCAL ANESTHETIC INJECTED:  Lidocaine 1%. 2 ml per site.    SEDATION MEDICATIONS: None    ESTIMATED BLOOD LOSS:  None    COMPLICATIONS:  None    TECHNIQUE:   A time-out was taken to identify patient and procedure side prior to starting the procedure. The patient was placed in a prone position, prepped and draped in the usual sterile fashion using ChloraPrep and sterile towels.  The area to be injected was determined under fluoroscopic guidance in AP and oblique view.  Local anesthetic was given by raising a wheal and going down to the hub of a 25-gauge 1.5 inch needle.  In oblique view, a 3.5 inch 22-gauge bent-tip spinal needle was introduced towards 6 oclock position of the pedicle of each above named nerve root level.  The needle was walked medially then hinged into the neural foramen and position was confirmed in AP and lateral views.  1ml contrast dye was injected to confirm appropriate placement and that there was no vascular uptake.  After negative aspiration for blood or CSF, the medication was then injected. This was performed at the bilateral L4-5 level(s). The patient tolerated the procedure well.    The patient was monitored after the procedure.  Patient was given post procedure and discharge instructions to follow at home. The patient was discharged in a stable condition.

## 2023-09-01 NOTE — DISCHARGE SUMMARY
[facility] - Periop Services  Discharge Note  Short Stay    Procedure(s) (LRB):  Injection,steroid,epidural,transforaminal approach (Bilateral)      OUTCOME: Patient tolerated treatment/procedure well without complication and is now ready for discharge.    DISPOSITION: Home or Self Care    FINAL DIAGNOSIS:  <principal problem not specified>    FOLLOWUP: In clinic    DISCHARGE INSTRUCTIONS:    Discharge Procedure Orders   Notify your health care provider if you experience any of the following:  temperature >100.4     Notify your health care provider if you experience any of the following:  severe uncontrolled pain     Notify your health care provider if you experience any of the following:  redness, tenderness, or signs of infection (pain, swelling, redness, odor or green/yellow discharge around incision site)     Activity as tolerated        TIME SPENT ON DISCHARGE: 30 minutes

## 2023-09-01 NOTE — H&P
CC: low back pain    HPI: The patient is a 53 y.o. male with a history of low back pain here for DEMETRIA. There are no major changes in history and physical from 6/25/23 by [place].    Past Medical History:   Diagnosis Date    Allergy     Arthritis     Depression     Hypertension     Lumbar radiculitis 8/2/2018    LIZET on CPAP        Past Surgical History:   Procedure Laterality Date    BREAST SURGERY      EPIDURAL STEROID INJECTION INTO CERVICAL SPINE N/A 12/28/2021    Procedure: Injection-steroid-epidural-cervical;  Surgeon: Fidencio Vazquez MD;  Location: [facility] OR;  Service: Pain Management;  Laterality: N/A;  C7-T1     FRACTURE SURGERY      HERNIA REPAIR      SPINE SURGERY      TRANSFORAMINAL EPIDURAL INJECTION OF STEROID Bilateral 3/12/2019    Procedure: Injection,steroid,epidural,transforaminal approach L4-5;  Surgeon: Fidencio Vazquez MD;  Location: [facility] OR;  Service: Pain Management;  Laterality: Bilateral;    TRANSFORAMINAL EPIDURAL INJECTION OF STEROID Bilateral 8/9/2019    Procedure: Injection,steroid,epidural,transforaminal approach;  Surgeon: Fidencio Vazquez MD;  Location: [facility] OR;  Service: Pain Management;  Laterality: Bilateral;  L4-5    TRANSFORAMINAL EPIDURAL INJECTION OF STEROID Bilateral 11/15/2019    Procedure: Injection,steroid,epidural,transforaminal approach;  Surgeon: Fidencio Vazquez MD;  Location: [facility] OR;  Service: Pain Management;  Laterality: Bilateral;  L4-L5    TRANSFORAMINAL EPIDURAL INJECTION OF STEROID Bilateral 2/18/2020    Procedure: Injection,steroid,epidural,transforaminal approach;  Surgeon: Fidencio Vazquez MD;  Location: [facility] OR;  Service: Pain Management;  Laterality: Bilateral;  L4-5    TRANSFORAMINAL EPIDURAL INJECTION OF STEROID Bilateral 7/29/2020    Procedure: Injection,steroid,epidural,transforaminal approach;  Surgeon: Fidencio Vazquez MD;  Location: [facility] OR;  Service: Pain Management;  Laterality: Bilateral;  L4-5    TRANSFORAMINAL EPIDURAL INJECTION OF STEROID Bilateral 1/15/2021    Procedure:  "Injection,steroid,epidural,transforaminal approach BILATERAL L4-5;  Surgeon: Fidencio Vazquez MD;  Location: [facility] OR;  Service: Pain Management;  Laterality: Bilateral;    TRANSFORAMINAL EPIDURAL INJECTION OF STEROID Bilateral 11/9/2021    Procedure: Injection,steroid,epidural,transforaminal approach;  Surgeon: Fidencio Vazquez MD;  Location: [facility] OR;  Service: Pain Management;  Laterality: Bilateral;  L4-5    TRANSFORAMINAL EPIDURAL INJECTION OF STEROID Bilateral 7/12/2022    Procedure: INJECTION, STEROID, EPIDURAL, TRANSFORAMINAL APPROACH;  Surgeon: Fidencio Vazquez MD;  Location: [facility] OR;  Service: Pain Management;  Laterality: Bilateral;  L4-5, L5-S1    TRANSFORAMINAL EPIDURAL INJECTION OF STEROID Bilateral 2/28/2023    Procedure: Injection,steroid,epidural,transforaminal approach;  Surgeon: Fidencio Vazquez MD;  Location: [facility] OR;  Service: Pain Management;  Laterality: Bilateral;  L4-5, L5-s1 Gerald TFESI       History reviewed. No pertinent family history.    Social History     Socioeconomic History    Marital status:    Tobacco Use    Smoking status: Never    Smokeless tobacco: Never   Substance and Sexual Activity    Alcohol use: Yes     Alcohol/week: 1.0 standard drink of alcohol     Types: 1 Cans of beer per week    Drug use: No    Sexual activity: Yes       No current facility-administered medications for this encounter.     Facility-Administered Medications Ordered in Other Encounters   Medication Dose Route Frequency Provider Last Rate Last Admin    lactated ringers infusion   Intravenous Once PRN Fidencio Vazquez MD        lactated ringers infusion   Intravenous Once PRN Fidencio Vazquez MD        lactated ringers infusion   Intravenous Once PRN Fidencio Vazquez MD           Review of patient's allergies indicates:   Allergen Reactions    Pregabalin Other (See Comments)       Vitals:    08/28/23 0947   Weight: 81.2 kg (179 lb 0.2 oz)   Height: 5' 9.02" (1.753 m)       REVIEW OF SYSTEMS:     GENERAL: No weight loss, malaise or " fevers.  HEENT:  No recent changes in vision or hearing  NECK: Negative for lumps, no difficulty with swallowing.  RESPIRATORY: Negative for cough, wheezing or shortness of breath, patient denies any recent URI.  CARDIOVASCULAR: Negative for chest pain, leg swelling or palpitations.  GI: Negative for abdominal discomfort, blood in stools or black stools or change in bowel habits.  MUSCULOSKELETAL: See HPI.  SKIN: Negative for lesions, rash, and itching.  PSYCH: No suicidal or homicidal ideations, no current mood disturbances.  HEMATOLOGY/LYMPHOLOGY: Negative for prolonged bleeding, bruising easily or swollen nodes. Patient is not currently taking any anti-coagulants  ENDO: No history of diabetes or thyroid dysfunction  NEURO: No history of syncope, paralysis, seizures or tremors.All other reviewed and negative other than HPI.    Physical exam:  Gen: A and O x3, pleasant, well-groomed  Skin: No rashes or obvious lesions  HEENT: PERRLA, no obvious deformities on ears or in canals. No thyroid masses, trachea midline, no palpable lymph nodes in neck, axilla.  CVS: Regular rate and rhythm, normal S1 and S2, no murmurs.  Resp: Clear to auscultation bilaterally.  Abdomen: Soft, NT/ND, normal bowel sounds present.  Musculoskeletal/Neuro: Moving all extremities    Assessment:  Lumbar radiculitis  -     Case Request Operating Room: Injection,steroid,epidural,transforaminal approach          PLAN: DEMETRIA      This patient has been cleared for surgery in an ambulatory surgical facility    ASA 3,  Mallampatti Score 3  No history of anesthetic complications  Plan for RN IV sedation

## 2023-09-05 VITALS
BODY MASS INDEX: 26.51 KG/M2 | HEART RATE: 69 BPM | DIASTOLIC BLOOD PRESSURE: 82 MMHG | WEIGHT: 179 LBS | OXYGEN SATURATION: 97 % | SYSTOLIC BLOOD PRESSURE: 178 MMHG | TEMPERATURE: 99 F | HEIGHT: 69 IN | RESPIRATION RATE: 18 BRPM

## 2023-09-20 ENCOUNTER — OFFICE VISIT (OUTPATIENT)
Dept: PAIN MEDICINE | Facility: CLINIC | Age: 53
End: 2023-09-20
Payer: OTHER GOVERNMENT

## 2023-09-20 VITALS
SYSTOLIC BLOOD PRESSURE: 140 MMHG | DIASTOLIC BLOOD PRESSURE: 92 MMHG | HEIGHT: 69 IN | HEART RATE: 69 BPM | WEIGHT: 179 LBS | BODY MASS INDEX: 26.51 KG/M2

## 2023-09-20 DIAGNOSIS — M51.36 DDD (DEGENERATIVE DISC DISEASE), LUMBAR: ICD-10-CM

## 2023-09-20 DIAGNOSIS — M96.1 POSTLAMINECTOMY SYNDROME OF LUMBAR REGION: ICD-10-CM

## 2023-09-20 DIAGNOSIS — M50.30 DDD (DEGENERATIVE DISC DISEASE), CERVICAL: ICD-10-CM

## 2023-09-20 DIAGNOSIS — M54.16 LUMBAR RADICULITIS: Primary | ICD-10-CM

## 2023-09-20 PROCEDURE — 99214 OFFICE O/P EST MOD 30 MIN: CPT | Mod: S$PBB,,, | Performed by: PHYSICIAN ASSISTANT

## 2023-09-20 PROCEDURE — 99214 OFFICE O/P EST MOD 30 MIN: CPT | Mod: PBBFAC,PN | Performed by: PHYSICIAN ASSISTANT

## 2023-09-20 PROCEDURE — 99214 PR OFFICE/OUTPT VISIT, EST, LEVL IV, 30-39 MIN: ICD-10-PCS | Mod: S$PBB,,, | Performed by: PHYSICIAN ASSISTANT

## 2023-09-20 PROCEDURE — 99999 PR PBB SHADOW E&M-EST. PATIENT-LVL IV: ICD-10-PCS | Mod: PBBFAC,,, | Performed by: PHYSICIAN ASSISTANT

## 2023-09-20 PROCEDURE — 99999 PR PBB SHADOW E&M-EST. PATIENT-LVL IV: CPT | Mod: PBBFAC,,, | Performed by: PHYSICIAN ASSISTANT

## 2023-09-20 RX ORDER — TRAMADOL HYDROCHLORIDE 50 MG/1
50 TABLET ORAL EVERY 8 HOURS PRN
Qty: 30 TABLET | Refills: 0 | Status: SHIPPED | OUTPATIENT
Start: 2023-09-20 | End: 2023-12-27 | Stop reason: SDUPTHER

## 2023-09-20 NOTE — PROGRESS NOTES
Referring Physician: No ref. provider found    PCP: Sara Quan, NP      CC:  Neck and lower back pain    Interval History:  Marshall Barboza is a 50 y.o. male with chronic neck and low back pain who presents today for f/u and medication refill. He is 3 weeks  s/p L4-5 TF DEMETRIA that provided 60% relief.  Previous provided 90% relief. Reports low back pain that radiates down bilateral lateral thighs.  Terminates at knee. This has returned to prior intensity and frequency. Denies any weakness, b/b changes, or foot drop. Does not have any symptoms below the knee. He take Tramadol sparingly. Neck pain has improved in past s/p cervical DEMETRIA.  He has a history of C4-C7 ACDF.  Denies dropping objects or hand writing changes. Pain today is rated 6/10.     Prior HPI:   Marshall Barboza is a 53 y.o. male referred to us for neck and lower back pain.  Patient is a former active  personnel.  He has significant history of cervical and lumbar spine surgery.  He underwent L4-5 laminotomy and diskectomy in February 2015.  Radicular pain improved but he continues to have lower back pain.  He also was found to have cervical DDD.  He underwent a C4-C7 ACDF.  Lower back and right leg pain is more bothersome currently.  He has constant aching, throbbing pain in his lower back.  Pain radiates to down his right buttock into his right calf.  Pain occasionally travels down the left calf.  He has had lumbar MB RFA procedures in the past at the  with short-lived relief.  He has undergone lumbar ESIwith moderate benefit for 3 months.  He currently takes tramadol very sparingly with mild-to-moderate benefit.  He denies any worsening weakness.  No bowel bladder changes.    Interventional History:  2/28/23  b/l TF DEMETRIA at L4-5 >40% relief   7/12/22 b/l TF DEMETIRA at L4-5 60% relief.   12/28/21 Cervical DEMETRIA >40% relief  11/9/21 b/l TF DEMETRIA at L4-5 >40% relief   B/L TF DEMETRIA L4-5 on 1/15/21 >90% relief x 6 months  B/L TF DEMETRIA  L4-5 on 11/9/21 >40% relief     ROS:  CONSTITUTIONAL: No fevers, chills, night sweats, wt. loss, appetite changes  SKIN: no rashes or itching  ENT: No headaches, head trauma, vision changes, or eye pain  LYMPH NODES: None noticed   CV: No chest pain, palpitations.   RESP: No shortness of breath, dyspnea on exertion, cough, wheezing, or hemoptysis  GI: No nausea, emesis, diarrhea, constipation, melena, hematochezia, pain.    : No dysuria, hematuria, urgency, or frequency   HEME: No easy bruising, bleeding problems  PSYCHIATRIC: No depression, anxiety, psychosis, hallucinations.  NEURO: No seizures, memory loss, dizziness or difficulty sleeping  MSK:  Positive per HPI    Past Medical History:   Diagnosis Date    Allergy     Arthritis     Depression     Hypertension     Lumbar radiculitis 8/2/2018    LIZET on CPAP      Past Surgical History:   Procedure Laterality Date    BREAST SURGERY      EPIDURAL STEROID INJECTION INTO CERVICAL SPINE N/A 12/28/2021    Procedure: Injection-steroid-epidural-cervical;  Surgeon: Fidencio Vazquez MD;  Location: [facility] OR;  Service: Pain Management;  Laterality: N/A;  C7-T1     FRACTURE SURGERY      HERNIA REPAIR      SPINE SURGERY      TRANSFORAMINAL EPIDURAL INJECTION OF STEROID Bilateral 3/12/2019    Procedure: Injection,steroid,epidural,transforaminal approach L4-5;  Surgeon: Fidencio Vazquez MD;  Location: [facility] OR;  Service: Pain Management;  Laterality: Bilateral;    TRANSFORAMINAL EPIDURAL INJECTION OF STEROID Bilateral 8/9/2019    Procedure: Injection,steroid,epidural,transforaminal approach;  Surgeon: Fidencio Vazquez MD;  Location: [facility] OR;  Service: Pain Management;  Laterality: Bilateral;  L4-5    TRANSFORAMINAL EPIDURAL INJECTION OF STEROID Bilateral 11/15/2019    Procedure: Injection,steroid,epidural,transforaminal approach;  Surgeon: Fidencio Vazquez MD;  Location: [facility] OR;  Service: Pain Management;  Laterality: Bilateral;  L4-L5    TRANSFORAMINAL EPIDURAL INJECTION OF STEROID Bilateral  2/18/2020    Procedure: Injection,steroid,epidural,transforaminal approach;  Surgeon: Fidencio Vazquez MD;  Location: [facility] OR;  Service: Pain Management;  Laterality: Bilateral;  L4-5    TRANSFORAMINAL EPIDURAL INJECTION OF STEROID Bilateral 7/29/2020    Procedure: Injection,steroid,epidural,transforaminal approach;  Surgeon: Fidencio Vazquez MD;  Location: [facility] OR;  Service: Pain Management;  Laterality: Bilateral;  L4-5    TRANSFORAMINAL EPIDURAL INJECTION OF STEROID Bilateral 1/15/2021    Procedure: Injection,steroid,epidural,transforaminal approach BILATERAL L4-5;  Surgeon: Fidencio Vazquez MD;  Location: [facility] OR;  Service: Pain Management;  Laterality: Bilateral;    TRANSFORAMINAL EPIDURAL INJECTION OF STEROID Bilateral 11/9/2021    Procedure: Injection,steroid,epidural,transforaminal approach;  Surgeon: Fidencio Vazquez MD;  Location: [facility] OR;  Service: Pain Management;  Laterality: Bilateral;  L4-5    TRANSFORAMINAL EPIDURAL INJECTION OF STEROID Bilateral 7/12/2022    Procedure: INJECTION, STEROID, EPIDURAL, TRANSFORAMINAL APPROACH;  Surgeon: Fidencio Vazquez MD;  Location: [facility] OR;  Service: Pain Management;  Laterality: Bilateral;  L4-5, L5-S1    TRANSFORAMINAL EPIDURAL INJECTION OF STEROID Bilateral 2/28/2023    Procedure: Injection,steroid,epidural,transforaminal approach;  Surgeon: Fidencio Vazquez MD;  Location: [facility] OR;  Service: Pain Management;  Laterality: Bilateral;  L4-5, L5-s1 Gerald TFESI    TRANSFORAMINAL EPIDURAL INJECTION OF STEROID Bilateral 9/1/2023    Procedure: Injection,steroid,epidural,transforaminal approach;  Surgeon: Fidencio Vazquez MD;  Location: [facility] OR;  Service: Anesthesiology;  Laterality: Bilateral;  L4-5     No family history on file.  Social History     Socioeconomic History    Marital status:    Tobacco Use    Smoking status: Never    Smokeless tobacco: Never   Substance and Sexual Activity    Alcohol use: Yes     Alcohol/week: 1.0 standard drink of alcohol     Types: 1 Cans of beer per week    Drug  "use: No    Sexual activity: Yes       Medications/Allergies: See med card    Vitals:    09/20/23 1428   BP: (!) 140/92   Pulse: 69   Weight: 81.2 kg (179 lb)   Height: 5' 9" (1.753 m)   PainSc:   6   PainLoc: Neck       Physical exam:    GENERAL: A and O x3, the patient appears well groomed and is in no acute distress.  Skin: No rashes or obvious lesions  HEENT: normocephalic, atraumatic  CARDIOVASCULAR:  RRR  LUNGS: non labored breathing  ABDOMEN: soft, nontender   UPPER EXTREMITIES: Normal alignment, normal range of motion, no atrophy, no skin changes,  hair growth and nail growth normal and equal bilaterally. No swelling, no tenderness.    LOWER EXTREMITIES:  Normal alignment, normal range of motion, no atrophy, no skin changes,  hair growth and nail growth normal and equal bilaterally. No swelling, no tenderness.  CERVICAL SPINE:  Cervical spine: ROM is limited in flexion, extension and lateral rotation with moderate increased pain.  Spurling's maneuver causes no neck pain to either side.  Myofascial exam: No Tenderness to palpation across cervical paraspinous region bilaterally.    LUMBAR SPINE  Lumbar spine: ROM is full with flexion extension and oblique extension with moderate increased pain.    Darrel's test causes no increased pain on either side.    Supine straight leg raise is negative   Internal and external rotation of the hip causes no increased pain on either side.  Myofascial exam: No tenderness to palpation across lumbar paraspinous muscles.      MENTAL STATUS: normal orientation, speech, language, and fund of knowledge for social situation.  Emotional state appropriate.    CRANIAL NERVES:  II:  PERRL bilaterally,   III,IV,VI: EOMI.    V:  Facial sensation equal bilaterally  VII:  Facial motor function normal.  VIII:  Hearing equal to finger rub bilaterally  IX/X: Gag normal, palate symmetric  XI:  Shoulder shrug equal, head turn equal  XII:  Tongue midline without fasciculations      MOTOR: Tone " and bulk: normal bilateral upper and lower Strength: normal   Delt Bi Tri WE WF     R 5 5 5 5 5 5   L 5 5 5 5 5 5     IP ADD ABD Quad TA Gas HAM  R 5 5 5 5 5 5 5  L 5 5 5 5 5 5 5    SENSATION: Light touch and pinprick intact bilaterally  REFLEXES: normal, symmetric, nonbrisk.  Toes down, no clonus. No hoffmans.  GAIT:  Uses cane for assistance     Imaging:  MRI lumbar spine 01/2016 (outside records)  Lumbar spondylosis with small posterior disc protrusion L4-5 and L5-S1 with moderate bilateral neuroforaminal narrowing at L4-5.  Has postoperative changes of a laminotomy at L4-5 noted.    MRI cervical spine 05/2015  Postoperative changes of total disc arthroplasty at C4-5 anterior fusion with interbody spaces at C5-6 and C6-7.    Assessment:  Marshall Barboza is a 53 y.o. male with neck and lower back pain  1. Lumbar radiculitis    2. DDD (degenerative disc disease), lumbar    3. Postlaminectomy syndrome of lumbar region    4. DDD (degenerative disc disease), cervical        Plan:  1. I have stressed the importance of physical activity and exercise to improve overall health  2. Monitor progress from TF lumbar epidural steroid injection bilaterally at the L4-5.   3. Consider repeat cervical DEMETRIA if neck pain worsens  4. He continue tramadol 50 mg q day as needed.  He takes this very sparingly. Prescription sent to pharmacy.  Previous UDS consistent. UDS today.   5. Follow-up 3 months   All medication management was performed by Dr. Fidencio Vazquez

## 2023-12-27 ENCOUNTER — OFFICE VISIT (OUTPATIENT)
Dept: PAIN MEDICINE | Facility: CLINIC | Age: 53
End: 2023-12-27
Payer: OTHER GOVERNMENT

## 2023-12-27 VITALS
HEART RATE: 70 BPM | BODY MASS INDEX: 26.51 KG/M2 | WEIGHT: 179 LBS | DIASTOLIC BLOOD PRESSURE: 87 MMHG | HEIGHT: 69 IN | SYSTOLIC BLOOD PRESSURE: 141 MMHG

## 2023-12-27 DIAGNOSIS — M96.1 POSTLAMINECTOMY SYNDROME OF LUMBAR REGION: ICD-10-CM

## 2023-12-27 DIAGNOSIS — M54.16 LUMBAR RADICULITIS: Primary | ICD-10-CM

## 2023-12-27 DIAGNOSIS — M51.36 DDD (DEGENERATIVE DISC DISEASE), LUMBAR: ICD-10-CM

## 2023-12-27 DIAGNOSIS — M50.30 DDD (DEGENERATIVE DISC DISEASE), CERVICAL: ICD-10-CM

## 2023-12-27 PROCEDURE — 99214 OFFICE O/P EST MOD 30 MIN: CPT | Mod: S$PBB,,, | Performed by: PHYSICIAN ASSISTANT

## 2023-12-27 PROCEDURE — 99213 OFFICE O/P EST LOW 20 MIN: CPT | Mod: PBBFAC,PN | Performed by: PHYSICIAN ASSISTANT

## 2023-12-27 PROCEDURE — 99214 PR OFFICE/OUTPT VISIT, EST, LEVL IV, 30-39 MIN: ICD-10-PCS | Mod: S$PBB,,, | Performed by: PHYSICIAN ASSISTANT

## 2023-12-27 PROCEDURE — 99999 PR PBB SHADOW E&M-EST. PATIENT-LVL III: CPT | Mod: PBBFAC,,, | Performed by: PHYSICIAN ASSISTANT

## 2023-12-27 PROCEDURE — 99999 PR PBB SHADOW E&M-EST. PATIENT-LVL III: ICD-10-PCS | Mod: PBBFAC,,, | Performed by: PHYSICIAN ASSISTANT

## 2023-12-27 RX ORDER — TRAMADOL HYDROCHLORIDE 50 MG/1
50 TABLET ORAL EVERY 8 HOURS PRN
Qty: 30 TABLET | Refills: 0 | Status: SHIPPED | OUTPATIENT
Start: 2023-12-27 | End: 2024-01-25

## 2023-12-27 NOTE — PROGRESS NOTES
Referring Physician: No ref. provider found    PCP: Sara Quan, NP      CC:  Neck and lower back pain    Interval History:  Marshall Barboza is a 50 y.o. male with chronic neck and low back pain who presents today for f/u and medication refill. He is s/p L4-5 TF DEMETRIA that provided 60% relief.  Previous provided 90% relief. Reports low back pain that radiates down bilateral lateral thighs.  Terminates at knee. This has returned to prior intensity and frequency. Denies any weakness, b/b changes, or foot drop. Does not have any symptoms below the knee. He take Tramadol sparingly. Neck pain has improved in past s/p cervical DEMETRIA.  He has a history of C4-C7 ACDF.  Denies dropping objects or hand writing changes. Pain today is rated 5/10.     Prior HPI:   Marshall Barboza is a 53 y.o. male referred to us for neck and lower back pain.  Patient is a former active  personnel.  He has significant history of cervical and lumbar spine surgery.  He underwent L4-5 laminotomy and diskectomy in February 2015.  Radicular pain improved but he continues to have lower back pain.  He also was found to have cervical DDD.  He underwent a C4-C7 ACDF.  Lower back and right leg pain is more bothersome currently.  He has constant aching, throbbing pain in his lower back.  Pain radiates to down his right buttock into his right calf.  Pain occasionally travels down the left calf.  He has had lumbar MB RFA procedures in the past at the  with short-lived relief.  He has undergone lumbar ESIwith moderate benefit for 3 months.  He currently takes tramadol very sparingly with mild-to-moderate benefit.  He denies any worsening weakness.  No bowel bladder changes.    Interventional History:  2/28/23  b/l TF DEMETRIA at L4-5 >40% relief   7/12/22 b/l TF DEMETRIA at L4-5 60% relief.   12/28/21 Cervical DEMETRIA >40% relief  11/9/21 b/l TF DEMETRIA at L4-5 >40% relief   B/L TF DEMETRIA L4-5 on 1/15/21 >90% relief x 6 months  B/L TF DEMETRIA L4-5 on  11/9/21 >40% relief     ROS:  CONSTITUTIONAL: No fevers, chills, night sweats, wt. loss, appetite changes  SKIN: no rashes or itching  ENT: No headaches, head trauma, vision changes, or eye pain  LYMPH NODES: None noticed   CV: No chest pain, palpitations.   RESP: No shortness of breath, dyspnea on exertion, cough, wheezing, or hemoptysis  GI: No nausea, emesis, diarrhea, constipation, melena, hematochezia, pain.    : No dysuria, hematuria, urgency, or frequency   HEME: No easy bruising, bleeding problems  PSYCHIATRIC: No depression, anxiety, psychosis, hallucinations.  NEURO: No seizures, memory loss, dizziness or difficulty sleeping  MSK:  Positive per HPI    Past Medical History:   Diagnosis Date    Allergy     Arthritis     Depression     Hypertension     Lumbar radiculitis 8/2/2018    LIZET on CPAP      Past Surgical History:   Procedure Laterality Date    BREAST SURGERY      EPIDURAL STEROID INJECTION INTO CERVICAL SPINE N/A 12/28/2021    Procedure: Injection-steroid-epidural-cervical;  Surgeon: Fidencio Vazquez MD;  Location: [facility] OR;  Service: Pain Management;  Laterality: N/A;  C7-T1     FRACTURE SURGERY      HERNIA REPAIR      SPINE SURGERY      TRANSFORAMINAL EPIDURAL INJECTION OF STEROID Bilateral 3/12/2019    Procedure: Injection,steroid,epidural,transforaminal approach L4-5;  Surgeon: Fidencio Vazquez MD;  Location: [facility] OR;  Service: Pain Management;  Laterality: Bilateral;    TRANSFORAMINAL EPIDURAL INJECTION OF STEROID Bilateral 8/9/2019    Procedure: Injection,steroid,epidural,transforaminal approach;  Surgeon: Fidencio Vazquez MD;  Location: [facility] OR;  Service: Pain Management;  Laterality: Bilateral;  L4-5    TRANSFORAMINAL EPIDURAL INJECTION OF STEROID Bilateral 11/15/2019    Procedure: Injection,steroid,epidural,transforaminal approach;  Surgeon: Fidencio Vazquez MD;  Location: [facility] OR;  Service: Pain Management;  Laterality: Bilateral;  L4-L5    TRANSFORAMINAL EPIDURAL INJECTION OF STEROID Bilateral 2/18/2020     Procedure: Injection,steroid,epidural,transforaminal approach;  Surgeon: Fidencio Vazquez MD;  Location: [facility] OR;  Service: Pain Management;  Laterality: Bilateral;  L4-5    TRANSFORAMINAL EPIDURAL INJECTION OF STEROID Bilateral 7/29/2020    Procedure: Injection,steroid,epidural,transforaminal approach;  Surgeon: Fidencio Vazquez MD;  Location: [facility] OR;  Service: Pain Management;  Laterality: Bilateral;  L4-5    TRANSFORAMINAL EPIDURAL INJECTION OF STEROID Bilateral 1/15/2021    Procedure: Injection,steroid,epidural,transforaminal approach BILATERAL L4-5;  Surgeon: Fidencio Vazquez MD;  Location: [facility] OR;  Service: Pain Management;  Laterality: Bilateral;    TRANSFORAMINAL EPIDURAL INJECTION OF STEROID Bilateral 11/9/2021    Procedure: Injection,steroid,epidural,transforaminal approach;  Surgeon: Fidencio Vazquez MD;  Location: [facility] OR;  Service: Pain Management;  Laterality: Bilateral;  L4-5    TRANSFORAMINAL EPIDURAL INJECTION OF STEROID Bilateral 7/12/2022    Procedure: INJECTION, STEROID, EPIDURAL, TRANSFORAMINAL APPROACH;  Surgeon: Fidencio Vazquez MD;  Location: [facility] OR;  Service: Pain Management;  Laterality: Bilateral;  L4-5, L5-S1    TRANSFORAMINAL EPIDURAL INJECTION OF STEROID Bilateral 2/28/2023    Procedure: Injection,steroid,epidural,transforaminal approach;  Surgeon: Fidencio Vazquez MD;  Location: [facility] OR;  Service: Pain Management;  Laterality: Bilateral;  L4-5, L5-s1 Gerald TFESI    TRANSFORAMINAL EPIDURAL INJECTION OF STEROID Bilateral 9/1/2023    Procedure: Injection,steroid,epidural,transforaminal approach;  Surgeon: Fidencio Vazquez MD;  Location: [facility] OR;  Service: Anesthesiology;  Laterality: Bilateral;  L4-5     No family history on file.  Social History     Socioeconomic History    Marital status:    Tobacco Use    Smoking status: Never    Smokeless tobacco: Never   Substance and Sexual Activity    Alcohol use: Yes     Alcohol/week: 1.0 standard drink of alcohol     Types: 1 Cans of beer per week    Drug use: No     "Sexual activity: Yes       Medications/Allergies: See med card    Vitals:    12/27/23 0959   BP: (!) 141/87   Pulse: 70   Weight: 81.2 kg (179 lb)   Height: 5' 9" (1.753 m)   PainSc:   5   PainLoc: Neck       Physical exam:    GENERAL: A and O x3, the patient appears well groomed and is in no acute distress.  Skin: No rashes or obvious lesions  HEENT: normocephalic, atraumatic  CARDIOVASCULAR:  RRR  LUNGS: non labored breathing  ABDOMEN: soft, nontender   UPPER EXTREMITIES: Normal alignment, normal range of motion, no atrophy, no skin changes,  hair growth and nail growth normal and equal bilaterally. No swelling, no tenderness.    LOWER EXTREMITIES:  Normal alignment, normal range of motion, no atrophy, no skin changes,  hair growth and nail growth normal and equal bilaterally. No swelling, no tenderness.  CERVICAL SPINE:  Cervical spine: ROM is limited in flexion, extension and lateral rotation with moderate increased pain.  Spurling's maneuver causes no neck pain to either side.  Myofascial exam: No Tenderness to palpation across cervical paraspinous region bilaterally.    LUMBAR SPINE  Lumbar spine: ROM is full with flexion extension and oblique extension with moderate increased pain.    Darrel's test causes no increased pain on either side.    Supine straight leg raise is negative   Internal and external rotation of the hip causes no increased pain on either side.  Myofascial exam: No tenderness to palpation across lumbar paraspinous muscles.      MENTAL STATUS: normal orientation, speech, language, and fund of knowledge for social situation.  Emotional state appropriate.    CRANIAL NERVES:  II:  PERRL bilaterally,   III,IV,VI: EOMI.    V:  Facial sensation equal bilaterally  VII:  Facial motor function normal.  VIII:  Hearing equal to finger rub bilaterally  IX/X: Gag normal, palate symmetric  XI:  Shoulder shrug equal, head turn equal  XII:  Tongue midline without fasciculations      MOTOR: Tone and bulk: " normal bilateral upper and lower Strength: normal   Delt Bi Tri WE WF     R 5 5 5 5 5 5   L 5 5 5 5 5 5     IP ADD ABD Quad TA Gas HAM  R 5 5 5 5 5 5 5  L 5 5 5 5 5 5 5    SENSATION: Light touch and pinprick intact bilaterally  REFLEXES: normal, symmetric, nonbrisk.  Toes down, no clonus. No hoffmans.  GAIT:  Uses cane for assistance     Imaging:  MRI lumbar spine 01/2016 (outside records)  Lumbar spondylosis with small posterior disc protrusion L4-5 and L5-S1 with moderate bilateral neuroforaminal narrowing at L4-5.  Has postoperative changes of a laminotomy at L4-5 noted.    MRI cervical spine 05/2015  Postoperative changes of total disc arthroplasty at C4-5 anterior fusion with interbody spaces at C5-6 and C6-7.    Assessment:  Marshall Barboza is a 53 y.o. male with neck and lower back pain  1. Lumbar radiculitis    2. DDD (degenerative disc disease), lumbar    3. Postlaminectomy syndrome of lumbar region    4. DDD (degenerative disc disease), cervical      Plan:  1. I have stressed the importance of physical activity and exercise to improve overall health  2. Monitor progress from TF lumbar epidural steroid injection bilaterally at the L4-5.   3. Consider repeat cervical DEMETRIA if neck pain worsens  4. He continue Tramadol 50 mg q day as needed.  He takes this very sparingly. Prescription sent to pharmacy.  Previous UDS consistent.   5. Follow-up 3 months   All medication management was performed by Dr. Fidencio Vazquez

## 2024-04-29 ENCOUNTER — OFFICE VISIT (OUTPATIENT)
Dept: PAIN MEDICINE | Facility: CLINIC | Age: 54
End: 2024-04-29
Payer: OTHER GOVERNMENT

## 2024-04-29 ENCOUNTER — TELEPHONE (OUTPATIENT)
Dept: PAIN MEDICINE | Facility: CLINIC | Age: 54
End: 2024-04-29

## 2024-04-29 VITALS
DIASTOLIC BLOOD PRESSURE: 96 MMHG | SYSTOLIC BLOOD PRESSURE: 158 MMHG | HEIGHT: 69 IN | WEIGHT: 179 LBS | BODY MASS INDEX: 26.51 KG/M2 | HEART RATE: 64 BPM

## 2024-04-29 DIAGNOSIS — M96.1 POSTLAMINECTOMY SYNDROME OF LUMBAR REGION: ICD-10-CM

## 2024-04-29 DIAGNOSIS — M54.16 LUMBAR RADICULITIS: Primary | ICD-10-CM

## 2024-04-29 DIAGNOSIS — M51.36 DDD (DEGENERATIVE DISC DISEASE), LUMBAR: ICD-10-CM

## 2024-04-29 DIAGNOSIS — M50.30 DDD (DEGENERATIVE DISC DISEASE), CERVICAL: ICD-10-CM

## 2024-04-29 PROCEDURE — 99999 PR PBB SHADOW E&M-EST. PATIENT-LVL III: CPT | Mod: PBBFAC,,, | Performed by: PHYSICIAN ASSISTANT

## 2024-04-29 PROCEDURE — 99213 OFFICE O/P EST LOW 20 MIN: CPT | Mod: PBBFAC,PN | Performed by: PHYSICIAN ASSISTANT

## 2024-04-29 PROCEDURE — 99214 OFFICE O/P EST MOD 30 MIN: CPT | Mod: S$PBB,,, | Performed by: PHYSICIAN ASSISTANT

## 2024-04-29 RX ORDER — TRAMADOL HYDROCHLORIDE 50 MG/1
50 TABLET ORAL EVERY 8 HOURS PRN
Qty: 30 TABLET | Refills: 0 | Status: SHIPPED | OUTPATIENT
Start: 2024-04-29 | End: 2024-05-28

## 2024-04-29 NOTE — H&P (VIEW-ONLY)
Referring Physician: No ref. provider found    PCP: Sara Quan, NP      CC:  Neck and lower back pain    Interval History:  Marshall Barboza is a 50 y.o. male with chronic neck and low back pain who presents today for f/u and medication refill. He is s/p L4-5 TF DEMETRIA that provided 60% relief.  Previous provided 90% relief. Pain has returned. Reports low back pain that radiates down bilateral lateral thighs.  Terminates at knee. L4-5 TF DEMETRIA in September  provided 60% relief x 4 months. Pain  has returned to prior intensity and frequency. Denies any weakness, b/b changes, or foot drop. Does not have any symptoms below the knee. He take Tramadol sparingly. Neck pain has improved in past s/p cervical DEMETRIA.  He has a history of C4-C7 ACDF.  Denies dropping objects or hand writing changes. Pain today is rated 7/10.     Prior HPI:   Marshall Barboza is a 54 y.o. male referred to us for neck and lower back pain.  Patient is a former active  personnel.  He has significant history of cervical and lumbar spine surgery.  He underwent L4-5 laminotomy and diskectomy in February 2015.  Radicular pain improved but he continues to have lower back pain.  He also was found to have cervical DDD.  He underwent a C4-C7 ACDF.  Lower back and right leg pain is more bothersome currently.  He has constant aching, throbbing pain in his lower back.  Pain radiates to down his right buttock into his right calf.  Pain occasionally travels down the left calf.  He has had lumbar MB RFA procedures in the past at the  with short-lived relief.  He has undergone lumbar ESIwith moderate benefit for 3 months.  He currently takes tramadol very sparingly with mild-to-moderate benefit.  He denies any worsening weakness.  No bowel bladder changes.    Interventional History:  2/28/23  b/l TF DEMETRIA at L4-5 >40% relief   7/12/22 b/l TF DEMETRIA at L4-5 60% relief.   12/28/21 Cervical DEMETRIA >40% relief  11/9/21 b/l TF DEMETRIA at L4-5 >40%  relief   B/L TF DEMETRIA L4-5 on 1/15/21 >90% relief x 6 months  B/L TF DEMETRIA L4-5 on 11/9/21 >40% relief     ROS:  CONSTITUTIONAL: No fevers, chills, night sweats, wt. loss, appetite changes  SKIN: no rashes or itching  ENT: No headaches, head trauma, vision changes, or eye pain  LYMPH NODES: None noticed   CV: No chest pain, palpitations.   RESP: No shortness of breath, dyspnea on exertion, cough, wheezing, or hemoptysis  GI: No nausea, emesis, diarrhea, constipation, melena, hematochezia, pain.    : No dysuria, hematuria, urgency, or frequency   HEME: No easy bruising, bleeding problems  PSYCHIATRIC: No depression, anxiety, psychosis, hallucinations.  NEURO: No seizures, memory loss, dizziness or difficulty sleeping  MSK:  Positive per HPI    Past Medical History:   Diagnosis Date    Allergy     Arthritis     Depression     Hypertension     Lumbar radiculitis 8/2/2018    LIZET on CPAP      Past Surgical History:   Procedure Laterality Date    BREAST SURGERY      EPIDURAL STEROID INJECTION INTO CERVICAL SPINE N/A 12/28/2021    Procedure: Injection-steroid-epidural-cervical;  Surgeon: Fidencio Vazquez MD;  Location: [facility] OR;  Service: Pain Management;  Laterality: N/A;  C7-T1     FRACTURE SURGERY      HERNIA REPAIR      SPINE SURGERY      TRANSFORAMINAL EPIDURAL INJECTION OF STEROID Bilateral 3/12/2019    Procedure: Injection,steroid,epidural,transforaminal approach L4-5;  Surgeon: Fidencio Vazquez MD;  Location: [facility] OR;  Service: Pain Management;  Laterality: Bilateral;    TRANSFORAMINAL EPIDURAL INJECTION OF STEROID Bilateral 8/9/2019    Procedure: Injection,steroid,epidural,transforaminal approach;  Surgeon: Fidencio Vazquez MD;  Location: [facility] OR;  Service: Pain Management;  Laterality: Bilateral;  L4-5    TRANSFORAMINAL EPIDURAL INJECTION OF STEROID Bilateral 11/15/2019    Procedure: Injection,steroid,epidural,transforaminal approach;  Surgeon: Fidencio Vazquez MD;  Location: [facility] OR;  Service: Pain Management;  Laterality: Bilateral;   L4-L5    TRANSFORAMINAL EPIDURAL INJECTION OF STEROID Bilateral 2/18/2020    Procedure: Injection,steroid,epidural,transforaminal approach;  Surgeon: Fidencio Vazquez MD;  Location: [facility] OR;  Service: Pain Management;  Laterality: Bilateral;  L4-5    TRANSFORAMINAL EPIDURAL INJECTION OF STEROID Bilateral 7/29/2020    Procedure: Injection,steroid,epidural,transforaminal approach;  Surgeon: Fidencio Vazquez MD;  Location: [facility] OR;  Service: Pain Management;  Laterality: Bilateral;  L4-5    TRANSFORAMINAL EPIDURAL INJECTION OF STEROID Bilateral 1/15/2021    Procedure: Injection,steroid,epidural,transforaminal approach BILATERAL L4-5;  Surgeon: Fidencio Vazquez MD;  Location: [facility] OR;  Service: Pain Management;  Laterality: Bilateral;    TRANSFORAMINAL EPIDURAL INJECTION OF STEROID Bilateral 11/9/2021    Procedure: Injection,steroid,epidural,transforaminal approach;  Surgeon: Fidencio Vazquez MD;  Location: [facility] OR;  Service: Pain Management;  Laterality: Bilateral;  L4-5    TRANSFORAMINAL EPIDURAL INJECTION OF STEROID Bilateral 7/12/2022    Procedure: INJECTION, STEROID, EPIDURAL, TRANSFORAMINAL APPROACH;  Surgeon: Fidencio Vazquez MD;  Location: [facility] OR;  Service: Pain Management;  Laterality: Bilateral;  L4-5, L5-S1    TRANSFORAMINAL EPIDURAL INJECTION OF STEROID Bilateral 2/28/2023    Procedure: Injection,steroid,epidural,transforaminal approach;  Surgeon: Fidencio Vazquez MD;  Location: [facility] OR;  Service: Pain Management;  Laterality: Bilateral;  L4-5, L5-s1 Gerald TFESI    TRANSFORAMINAL EPIDURAL INJECTION OF STEROID Bilateral 9/1/2023    Procedure: Injection,steroid,epidural,transforaminal approach;  Surgeon: Fidencio Vazquez MD;  Location: [facility] OR;  Service: Anesthesiology;  Laterality: Bilateral;  L4-5     No family history on file.  Social History     Socioeconomic History    Marital status:    Tobacco Use    Smoking status: Never    Smokeless tobacco: Never   Substance and Sexual Activity    Alcohol use: Yes     Alcohol/week: 1.0  "standard drink of alcohol     Types: 1 Cans of beer per week    Drug use: No    Sexual activity: Yes       Medications/Allergies: See med card    Vitals:    04/29/24 1310   BP: (!) 158/96   Pulse: 64   Weight: 81.2 kg (179 lb 0.2 oz)   Height: 5' 9" (1.753 m)   PainSc:   7   PainLoc: Neck       Physical exam:    GENERAL: A and O x3, the patient appears well groomed and is in no acute distress.  Skin: No rashes or obvious lesions  HEENT: normocephalic, atraumatic  CARDIOVASCULAR:  RRR  LUNGS: non labored breathing  ABDOMEN: soft, nontender   UPPER EXTREMITIES: Normal alignment, normal range of motion, no atrophy, no skin changes,  hair growth and nail growth normal and equal bilaterally. No swelling, no tenderness.    LOWER EXTREMITIES:  Normal alignment, normal range of motion, no atrophy, no skin changes,  hair growth and nail growth normal and equal bilaterally. No swelling, no tenderness.  CERVICAL SPINE:  Cervical spine: ROM is limited in flexion, extension and lateral rotation with moderate increased pain.  Spurling's maneuver causes no neck pain to either side.  Myofascial exam: No Tenderness to palpation across cervical paraspinous region bilaterally.    LUMBAR SPINE  Lumbar spine: ROM is full with flexion extension and oblique extension with moderate increased pain.    Darrel's test causes no increased pain on either side.    Supine straight leg raise is negative   Internal and external rotation of the hip causes no increased pain on either side.  Myofascial exam: No tenderness to palpation across lumbar paraspinous muscles.      MENTAL STATUS: normal orientation, speech, language, and fund of knowledge for social situation.  Emotional state appropriate.    CRANIAL NERVES:  II:  PERRL bilaterally,   III,IV,VI: EOMI.    V:  Facial sensation equal bilaterally  VII:  Facial motor function normal.  VIII:  Hearing equal to finger rub bilaterally  IX/X: Gag normal, palate symmetric  XI:  Shoulder shrug equal, " head turn equal  XII:  Tongue midline without fasciculations      MOTOR: Tone and bulk: normal bilateral upper and lower Strength: normal   Delt Bi Tri WE WF     R 5 5 5 5 5 5   L 5 5 5 5 5 5     IP ADD ABD Quad TA Gas HAM  R 5 5 5 5 5 5 5  L 5 5 5 5 5 5 5    SENSATION: Light touch and pinprick intact bilaterally  REFLEXES: normal, symmetric, nonbrisk.  Toes down, no clonus. No hoffmans.  GAIT:  Uses cane for assistance     Imaging:  MRI lumbar spine 01/2016 (outside records)  Lumbar spondylosis with small posterior disc protrusion L4-5 and L5-S1 with moderate bilateral neuroforaminal narrowing at L4-5.  Has postoperative changes of a laminotomy at L4-5 noted.    MRI cervical spine 05/2015  Postoperative changes of total disc arthroplasty at C4-5 anterior fusion with interbody spaces at C5-6 and C6-7.    Assessment:  Marshall Barboza is a 54 y.o. male with neck and lower back pain  1. Lumbar radiculitis    2. DDD (degenerative disc disease), lumbar    3. Postlaminectomy syndrome of lumbar region    4. DDD (degenerative disc disease), cervical      Plan:  1. I have stressed the importance of physical activity and exercise to improve overall health  2. Scheduled TF lumbar epidural steroid injection bilaterally at the L4-5. I have explained the risks, benefits, and alternatives of the procedure in detail. The patient voices understanding and all questions have been answered. The patient agrees to proceed as planned. Written Consent obtained.   3. Consider repeat cervical DEMETRIA if neck pain worsens  4. He continue Tramadol 50 mg q day as needed.  He takes this very sparingly. Prescription sent to pharmacy.  Previous UDS consistent.   5.  Follow up with PCP regarding elevated BP  6. Follow-up 3 months   All medication management was performed by Dr. Fidencio Vazquez

## 2024-04-29 NOTE — PROGRESS NOTES
Referring Physician: No ref. provider found    PCP: Sara Quan, NP      CC:  Neck and lower back pain    Interval History:  Marshall Barboza is a 50 y.o. male with chronic neck and low back pain who presents today for f/u and medication refill. He is s/p L4-5 TF DEMETRIA that provided 60% relief.  Previous provided 90% relief. Pain has returned. Reports low back pain that radiates down bilateral lateral thighs.  Terminates at knee. L4-5 TF DEMETRIA in September  provided 60% relief x 4 months. Pain  has returned to prior intensity and frequency. Denies any weakness, b/b changes, or foot drop. Does not have any symptoms below the knee. He take Tramadol sparingly. Neck pain has improved in past s/p cervical DEMETRIA.  He has a history of C4-C7 ACDF.  Denies dropping objects or hand writing changes. Pain today is rated 7/10.     Prior HPI:   Marshall Barboza is a 54 y.o. male referred to us for neck and lower back pain.  Patient is a former active  personnel.  He has significant history of cervical and lumbar spine surgery.  He underwent L4-5 laminotomy and diskectomy in February 2015.  Radicular pain improved but he continues to have lower back pain.  He also was found to have cervical DDD.  He underwent a C4-C7 ACDF.  Lower back and right leg pain is more bothersome currently.  He has constant aching, throbbing pain in his lower back.  Pain radiates to down his right buttock into his right calf.  Pain occasionally travels down the left calf.  He has had lumbar MB RFA procedures in the past at the  with short-lived relief.  He has undergone lumbar ESIwith moderate benefit for 3 months.  He currently takes tramadol very sparingly with mild-to-moderate benefit.  He denies any worsening weakness.  No bowel bladder changes.    Interventional History:  2/28/23  b/l TF DEMETRIA at L4-5 >40% relief   7/12/22 b/l TF DEMETRIA at L4-5 60% relief.   12/28/21 Cervical DEMETRIA >40% relief  11/9/21 b/l TF DEMETRIA at L4-5 >40%  relief   B/L TF DEMETRIA L4-5 on 1/15/21 >90% relief x 6 months  B/L TF DEMETRIA L4-5 on 11/9/21 >40% relief     ROS:  CONSTITUTIONAL: No fevers, chills, night sweats, wt. loss, appetite changes  SKIN: no rashes or itching  ENT: No headaches, head trauma, vision changes, or eye pain  LYMPH NODES: None noticed   CV: No chest pain, palpitations.   RESP: No shortness of breath, dyspnea on exertion, cough, wheezing, or hemoptysis  GI: No nausea, emesis, diarrhea, constipation, melena, hematochezia, pain.    : No dysuria, hematuria, urgency, or frequency   HEME: No easy bruising, bleeding problems  PSYCHIATRIC: No depression, anxiety, psychosis, hallucinations.  NEURO: No seizures, memory loss, dizziness or difficulty sleeping  MSK:  Positive per HPI    Past Medical History:   Diagnosis Date    Allergy     Arthritis     Depression     Hypertension     Lumbar radiculitis 8/2/2018    LIZET on CPAP      Past Surgical History:   Procedure Laterality Date    BREAST SURGERY      EPIDURAL STEROID INJECTION INTO CERVICAL SPINE N/A 12/28/2021    Procedure: Injection-steroid-epidural-cervical;  Surgeon: Fidencio Vazquez MD;  Location: [facility] OR;  Service: Pain Management;  Laterality: N/A;  C7-T1     FRACTURE SURGERY      HERNIA REPAIR      SPINE SURGERY      TRANSFORAMINAL EPIDURAL INJECTION OF STEROID Bilateral 3/12/2019    Procedure: Injection,steroid,epidural,transforaminal approach L4-5;  Surgeon: Fidencio Vazquez MD;  Location: [facility] OR;  Service: Pain Management;  Laterality: Bilateral;    TRANSFORAMINAL EPIDURAL INJECTION OF STEROID Bilateral 8/9/2019    Procedure: Injection,steroid,epidural,transforaminal approach;  Surgeon: Fidencio Vazquez MD;  Location: [facility] OR;  Service: Pain Management;  Laterality: Bilateral;  L4-5    TRANSFORAMINAL EPIDURAL INJECTION OF STEROID Bilateral 11/15/2019    Procedure: Injection,steroid,epidural,transforaminal approach;  Surgeon: Fidencio Vazquez MD;  Location: [facility] OR;  Service: Pain Management;  Laterality: Bilateral;   L4-L5    TRANSFORAMINAL EPIDURAL INJECTION OF STEROID Bilateral 2/18/2020    Procedure: Injection,steroid,epidural,transforaminal approach;  Surgeon: Fidencio Vazquez MD;  Location: [facility] OR;  Service: Pain Management;  Laterality: Bilateral;  L4-5    TRANSFORAMINAL EPIDURAL INJECTION OF STEROID Bilateral 7/29/2020    Procedure: Injection,steroid,epidural,transforaminal approach;  Surgeon: Fidencio Vazquez MD;  Location: [facility] OR;  Service: Pain Management;  Laterality: Bilateral;  L4-5    TRANSFORAMINAL EPIDURAL INJECTION OF STEROID Bilateral 1/15/2021    Procedure: Injection,steroid,epidural,transforaminal approach BILATERAL L4-5;  Surgeon: Fidencio Vazquez MD;  Location: [facility] OR;  Service: Pain Management;  Laterality: Bilateral;    TRANSFORAMINAL EPIDURAL INJECTION OF STEROID Bilateral 11/9/2021    Procedure: Injection,steroid,epidural,transforaminal approach;  Surgeon: Fiedncio Vazquez MD;  Location: [facility] OR;  Service: Pain Management;  Laterality: Bilateral;  L4-5    TRANSFORAMINAL EPIDURAL INJECTION OF STEROID Bilateral 7/12/2022    Procedure: INJECTION, STEROID, EPIDURAL, TRANSFORAMINAL APPROACH;  Surgeon: Fidencio Vazquez MD;  Location: [facility] OR;  Service: Pain Management;  Laterality: Bilateral;  L4-5, L5-S1    TRANSFORAMINAL EPIDURAL INJECTION OF STEROID Bilateral 2/28/2023    Procedure: Injection,steroid,epidural,transforaminal approach;  Surgeon: Fidencio Vazquez MD;  Location: [facility] OR;  Service: Pain Management;  Laterality: Bilateral;  L4-5, L5-s1 Gerald TFESI    TRANSFORAMINAL EPIDURAL INJECTION OF STEROID Bilateral 9/1/2023    Procedure: Injection,steroid,epidural,transforaminal approach;  Surgeon: Fidencio Vazquez MD;  Location: [facility] OR;  Service: Anesthesiology;  Laterality: Bilateral;  L4-5     No family history on file.  Social History     Socioeconomic History    Marital status:    Tobacco Use    Smoking status: Never    Smokeless tobacco: Never   Substance and Sexual Activity    Alcohol use: Yes     Alcohol/week: 1.0  "standard drink of alcohol     Types: 1 Cans of beer per week    Drug use: No    Sexual activity: Yes       Medications/Allergies: See med card    Vitals:    04/29/24 1310   BP: (!) 158/96   Pulse: 64   Weight: 81.2 kg (179 lb 0.2 oz)   Height: 5' 9" (1.753 m)   PainSc:   7   PainLoc: Neck       Physical exam:    GENERAL: A and O x3, the patient appears well groomed and is in no acute distress.  Skin: No rashes or obvious lesions  HEENT: normocephalic, atraumatic  CARDIOVASCULAR:  RRR  LUNGS: non labored breathing  ABDOMEN: soft, nontender   UPPER EXTREMITIES: Normal alignment, normal range of motion, no atrophy, no skin changes,  hair growth and nail growth normal and equal bilaterally. No swelling, no tenderness.    LOWER EXTREMITIES:  Normal alignment, normal range of motion, no atrophy, no skin changes,  hair growth and nail growth normal and equal bilaterally. No swelling, no tenderness.  CERVICAL SPINE:  Cervical spine: ROM is limited in flexion, extension and lateral rotation with moderate increased pain.  Spurling's maneuver causes no neck pain to either side.  Myofascial exam: No Tenderness to palpation across cervical paraspinous region bilaterally.    LUMBAR SPINE  Lumbar spine: ROM is full with flexion extension and oblique extension with moderate increased pain.    Darrel's test causes no increased pain on either side.    Supine straight leg raise is negative   Internal and external rotation of the hip causes no increased pain on either side.  Myofascial exam: No tenderness to palpation across lumbar paraspinous muscles.      MENTAL STATUS: normal orientation, speech, language, and fund of knowledge for social situation.  Emotional state appropriate.    CRANIAL NERVES:  II:  PERRL bilaterally,   III,IV,VI: EOMI.    V:  Facial sensation equal bilaterally  VII:  Facial motor function normal.  VIII:  Hearing equal to finger rub bilaterally  IX/X: Gag normal, palate symmetric  XI:  Shoulder shrug equal, " head turn equal  XII:  Tongue midline without fasciculations      MOTOR: Tone and bulk: normal bilateral upper and lower Strength: normal   Delt Bi Tri WE WF     R 5 5 5 5 5 5   L 5 5 5 5 5 5     IP ADD ABD Quad TA Gas HAM  R 5 5 5 5 5 5 5  L 5 5 5 5 5 5 5    SENSATION: Light touch and pinprick intact bilaterally  REFLEXES: normal, symmetric, nonbrisk.  Toes down, no clonus. No hoffmans.  GAIT:  Uses cane for assistance     Imaging:  MRI lumbar spine 01/2016 (outside records)  Lumbar spondylosis with small posterior disc protrusion L4-5 and L5-S1 with moderate bilateral neuroforaminal narrowing at L4-5.  Has postoperative changes of a laminotomy at L4-5 noted.    MRI cervical spine 05/2015  Postoperative changes of total disc arthroplasty at C4-5 anterior fusion with interbody spaces at C5-6 and C6-7.    Assessment:  Marshall Barboza is a 54 y.o. male with neck and lower back pain  1. Lumbar radiculitis    2. DDD (degenerative disc disease), lumbar    3. Postlaminectomy syndrome of lumbar region    4. DDD (degenerative disc disease), cervical      Plan:  1. I have stressed the importance of physical activity and exercise to improve overall health  2. Scheduled TF lumbar epidural steroid injection bilaterally at the L4-5. I have explained the risks, benefits, and alternatives of the procedure in detail. The patient voices understanding and all questions have been answered. The patient agrees to proceed as planned. Written Consent obtained.   3. Consider repeat cervical DEMETRIA if neck pain worsens  4. He continue Tramadol 50 mg q day as needed.  He takes this very sparingly. Prescription sent to pharmacy.  Previous UDS consistent.   5.  Follow up with PCP regarding elevated BP  6. Follow-up 3 months   All medication management was performed by Dr. Fidencio Vazquez

## 2024-04-29 NOTE — TELEPHONE ENCOUNTER
Types of orders made on 04/29/2024: Procedure Request      Order Date:4/29/2024   Ordering User:KAYLEIGH PAVON [[id number]]   Encounter Provider:Duyen Pavon PA-C [[id number]]   Authorizing Provider: Kayleigh Pavon PA-C [[id number]]   Supervising Provider:TIARA OSORIO [[id number]]   Type of Supervision:Supervision Required   Department:[facility] PAIN [facility]NAGEMENT[[id number]]      Common Order Information   Procedure -> Transforaminal Injection (Specify level and laterality) Cmt: B/L             L4-5      Pre-op Diagnosis -> Lumbar radiculitis      Order Specific Information   Order: Proce   dure Order to Pain Management [Custom: [id number]]  Order #:          [id number]: 1 FUTURE     Priority: Routine  Class: Clinic Performed     Future Order Information       Expires on:04/29/2025            Expected by:04/29/2024                   Associated Diagnoses       M54.16 Lumbar radiculitis       Physician -> tiara osorio          Is patient on anti-coagulants? -> No          Facility Name: -> [facility]          Foll   ow-up: -> 4 weeks              Priority: Routine  Class: Clinic Performed     Future Order Information       Expires on:04/29/2025            Expected by:04/29/2024                   Associated Diagnoses       M54.16 Lumbar radiculitis       Procedure -> Transforaminal Injection (Specify level and laterality) Cmt:                 B/L L4-5          Physician -> tiara osorio          Is patient on anti-coagulants? -> No          Pr   e-op Diagnosis -> Lumbar radiculitis

## 2024-05-06 NOTE — DISCHARGE INSTRUCTIONS
Before leaving, please make sure you have all your personal belongings such as glasses, purses, wallets, keys, cell phones, jewelry, jackets etc   Pain injection instructions:     This procedure may take a couple weeks to relieve pain  You may get some pain relief from the local anesthetic initally.   Steroids can have side effects of flushed face or nervous feeling.    No driving for 24 hrs.   Activity as tolerated- gradually increase activities.  Dont lift over 10 lbs for 24 hrs   No heat at injection sites for 2 full days. No heating pads, hot tubs, saunas, or swimming in any body of water or pool for 2 full days.  Use ice pack for mild swelling and for comfort , apply for 20 minutes, remove for 20 minute intervals. No direct contact of ice itself  to skin.  May shower today if drowsy.  Do not allow shower water to beat on injections site(s) for 2 full days. No tub baths for two full days.      Resume Aspirin, Plavix, or Coumadin the day after the procedure unless otherwise instructed.   If diabetic,monitor your glucose carefully as steroids can increase your glucose level    Seek immediate medical help for:     Severe increase in pain not relieved by medication or ice or any new pain in the region .    Prolonged (more than 24 hrs)or increasing weakness or numbness in the legs or arms. - it is normal to have weakness/numbness for up to 8 hrs.   Fever above 100 degrees F , Drainage,redness,active bleeding, or increased swelling at the injection site.  Headache that increases when sitting up, but decreases when lying down.  New shortness of breath, chest pain, or breathing problems.    After Surgery:  Always be aware that any surgery can cause these symptoms:    Pain- Medication can be prescribed for pain to decrease your pain but may not completely take your pain away. Over the Counter pain medicine my be enough and you can always use Ice and rest to help ease pain.    Bleeding- a little bleeding after a surgery  is usually within normal.  If there is a lot of blood you need to notify your MD.  Emergency treatments of bleeding are cold application, elevation of the bleeding site and compression.    Infection- Infection after surgery is NOT a normal occurrence.  Signs of infection are fever, swelling, hot to touch the incision.  If this occurs notify your MD immediately.    Nausea- this can be common after a surgery especially if you have had anesthesia medicine or are taking pain medicine.  Steroids have a side effect of nausea sometimes. Staying on clear liquids, bland foods, gingerale, or over the counter anti nausea medicines can help.  If you vomit more than once, notify your MD.  Anti Nausea medicines can be prescribed.

## 2024-05-10 ENCOUNTER — HOSPITAL ENCOUNTER (OUTPATIENT)
Facility: HOSPITAL | Age: 54
Discharge: HOME | End: 2024-05-10
Attending: ANESTHESIOLOGY | Admitting: ANESTHESIOLOGY
Payer: OTHER GOVERNMENT

## 2024-05-10 DIAGNOSIS — M54.16 LUMBAR RADICULITIS: ICD-10-CM

## 2024-05-10 PROCEDURE — 25500020 PHARM REV CODE 255: Performed by: ANESTHESIOLOGY

## 2024-05-10 PROCEDURE — 64483 NJX AA&/STRD TFRM EPI L/S 1: CPT | Mod: 50 | Performed by: ANESTHESIOLOGY

## 2024-05-10 PROCEDURE — 25000003 PHARM REV CODE 250: Performed by: ANESTHESIOLOGY

## 2024-05-10 PROCEDURE — 63600175 PHARM REV CODE 636 W HCPCS: Mod: JZ,JG | Performed by: ANESTHESIOLOGY

## 2024-05-10 PROCEDURE — 64483 NJX AA&/STRD TFRM EPI L/S 1: CPT | Mod: 50,,, | Performed by: ANESTHESIOLOGY

## 2024-05-10 RX ORDER — DEXAMETHASONE SODIUM PHOSPHATE 10 MG/ML
INJECTION INTRAMUSCULAR; INTRAVENOUS
Status: DISCONTINUED | OUTPATIENT
Start: 2024-05-10 | End: 2024-05-10 | Stop reason: HOSPADM

## 2024-05-10 RX ORDER — LIDOCAINE HYDROCHLORIDE 10 MG/ML
1 INJECTION, SOLUTION EPIDURAL; INFILTRATION; INTRACAUDAL; PERINEURAL ONCE
Status: ACTIVE | OUTPATIENT
Start: 2024-05-10

## 2024-05-10 RX ORDER — LIDOCAINE HYDROCHLORIDE 10 MG/ML
INJECTION, SOLUTION EPIDURAL; INFILTRATION; INTRACAUDAL; PERINEURAL
Status: DISCONTINUED | OUTPATIENT
Start: 2024-05-10 | End: 2024-05-10 | Stop reason: HOSPADM

## 2024-05-10 RX ORDER — BUPIVACAINE HYDROCHLORIDE 2.5 MG/ML
INJECTION, SOLUTION EPIDURAL; INFILTRATION; INTRACAUDAL
Status: DISCONTINUED | OUTPATIENT
Start: 2024-05-10 | End: 2024-05-10 | Stop reason: HOSPADM

## 2024-05-10 RX ORDER — SODIUM CHLORIDE, SODIUM LACTATE, POTASSIUM CHLORIDE, CALCIUM CHLORIDE 600; 310; 30; 20 MG/100ML; MG/100ML; MG/100ML; MG/100ML
INJECTION, SOLUTION INTRAVENOUS CONTINUOUS
Status: ACTIVE | OUTPATIENT
Start: 2024-05-10

## 2024-05-10 NOTE — DISCHARGE SUMMARY
[facility] - Periop Services  Discharge Note  Short Stay    Procedure(s) (LRB):  Injection,steroid,epidural,transforaminal approach (Bilateral)      OUTCOME: Patient tolerated treatment/procedure well without complication and is now ready for discharge.    DISPOSITION: Home or Self Care    FINAL DIAGNOSIS:  <principal problem not specified>    FOLLOWUP: In clinic    DISCHARGE INSTRUCTIONS:    Discharge Procedure Orders   Notify your health care provider if you experience any of the following:  temperature >100.4     Notify your health care provider if you experience any of the following:  severe uncontrolled pain     Notify your health care provider if you experience any of the following:  redness, tenderness, or signs of infection (pain, swelling, redness, odor or green/yellow discharge around incision site)     Activity as tolerated        TIME SPENT ON DISCHARGE: 30 minutes

## 2024-05-10 NOTE — OP NOTE
PROCEDURE DATE: 5/10/2024    PROCEDURE: Bilateral L4-5 transforaminal epidural steroid injection under fluoroscopy    DIAGNOSIS: Lumbar radiculitis    Post op diagnosis: Same    PHYSICIAN: Fidencio Vazquez MD    MEDICATIONS INJECTED:  Dexamethasone 5mg (0.5ml) and 1.5ml 0.25% bupivicaine at each nerve root.     LOCAL ANESTHETIC INJECTED:  Lidocaine 1%. 2 ml per site.    SEDATION MEDICATIONS: None    ESTIMATED BLOOD LOSS:  None    COMPLICATIONS:  None    TECHNIQUE:   A time-out was taken to identify patient and procedure side prior to starting the procedure. The patient was placed in a prone position, prepped and draped in the usual sterile fashion using ChloraPrep and sterile towels.  The area to be injected was determined under fluoroscopic guidance in AP and oblique view.  Local anesthetic was given by raising a wheal and going down to the hub of a 25-gauge 1.5 inch needle.  In oblique view, a 3.5 inch 22-gauge bent-tip spinal needle was introduced towards 6 oclock position of the pedicle of each above named nerve root level.  The needle was walked medially then hinged into the neural foramen and position was confirmed in AP and lateral views.  1ml contrast dye was injected to confirm appropriate placement and that there was no vascular uptake.  After negative aspiration for blood or CSF, the medication was then injected. This was performed at the bilateral L4-5 level(s). The patient tolerated the procedure well.    The patient was monitored after the procedure.  Patient was given post procedure and discharge instructions to follow at home. The patient was discharged in a stable condition.

## 2024-05-10 NOTE — PLAN OF CARE
Patient is awake and alert and ready to go home; he will be driven home  via [employer] . Patient's vital signs and injection site stable. Patient denies pain, nausea weakness or dizziness. All patient belongings have been returned to patient. Patient is in stable condition and able to ambulate independently.

## 2024-05-15 VITALS
TEMPERATURE: 98 F | HEIGHT: 69 IN | OXYGEN SATURATION: 98 % | WEIGHT: 179 LBS | DIASTOLIC BLOOD PRESSURE: 96 MMHG | HEART RATE: 64 BPM | BODY MASS INDEX: 26.51 KG/M2 | SYSTOLIC BLOOD PRESSURE: 145 MMHG | RESPIRATION RATE: 16 BRPM

## 2024-07-25 ENCOUNTER — OFFICE VISIT (OUTPATIENT)
Dept: PAIN MEDICINE | Facility: CLINIC | Age: 54
End: 2024-07-25
Payer: OTHER GOVERNMENT

## 2024-07-25 VITALS
BODY MASS INDEX: 26.51 KG/M2 | DIASTOLIC BLOOD PRESSURE: 91 MMHG | WEIGHT: 179 LBS | HEIGHT: 69 IN | HEART RATE: 78 BPM | SYSTOLIC BLOOD PRESSURE: 135 MMHG

## 2024-07-25 DIAGNOSIS — M96.1 POSTLAMINECTOMY SYNDROME OF LUMBAR REGION: ICD-10-CM

## 2024-07-25 DIAGNOSIS — M54.16 LUMBAR RADICULITIS: Primary | ICD-10-CM

## 2024-07-25 DIAGNOSIS — M51.36 DDD (DEGENERATIVE DISC DISEASE), LUMBAR: ICD-10-CM

## 2024-07-25 PROCEDURE — 99213 OFFICE O/P EST LOW 20 MIN: CPT | Mod: PBBFAC,PN | Performed by: PHYSICIAN ASSISTANT

## 2024-07-25 PROCEDURE — 99999 PR PBB SHADOW E&M-EST. PATIENT-LVL III: CPT | Mod: PBBFAC,,, | Performed by: PHYSICIAN ASSISTANT

## 2024-07-25 PROCEDURE — 99214 OFFICE O/P EST MOD 30 MIN: CPT | Mod: S$PBB,,, | Performed by: PHYSICIAN ASSISTANT

## 2024-07-25 RX ORDER — TRAMADOL HYDROCHLORIDE 50 MG/1
50 TABLET ORAL EVERY 8 HOURS PRN
Qty: 30 TABLET | Refills: 0 | Status: SHIPPED | OUTPATIENT
Start: 2024-07-25 | End: 2024-08-23

## 2024-07-25 NOTE — PROGRESS NOTES
Referring Physician: No ref. provider found    PCP: Sara Quan, MINDY      CC:  Neck and lower back pain    Interval History:  Marshall Barboza is a 50 y.o. male with chronic neck and low back pain who presents today for f/u and medication refill. He is s/p b/l  L4-5 TF DEMETRIA that provided 60% relief.  Previous provided 90% relief.  Low back pain  radiates down bilateral lateral thighs.  Terminates at knee. Tolerable after procedure. L4-5 TF DEMETRIA in September  provided 60% relief x 4 months. Denies any weakness, b/b changes, or foot drop. Does not have any symptoms below the knee. He take Tramadol sparingly. Neck pain has improved in past s/p cervical DEMETRIA.  He has a history of C4-C7 ACDF.  Denies dropping objects or hand writing changes. Pain today is rated 6/10.     Prior HPI:   Marshall Barboza is a 54 y.o. male referred to us for neck and lower back pain.  Patient is a former active  personnel.  He has significant history of cervical and lumbar spine surgery.  He underwent L4-5 laminotomy and diskectomy in February 2015.  Radicular pain improved but he continues to have lower back pain.  He also was found to have cervical DDD.  He underwent a C4-C7 ACDF.  Lower back and right leg pain is more bothersome currently.  He has constant aching, throbbing pain in his lower back.  Pain radiates to down his right buttock into his right calf.  Pain occasionally travels down the left calf.  He has had lumbar MB RFA procedures in the past at the  with short-lived relief.  He has undergone lumbar ESIwith moderate benefit for 3 months.  He currently takes tramadol very sparingly with mild-to-moderate benefit.  He denies any worsening weakness.  No bowel bladder changes.    Interventional History:  5/10/23  b/l TF DEMETRIA at L4-5 >50% relief  9/1/23  b/l TF DEMETRIA at L4-5 60% relief   2/28/23  b/l TF DEMETRIA at L4-5 >40% relief   7/12/22 b/l TF DEMETRIA at L4-5 60% relief.   12/28/21 Cervical DEMETRIA >40%  relief  11/9/21 b/l TF DEMETRIA at L4-5 >40% relief   B/L TF DEMETRIA L4-5 on 1/15/21 >90% relief x 6 months  B/L TF DEMETRIA L4-5 on 11/9/21 >40% relief     ROS:  CONSTITUTIONAL: No fevers, chills, night sweats, wt. loss, appetite changes  SKIN: no rashes or itching  ENT: No headaches, head trauma, vision changes, or eye pain  LYMPH NODES: None noticed   CV: No chest pain, palpitations.   RESP: No shortness of breath, dyspnea on exertion, cough, wheezing, or hemoptysis  GI: No nausea, emesis, diarrhea, constipation, melena, hematochezia, pain.    : No dysuria, hematuria, urgency, or frequency   HEME: No easy bruising, bleeding problems  PSYCHIATRIC: No depression, anxiety, psychosis, hallucinations.  NEURO: No seizures, memory loss, dizziness or difficulty sleeping  MSK:  Positive per HPI    Past Medical History:   Diagnosis Date    Allergy     Arthritis     Depression     Hypertension     Lumbar radiculitis 8/2/2018    LIZET on CPAP      Past Surgical History:   Procedure Laterality Date    BREAST SURGERY      EPIDURAL STEROID INJECTION INTO CERVICAL SPINE N/A 12/28/2021    Procedure: Injection-steroid-epidural-cervical;  Surgeon: Fidencio Vazquez MD;  [facility]  Service: Pain Management;  Laterality: N/A;  C7-T1     FRACTURE SURGERY      HERNIA REPAIR      SPINE SURGERY      TRANSFORAMINAL EPIDURAL INJECTION OF STEROID Bilateral 3/12/2019    Procedure: Injection,steroid,epidural,transforaminal approach L4-5;  Surgeon: Fidencio Vazquez MD;  [facility];  Service: Pain Management;  Laterality: Bilateral;    TRANSFORAMINAL EPIDURAL INJECTION OF STEROID Bilateral 8/9/2019    Procedure: Injection,steroid,epidural,transforaminal approach;  Surgeon: Fidencio Vazquez MD;  [facility];  Service: Pain Management;  Laterality: Bilateral;  L4-5    TRANSFORAMINAL EPIDURAL INJECTION OF STEROID Bilateral 11/15/2019    Procedure: Injection,steroid,epidural,transforaminal approach;  Surgeon: Fidencio Vazquez MD;  [facility];  Service:  Pain Management;  Laterality: Bilateral;  L4-L5    TRANSFORAMINAL EPIDURAL INJECTION OF STEROID Bilateral 2/18/2020    Procedure: Injection,steroid,epidural,transforaminal approach;  Surgeon: Fidencio Vazquez MD;  Location: [facility] OR;  Service: Pain Management;  Laterality: Bilateral;  L4-5    TRANSFORAMINAL EPIDURAL INJECTION OF STEROID Bilateral 7/29/2020    Procedure: Injection,steroid,epidural,transforaminal approach;  Surgeon: Fidencio Vazquez MD;  Location: [facility] OR;  Service: Pain Management;  Laterality: Bilateral;  L4-5    TRANSFORAMINAL EPIDURAL INJECTION OF STEROID Bilateral 1/15/2021    Procedure: Injection,steroid,epidural,transforaminal approach BILATERAL L4-5;  Surgeon: Fidencio Vazquez MD;  Location: [facility] OR;  Service: Pain Management;  Laterality: Bilateral;    TRANSFORAMINAL EPIDURAL INJECTION OF STEROID Bilateral 11/9/2021    Procedure: Injection,steroid,epidural,transforaminal approach;  Surgeon: Fidencio Vazquez MD;  Location: [facility] OR;  Service: Pain Management;  Laterality: Bilateral;  L4-5    TRANSFORAMINAL EPIDURAL INJECTION OF STEROID Bilateral 7/12/2022    Procedure: INJECTION, STEROID, EPIDURAL, TRANSFORAMINAL APPROACH;  Surgeon: Fidencio Vazquez MD;  Location: [facility] OR;  Service: Pain Management;  Laterality: Bilateral;  L4-5, L5-S1    TRANSFORAMINAL EPIDURAL INJECTION OF STEROID Bilateral 2/28/2023    Procedure: Injection,steroid,epidural,transforaminal approach;  Surgeon: Fidencio Vazquez MD;  Location: [facility] OR;  Service: Pain Management;  Laterality: Bilateral;  L4-5, L5-s1 Gerald TFESI    TRANSFORAMINAL EPIDURAL INJECTION OF STEROID Bilateral 9/1/2023    Procedure: Injection,steroid,epidural,transforaminal approach;  Surgeon: Fidencio Vaqzuez MD;  Location: [facility] OR;  Service: Anesthesiology;  Laterality: Bilateral;  L4-5    TRANSFORAMINAL EPIDURAL INJECTION OF STEROID Bilateral 5/10/2024    Procedure: Injection,steroid,epidural,transforaminal approach;  Surgeon: Fidencio Vazquez MD;  Location: [facility] OR;  Service:  "Anesthesiology;  Laterality: Bilateral;  L4-5 TFESI     No family history on file.  Social History     Socioeconomic History    Marital status:    Tobacco Use    Smoking status: Never    Smokeless tobacco: Never   Substance and Sexual Activity    Alcohol use: Yes     Alcohol/week: 1.0 standard drink of alcohol     Types: 1 Cans of beer per week    Drug use: No    Sexual activity: Yes       Medications/Allergies: See med card    Vitals:    07/25/24 1303   BP: (!) 135/91   Pulse: 78   Weight: 81.2 kg (179 lb 0.2 oz)   Height: 5' 9" (1.753 m)   PainSc:   6   PainLoc: Back       Physical exam:    GENERAL: A and O x3, the patient appears well groomed and is in no acute distress.  Skin: No rashes or obvious lesions  HEENT: normocephalic, atraumatic  CARDIOVASCULAR:  RRR  LUNGS: non labored breathing  ABDOMEN: soft, nontender   UPPER EXTREMITIES: Normal alignment, normal range of motion, no atrophy, no skin changes,  hair growth and nail growth normal and equal bilaterally. No swelling, no tenderness.    LOWER EXTREMITIES:  Normal alignment, normal range of motion, no atrophy, no skin changes,  hair growth and nail growth normal and equal bilaterally. No swelling, no tenderness.  CERVICAL SPINE:  Cervical spine: ROM is limited in flexion, extension and lateral rotation with moderate increased pain.  Spurling's maneuver causes no neck pain to either side.  Myofascial exam: No Tenderness to palpation across cervical paraspinous region bilaterally.    LUMBAR SPINE  Lumbar spine: ROM is full with flexion extension and oblique extension with moderate increased pain.    Darrel's test causes no increased pain on either side.    Supine straight leg raise is negative   Internal and external rotation of the hip causes no increased pain on either side.  Myofascial exam: No tenderness to palpation across lumbar paraspinous muscles.      MENTAL STATUS: normal orientation, speech, language, and fund of knowledge for social " situation.  Emotional state appropriate.    CRANIAL NERVES:  II:  PERRL bilaterally,   III,IV,VI: EOMI.    V:  Facial sensation equal bilaterally  VII:  Facial motor function normal.  VIII:  Hearing equal to finger rub bilaterally  IX/X: Gag normal, palate symmetric  XI:  Shoulder shrug equal, head turn equal  XII:  Tongue midline without fasciculations      MOTOR: Tone and bulk: normal bilateral upper and lower Strength: normal   Delt Bi Tri WE WF     R 5 5 5 5 5 5   L 5 5 5 5 5 5     IP ADD ABD Quad TA Gas HAM  R 5 5 5 5 5 5 5  L 5 5 5 5 5 5 5    SENSATION: Light touch and pinprick intact bilaterally  REFLEXES: normal, symmetric, nonbrisk.  Toes down, no clonus. No hoffmans.  GAIT:  Uses cane for assistance     Imaging:  MRI lumbar spine 01/2016 (outside records)  Lumbar spondylosis with small posterior disc protrusion L4-5 and L5-S1 with moderate bilateral neuroforaminal narrowing at L4-5.  Has postoperative changes of a laminotomy at L4-5 noted.    MRI cervical spine 05/2015  Postoperative changes of total disc arthroplasty at C4-5 anterior fusion with interbody spaces at C5-6 and C6-7.    Assessment:  Marshlal Barboza is a 54 y.o. male with neck and lower back pain  1. Lumbar radiculitis    2. DDD (degenerative disc disease), lumbar    3. Postlaminectomy syndrome of lumbar region        Plan:  1. I have stressed the importance of physical activity and exercise to improve overall health  2. Monitor progress from TF lumbar epidural steroid injection bilaterally at the L4-5.   3. Consider repeat cervical DEMETRIA if neck pain worsens  4. He continue Tramadol 50 mg q day as needed.  He takes this very sparingly. Prescription sent to pharmacy.  Previous UDS consistent.   5.  Follow-up 3 months   All medication management was performed by Dr. Fidencio Vazquez

## 2024-10-22 ENCOUNTER — OFFICE VISIT (OUTPATIENT)
Dept: PAIN MEDICINE | Facility: CLINIC | Age: 54
End: 2024-10-22
Payer: OTHER GOVERNMENT

## 2024-10-22 VITALS
SYSTOLIC BLOOD PRESSURE: 121 MMHG | HEART RATE: 93 BPM | BODY MASS INDEX: 26.51 KG/M2 | HEIGHT: 69 IN | WEIGHT: 179 LBS | DIASTOLIC BLOOD PRESSURE: 79 MMHG

## 2024-10-22 DIAGNOSIS — M54.16 LUMBAR RADICULITIS: Primary | ICD-10-CM

## 2024-10-22 DIAGNOSIS — M96.1 POSTLAMINECTOMY SYNDROME OF LUMBAR REGION: ICD-10-CM

## 2024-10-22 DIAGNOSIS — M51.362 DEGENERATION OF INTERVERTEBRAL DISC OF LUMBAR REGION WITH DISCOGENIC BACK PAIN AND LOWER EXTREMITY PAIN: ICD-10-CM

## 2024-10-22 DIAGNOSIS — M50.30 DDD (DEGENERATIVE DISC DISEASE), CERVICAL: ICD-10-CM

## 2024-10-22 PROCEDURE — 99214 OFFICE O/P EST MOD 30 MIN: CPT | Mod: S$PBB,,, | Performed by: PHYSICIAN ASSISTANT

## 2024-10-22 PROCEDURE — 99999 PR PBB SHADOW E&M-EST. PATIENT-LVL III: CPT | Mod: PBBFAC,,, | Performed by: PHYSICIAN ASSISTANT

## 2024-10-22 PROCEDURE — 99213 OFFICE O/P EST LOW 20 MIN: CPT | Mod: PBBFAC,PN | Performed by: PHYSICIAN ASSISTANT

## 2024-10-22 RX ORDER — TRAMADOL HYDROCHLORIDE 50 MG/1
50 TABLET ORAL EVERY 8 HOURS PRN
Qty: 30 TABLET | Refills: 0 | Status: SHIPPED | OUTPATIENT
Start: 2024-10-22 | End: 2024-11-20

## 2024-10-22 NOTE — PROGRESS NOTES
Referring Physician: No ref. provider found    PCP: Sara Quan, NP      CC:  Neck and lower back pain    Interval History:  Marshall Barboza is a 50 y.o. male with chronic neck and low back pain who presents today for f/u and medication refill. He is s/p b/l  L4-5 TF DEMETRIA that provided 60% relief.  Previous provided 90% relief.  Low back pain  radiates down bilateral lateral thighs.  Terminates at knee. Tolerable after procedure. L4-5 TF DEMETRIA in September  provided 60% relief x 4 months. Denies any weakness, b/b changes, or foot drop. Does not have any symptoms below the knee. He take Tramadol sparingly. Neck pain has improved in past s/p cervical DEMETRIA.  He has a history of C4-C7 ACDF.  Denies dropping objects or hand writing changes. Pain today is rated 5/10.     Prior HPI:   Marshall Barboza is a 54 y.o. male referred to us for neck and lower back pain.  Patient is a former active  personnel.  He has significant history of cervical and lumbar spine surgery.  He underwent L4-5 laminotomy and diskectomy in February 2015.  Radicular pain improved but he continues to have lower back pain.  He also was found to have cervical DDD.  He underwent a C4-C7 ACDF.  Lower back and right leg pain is more bothersome currently.  He has constant aching, throbbing pain in his lower back.  Pain radiates to down his right buttock into his right calf.  Pain occasionally travels down the left calf.  He has had lumbar MB RFA procedures in the past at the  with short-lived relief.  He has undergone lumbar ESIwith moderate benefit for 3 months.  He currently takes tramadol very sparingly with mild-to-moderate benefit.  He denies any worsening weakness.  No bowel bladder changes.    Interventional History:  5/10/23  b/l TF DEMETRIA at L4-5 >50% relief  9/1/23  b/l TF DEMETRIA at L4-5 60% relief   2/28/23  b/l TF DEMETRIA at L4-5 >40% relief   7/12/22 b/l TF DEMETRIA at L4-5 60% relief.   12/28/21 Cervical DEMETRIA >40%  relief  11/9/21 b/l TF DEMETRIA at L4-5 >40% relief   B/L TF DEMETRIA L4-5 on 1/15/21 >90% relief x 6 months  B/L TF DEMETRIA L4-5 on 11/9/21 >40% relief     ROS:  CONSTITUTIONAL: No fevers, chills, night sweats, wt. loss, appetite changes  SKIN: no rashes or itching  ENT: No headaches, head trauma, vision changes, or eye pain  LYMPH NODES: None noticed   CV: No chest pain, palpitations.   RESP: No shortness of breath, dyspnea on exertion, cough, wheezing, or hemoptysis  GI: No nausea, emesis, diarrhea, constipation, melena, hematochezia, pain.    : No dysuria, hematuria, urgency, or frequency   HEME: No easy bruising, bleeding problems  PSYCHIATRIC: No depression, anxiety, psychosis, hallucinations.  NEURO: No seizures, memory loss, dizziness or difficulty sleeping  MSK:  Positive per HPI    Past Medical History:   Diagnosis Date    Allergy     Arthritis     Depression     Hypertension     Lumbar radiculitis 8/2/2018    LIZET on CPAP      Past Surgical History:   Procedure Laterality Date    BREAST SURGERY      EPIDURAL STEROID INJECTION INTO CERVICAL SPINE N/A 12/28/2021    Procedure: Injection-steroid-epidural-cervical;  Surgeon: Fidencio Vazquez MD;  Location: [facility] OR;  Service: Pain Management;  Laterality: N/A;  C7-T1     FRACTURE SURGERY      HERNIA REPAIR      SPINE SURGERY      TRANSFORAMINAL EPIDURAL INJECTION OF STEROID Bilateral 3/12/2019    Procedure: Injection,steroid,epidural,transforaminal approach L4-5;  Surgeon: Fidencio Vazquez MD;  Location: [facility] OR;  Service: Pain Management;  Laterality: Bilateral;    TRANSFORAMINAL EPIDURAL INJECTION OF STEROID Bilateral 8/9/2019    Procedure: Injection,steroid,epidural,transforaminal approach;  Surgeon: Fidencio Vazquez MD;  Location: [facility] OR;  Service: Pain Management;  Laterality: Bilateral;  L4-5    TRANSFORAMINAL EPIDURAL INJECTION OF STEROID Bilateral 11/15/2019    Procedure: Injection,steroid,epidural,transforaminal approach;  Surgeon: Fidencio Vazquez MD;  Location: [facility] OR;  Service:  Pain Management;  Laterality: Bilateral;  L4-L5    TRANSFORAMINAL EPIDURAL INJECTION OF STEROID Bilateral 2/18/2020    Procedure: Injection,steroid,epidural,transforaminal approach;  Surgeon: Fidencio Vazquez MD;  Location: [facility] OR;  Service: Pain Management;  Laterality: Bilateral;  L4-5    TRANSFORAMINAL EPIDURAL INJECTION OF STEROID Bilateral 7/29/2020    Procedure: Injection,steroid,epidural,transforaminal approach;  Surgeon: Fidencio Vazquez MD;  Location: [facility] OR;  Service: Pain Management;  Laterality: Bilateral;  L4-5    TRANSFORAMINAL EPIDURAL INJECTION OF STEROID Bilateral 1/15/2021    Procedure: Injection,steroid,epidural,transforaminal approach BILATERAL L4-5;  Surgeon: Fidencio Vazquez MD;  Location: [facility] OR;  Service: Pain Management;  Laterality: Bilateral;    TRANSFORAMINAL EPIDURAL INJECTION OF STEROID Bilateral 11/9/2021    Procedure: Injection,steroid,epidural,transforaminal approach;  Surgeon: Fidencio Vazquez MD;  Location: [facility] OR;  Service: Pain Management;  Laterality: Bilateral;  L4-5    TRANSFORAMINAL EPIDURAL INJECTION OF STEROID Bilateral 7/12/2022    Procedure: INJECTION, STEROID, EPIDURAL, TRANSFORAMINAL APPROACH;  Surgeon: Fidencio Vazquez MD;  Location: [facility] OR;  Service: Pain Management;  Laterality: Bilateral;  L4-5, L5-S1    TRANSFORAMINAL EPIDURAL INJECTION OF STEROID Bilateral 2/28/2023    Procedure: Injection,steroid,epidural,transforaminal approach;  Surgeon: Fidencio Vazquez MD;  Location: [facility] OR;  Service: Pain Management;  Laterality: Bilateral;  L4-5, L5-s1 Gerald TFESI    TRANSFORAMINAL EPIDURAL INJECTION OF STEROID Bilateral 9/1/2023    Procedure: Injection,steroid,epidural,transforaminal approach;  Surgeon: Fidencio Vazquez MD;  Location: [facility] OR;  Service: Anesthesiology;  Laterality: Bilateral;  L4-5    TRANSFORAMINAL EPIDURAL INJECTION OF STEROID Bilateral 5/10/2024    Procedure: Injection,steroid,epidural,transforaminal approach;  Surgeon: Fidencio Vazquez MD;  Location: [facility] OR;  Service:  "Anesthesiology;  Laterality: Bilateral;  L4-5 TFESI     No family history on file.  Social History     Socioeconomic History    Marital status:    Tobacco Use    Smoking status: Never    Smokeless tobacco: Never   Substance and Sexual Activity    Alcohol use: Yes     Alcohol/week: 1.0 standard drink of alcohol     Types: 1 Cans of beer per week    Drug use: No    Sexual activity: Yes       Medications/Allergies: See med card    Vitals:    10/22/24 1214   BP: 121/79   Pulse: 93   Weight: 81.2 kg (179 lb 0.2 oz)   Height: 5' 9" (1.753 m)   PainSc:   5   PainLoc: Neck       Physical exam:    GENERAL: A and O x3, the patient appears well groomed and is in no acute distress.  Skin: No rashes or obvious lesions  HEENT: normocephalic, atraumatic  CARDIOVASCULAR:  RRR  LUNGS: non labored breathing  ABDOMEN: soft, nontender   UPPER EXTREMITIES: Normal alignment, normal range of motion, no atrophy, no skin changes,  hair growth and nail growth normal and equal bilaterally. No swelling, no tenderness.    LOWER EXTREMITIES:  Normal alignment, normal range of motion, no atrophy, no skin changes,  hair growth and nail growth normal and equal bilaterally. No swelling, no tenderness.  CERVICAL SPINE:  Cervical spine: ROM is limited in flexion, extension and lateral rotation with moderate increased pain.  Spurling's maneuver causes no neck pain to either side.  Myofascial exam: No Tenderness to palpation across cervical paraspinous region bilaterally.    LUMBAR SPINE  Lumbar spine: ROM is full with flexion extension and oblique extension with moderate increased pain.    Darrel's test causes no increased pain on either side.    Supine straight leg raise is negative   Internal and external rotation of the hip causes no increased pain on either side.  Myofascial exam: No tenderness to palpation across lumbar paraspinous muscles.      MENTAL STATUS: normal orientation, speech, language, and fund of knowledge for social " situation.  Emotional state appropriate.    CRANIAL NERVES:  II:  PERRL bilaterally,   III,IV,VI: EOMI.    V:  Facial sensation equal bilaterally  VII:  Facial motor function normal.  VIII:  Hearing equal to finger rub bilaterally  IX/X: Gag normal, palate symmetric  XI:  Shoulder shrug equal, head turn equal  XII:  Tongue midline without fasciculations      MOTOR: Tone and bulk: normal bilateral upper and lower Strength: normal   Delt Bi Tri WE WF     R 5 5 5 5 5 5   L 5 5 5 5 5 5     IP ADD ABD Quad TA Gas HAM  R 5 5 5 5 5 5 5  L 5 5 5 5 5 5 5    SENSATION: Light touch and pinprick intact bilaterally  REFLEXES: normal, symmetric, nonbrisk.  Toes down, no clonus. No hoffmans.  GAIT:  Uses cane for assistance     Imaging:  MRI lumbar spine 01/2016 (outside records)  Lumbar spondylosis with small posterior disc protrusion L4-5 and L5-S1 with moderate bilateral neuroforaminal narrowing at L4-5.  Has postoperative changes of a laminotomy at L4-5 noted.    MRI cervical spine 05/2015  Postoperative changes of total disc arthroplasty at C4-5 anterior fusion with interbody spaces at C5-6 and C6-7.    Assessment:  Marshall Barboza is a 54 y.o. male with neck and lower back pain  1. Lumbar radiculitis    2. Degeneration of intervertebral disc of lumbar region with discogenic back pain and lower extremity pain    3. Postlaminectomy syndrome of lumbar region    4. DDD (degenerative disc disease), cervical      Plan:  1. I have stressed the importance of physical activity and exercise to improve overall health  2. Monitor progress from TF lumbar epidural steroid injection bilaterally at the L4-5.   3. Consider repeat cervical DEMETRIA if neck pain worsens  4. He continue Tramadol 50 mg q day as needed.  He takes this very sparingly. Prescription sent to pharmacy.  Previous UDS consistent.   5.  Follow-up 3 months   All medication management was performed by Dr. Fidencio Vazquez

## 2024-11-22 DIAGNOSIS — M48.02 SPINAL STENOSIS, CERVICAL REGION: ICD-10-CM

## 2024-11-22 DIAGNOSIS — M54.2 CERVICALGIA: Primary | ICD-10-CM

## 2024-12-04 ENCOUNTER — TELEPHONE (OUTPATIENT)
Dept: PAIN MEDICINE | Facility: CLINIC | Age: 54
End: 2024-12-04
Payer: OTHER GOVERNMENT

## 2024-12-04 ENCOUNTER — HOSPITAL ENCOUNTER (OUTPATIENT)
Dept: RADIOLOGY | Facility: HOSPITAL | Age: 54
Discharge: HOME | End: 2024-12-04
Attending: PHYSICIAN ASSISTANT
Payer: OTHER GOVERNMENT

## 2024-12-04 DIAGNOSIS — M54.2 CERVICALGIA: ICD-10-CM

## 2024-12-04 DIAGNOSIS — M48.02 SPINAL STENOSIS, CERVICAL REGION: ICD-10-CM

## 2024-12-04 PROCEDURE — 72141 MRI NECK SPINE W/O DYE: CPT | Mod: TC

## 2024-12-04 PROCEDURE — 72141 MRI NECK SPINE W/O DYE: CPT | Mod: 26,,, | Performed by: RADIOLOGY

## 2024-12-04 NOTE — TELEPHONE ENCOUNTER
I will have Kenya sign the letter ,and have the Ladies in the front give it to him     ----- Message from Nurse Maribel sent at 12/4/2024  7:38 AM CST -----  I have printed a letter for this pt can you please have kenya sign it and leave it at  for the pt. Thank you.  ----- Message -----  From: Kenya Putnam PA-C  Sent: 12/3/2024   1:53 PM CST  To: Maribel Ecohls LPN    Ok to provide letter stating patient has taken Tramadol sparingly for chronic pain without any known cognitive impairment  ----- Message -----  From: Maribel Echols LPN  Sent: 12/3/2024  10:51 AM CST  To: Kenya Putnam PA-C    Pt takes tramadol with us sparingly . Is it ok to provide this letter he is asking for?  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

## 2024-12-05 ENCOUNTER — TELEPHONE (OUTPATIENT)
Dept: PAIN MEDICINE | Facility: CLINIC | Age: 54
End: 2024-12-05
Payer: OTHER GOVERNMENT

## 2024-12-05 DIAGNOSIS — M54.12 CERVICAL RADICULITIS: ICD-10-CM

## 2024-12-05 DIAGNOSIS — M50.30 DDD (DEGENERATIVE DISC DISEASE), CERVICAL: Primary | ICD-10-CM

## 2024-12-05 NOTE — TELEPHONE ENCOUNTER
Epidural Injection (specify level) Comment - C7-T1    Physician tiara osorio   Pre-op Diagnosis Cervical radiculopathy   Facility Name: [facility]   Follow-up: 4 weeks

## 2024-12-05 NOTE — TELEPHONE ENCOUNTER
MRI reviewed. The levels above fusion are worsening. I have ordered a DEMETRIA but if symptoms worsen recommend f/u with neurosurgery

## 2024-12-06 NOTE — TELEPHONE ENCOUNTER
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

## 2025-06-19 NOTE — PLAN OF CARE
Discharge instructions given to pt, verbalized understanding.  Tolerating fluids.  No c/o pain.  Ambulating out to [employer]  per RN in no distress.   [FreeTextEntry1] : 45 year old male presents with complaints of intermittent episodes of acid reflux. He has a family hx of esophageal cancer. Patient states he is not taking medication at this time for management of symptoms. He is making dietary changes. He also experiences intermittent episodes. of constipation and rectal discomfort. Denies rectal bleeding, blood in the stool, melena, or hematemesis.

## (undated) DEVICE — SYR DISP LL 5CC

## (undated) DEVICE — GLOVE SURG ULTRA TOUCH 6

## (undated) DEVICE — TUBING MINIBORE EXTENSION

## (undated) DEVICE — CHLORAPREP 10.5 ML APPLICATOR

## (undated) DEVICE — SYS LABEL CORRECT MED

## (undated) DEVICE — GLOVE SENSICARE PI GRN 7.5

## (undated) DEVICE — GLOVE SURGEONS ULTRA TOUCH 6.5

## (undated) DEVICE — NDL HYPODERMIC BLUNT 18G 1.5IN

## (undated) DEVICE — APPLICATOR CHLORAPREP CLR 10.5

## (undated) DEVICE — NDL SAFETY 25G X 1.5 ECLIPSE

## (undated) DEVICE — GLOVE SURG ULTRA TOUCH 7.5

## (undated) DEVICE — NDL SPINAL SPINOCAN 22GX3.5

## (undated) DEVICE — GLOVE PROTEXIS PI CLASSIC 6.0

## (undated) DEVICE — NDL SPINAL 22GX5

## (undated) DEVICE — BAG PATIENT BELONGING

## (undated) DEVICE — SYR 10CC LUER LOCK

## (undated) DEVICE — SYR GLASS 5CC LUER LOK

## (undated) DEVICE — NDL BLUNT W/O FILTER 18GX1.5IN

## (undated) DEVICE — GLOVE PROTEXIS PI CLASSIC 7.5

## (undated) DEVICE — GLOVE PROTEXIS PI CLASSIC 6.5

## (undated) DEVICE — NDL TUOHY EPIDURAL 20G X 3.5

## (undated) DEVICE — NDL HYPO SAFETY 25GX1.5IN